# Patient Record
Sex: FEMALE | Race: WHITE
[De-identification: names, ages, dates, MRNs, and addresses within clinical notes are randomized per-mention and may not be internally consistent; named-entity substitution may affect disease eponyms.]

---

## 2017-01-04 ENCOUNTER — HOSPITAL ENCOUNTER (INPATIENT)
Dept: HOSPITAL 74 - JER | Age: 77
LOS: 1 days | Discharge: HOME | DRG: 309 | End: 2017-01-05
Attending: FAMILY MEDICINE | Admitting: FAMILY MEDICINE
Payer: COMMERCIAL

## 2017-01-04 VITALS — BODY MASS INDEX: 26.5 KG/M2

## 2017-01-04 DIAGNOSIS — I10: ICD-10-CM

## 2017-01-04 DIAGNOSIS — J98.11: ICD-10-CM

## 2017-01-04 DIAGNOSIS — Z79.01: ICD-10-CM

## 2017-01-04 DIAGNOSIS — I51.9: ICD-10-CM

## 2017-01-04 DIAGNOSIS — K29.60: ICD-10-CM

## 2017-01-04 DIAGNOSIS — C44.89: ICD-10-CM

## 2017-01-04 DIAGNOSIS — I48.0: Primary | ICD-10-CM

## 2017-01-04 DIAGNOSIS — I35.0: ICD-10-CM

## 2017-01-04 DIAGNOSIS — K86.1: ICD-10-CM

## 2017-01-04 DIAGNOSIS — I25.118: ICD-10-CM

## 2017-01-04 DIAGNOSIS — Z95.0: ICD-10-CM

## 2017-01-04 DIAGNOSIS — E78.5: ICD-10-CM

## 2017-01-04 DIAGNOSIS — K76.0: ICD-10-CM

## 2017-01-04 LAB
ALBUMIN SERPL-MCNC: 3.6 G/DL (ref 3.4–5)
ALP SERPL-CCNC: 69 U/L (ref 45–117)
ALT SERPL-CCNC: 16 U/L (ref 12–78)
ANION GAP SERPL CALC-SCNC: 7 MMOL/L (ref 8–16)
APPEARANCE UR: CLEAR
APTT BLD: 46.6 SECONDS (ref 26.9–34.4)
AST SERPL-CCNC: 18 U/L (ref 15–37)
BASOPHILS # BLD: 0.9 % (ref 0–2)
BILIRUB SERPL-MCNC: 0.2 MG/DL (ref 0.2–1)
BILIRUB UR STRIP.AUTO-MCNC: NEGATIVE MG/DL
CALCIUM SERPL-MCNC: 8.7 MG/DL (ref 8.5–10.1)
CO2 SERPL-SCNC: 28 MMOL/L (ref 21–32)
COLOR UR: COLORLESS
CREAT SERPL-MCNC: 0.9 MG/DL (ref 0.55–1.02)
DEPRECATED RDW RBC AUTO: 15.1 % (ref 11.6–15.6)
EOSINOPHIL # BLD: 3.1 % (ref 0–4.5)
GLUCOSE SERPL-MCNC: 107 MG/DL (ref 74–106)
INR BLD: 2.34 (ref 0.82–1.09)
KETONES UR QL STRIP: NEGATIVE
LEUKOCYTE ESTERASE UR QL STRIP.AUTO: NEGATIVE
MCH RBC QN AUTO: 28.2 PG (ref 25.7–33.7)
MCHC RBC AUTO-ENTMCNC: 31.6 G/DL (ref 32–36)
MCV RBC: 89.2 FL (ref 80–96)
NEUTROPHILS # BLD: 63.3 % (ref 42.8–82.8)
NITRITE UR QL STRIP: NEGATIVE
PH UR: 7 [PH] (ref 5–8)
PLATELET # BLD AUTO: 239 K/MM3 (ref 134–434)
PMV BLD: 8.5 FL (ref 7.5–11.1)
PROT SERPL-MCNC: 7.2 G/DL (ref 6.4–8.2)
PROT UR QL STRIP: NEGATIVE
PROT UR QL STRIP: NEGATIVE
PT PNL PPP: 26.2 SEC (ref 9.98–11.88)
RBC # BLD AUTO: 4 /HPF (ref 0–3)
RBC # UR STRIP: (no result) /UL
SP GR UR: 1 (ref 1–1.03)
TROPONIN I SERPL-MCNC: < 0.02 NG/ML (ref 0–0.05)
TSH SERPL-ACNC: 2.68 UIU/ML (ref 0.36–3.74)
UROBILINOGEN UR STRIP-MCNC: NEGATIVE E.U./DL (ref 0.2–1)
WBC # BLD AUTO: 9.7 K/MM3 (ref 4–10)
WBC # UR AUTO: <1 /HPF (ref 3–5)

## 2017-01-04 RX ADMIN — SOTALOL HYDROCHLORIDE SCH MG: 80 TABLET ORAL at 22:14

## 2017-01-04 NOTE — HP
CHIEF COMPLAINT: " i had palpitations but i feel fine now" 





PCP: Dr. Kang





HISTORY OF PRESENT ILLNESS:





This is a 75 yo F with PMH of HTN, CAD, a-fib on Coum, and pacemaker, who 

presents due to chest discomfort and palpitations. Her palpitations have been 

on and off for the past week, associated with mild lightheadedness. She states 

that she has not had any palpitations for 2 month prior to this onset.   This 

morning she experienced an episode of diffuse chest pressure that radiated to 

her back. She has had similar chest pressure in the past.  It lasted an hour 

and resolved spontaneously. She is scheduled for a pacemaker interrogation with 

[employer] on Monday.  She denies chest pain, sob, cough, n/v, 

diaphoresis, h/a, abd pain or recent illnesses. She currently feels well.





ER course was notable for:


(1)EKG, CXR 


(2)Labs 


(3)asa





Recent Travel: denies 





PAST MEDICAL HISTORY: as above





PAST SURGICAL HISTORY:





Social History: lives at home 


Smoking: denies


Alcohol: denies


Drugs: denies





Family History:


Allergies





Penicillins Adverse Reaction (Verified 01/04/17 16:55)


 Itching








HOME MEDICATIONS:


 











 Medication  Instructions  Recorded


 


Warfarin Sodium [Coumadin] 5 mg PO ASDIR 10/16/14


 


Sotalol HCl [Betapace -] 80 mg PO BID #28 tablet 10/19/14


 


Esomeprazole Mag Trihydrate 40 mg PO DAILY 04/02/15





[Nexium]  


 


Diltiazem Cd [Cardizem Cd -] 300 mg PO DAILY #30 cap.cd.24h 07/24/15


 


Valsartan [Diovan] 160 mg PO DAILY #14 tablet 07/24/15


 


Warfarin Na [Coumadin] 2.5 mg PO ASDIR 02/02/16








 Selected Entries











  01/04/17 01/04/17





  14:37 20:02


 


Temperature 97.7 F 


 


Pulse Rate 60 


 


Pulse Rate [  58 L





Left]  


 


Respiratory  16





Rate  


 


Blood Pressure 138/50 


 


Blood Pressure  157/87





[Arm]  


 


O2 Sat by Pulse  99





Oximetry (%)  


 


Oxygen Delivery  Room Air





Method  














REVIEW OF SYSTEMS


CONSTITUTIONAL: 


Absent:  fever, chills, diaphoresis, generalized weakness


HEENT: 


Absent:  rhinorrhea, difficulty swallowing


CARDIOVASCULAR: 


Absent: chest pain, syncope, peripheral edema


RESPIRATORY: 


Absent: cough, shortness of breath, orthopnea


GASTROINTESTINAL:


Absent: abdominal pain, abdominal distension, nausea, vomiting, diarrhea, 

constipation


GENITOURINARY: 


Absent: dysuria, flank pain


MUSCULOSKELETAL: 


Absent: neck pain


SKIN: 


Absent: rash


HEMATOLOGIC/IMMUNOLOGIC: 


Absent: frequent infections


ENDOCRINE:


Absent: unexplained weight gain, unexplained weight loss


NEUROLOGIC: 


Absent: headache, focal weakness or paresthesias


PSYCHIATRIC: 


Absent: anxiety





 Laboratory Tests











  01/04/17 01/04/17 01/04/17





  16:40 16:40 16:40


 


WBC  9.7  


 


Hgb  12.3  


 


Hct  38.9  


 


Plt Count  239  


 


INR   2.34 H 


 


PTT (Actin FS)   46.6 H 


 


Sodium    141


 


Potassium    4.5


 


Chloride    106


 


Carbon Dioxide    28


 


Anion Gap    7 L


 


BUN    14


 


Creatinine    0.9


 


Creat Clearance w eGFR    > 60


 


Random Glucose    107 H


 


Calcium    8.7


 


Total Bilirubin    0.2


 


AST    18  D


 


ALT    16  D


 


Alkaline Phosphatase    69


 


Creatine Kinase    66


 


Troponin I    < 0.02


 


B-Natriuretic Peptide    341.88


 


Total Protein    7.2


 


Albumin    3.6


 


TSH    2.68


 


Urine Color   


 


Urine Appearance   


 


Urine pH   


 


Ur Specific Gravity   


 


Urine Protein   


 


Urine Glucose (UA)   


 


Urine Ketones   


 


Urine Blood   


 


Urine Nitrite   


 


Urine Bilirubin   


 


Urine Urobilinogen   


 


Ur Leukocyte Esterase   


 


Urine RBC   


 


Urine WBC   


 


Ur Epithelial Cells   














  01/04/17





  17:50


 


WBC 


 


Hgb 


 


Hct 


 


Plt Count 


 


INR 


 


PTT (Actin FS) 


 


Sodium 


 


Potassium 


 


Chloride 


 


Carbon Dioxide 


 


Anion Gap 


 


BUN 


 


Creatinine 


 


Creat Clearance w eGFR 


 


Random Glucose 


 


Calcium 


 


Total Bilirubin 


 


AST 


 


ALT 


 


Alkaline Phosphatase 


 


Creatine Kinase 


 


Troponin I 


 


B-Natriuretic Peptide 


 


Total Protein 


 


Albumin 


 


TSH 


 


Urine Color  Colorless


 


Urine Appearance  Clear


 


Urine pH  7.0


 


Ur Specific Gravity  1.005


 


Urine Protein  Negative


 


Urine Glucose (UA)  Negative


 


Urine Ketones  Negative


 


Urine Blood  2+ H


 


Urine Nitrite  Negative


 


Urine Bilirubin  Negative


 


Urine Urobilinogen  Negative


 


Ur Leukocyte Esterase  Negative


 


Urine RBC  4


 


Urine WBC  <1


 


Ur Epithelial Cells  Rare














PHYSICAL EXAMINATION





GENERAL: Awake, alert, and fully oriented, in no acute distress.


HEAD: Normal with no signs of trauma.


EYES: Pupils equal, round and reactive to light, extraocular movements intact, 

sclera anicteric, conjunctiva clear. 


EARS, NOSE, THROAT: Moist mucous membranes.


NECK: supple without JVD


LUNGS: Breath sounds equal, clear to auscultation bilaterally. 


HEART: Regular rate and rhythm, normal S1 and S2


ABDOMEN: Soft, nontender, not distended, normoactive bowel sounds


MUSCULOSKELETAL: No CVA tenderness.


UPPER EXTREMITIES: 2+ pulses, warm, well-perfused. No peripheral edema.


LOWER EXTREMITIES: 2+ pulses, warm, well-perfused. No calf tenderness. No 

peripheral edema. 


NEUROLOGICAL:  Cranial nerves II-XII grossly intact. Normal speech. 


PSYCHIATRIC: Cooperative. Good eye contact. Appropriate mood and affect.


SKIN: Warm, dry





ASSESSMENT/PLAN:





This is a 75 yo F with PMH of HTN, CAD, a-fib on Coum, and pacemaker, who 

presents due to chest discomfort and palpitations. palpitations x 1 w, chest 

pressure x 1 hr. 





CXRL mild bibasilar atelectasis





Angina pectoralis 


-likely due to demand ischemia in setting of rapid a-fib


-now resolved 


-EKG regular sinus, no ACS 


-trop negative x1, trend trop


-tele monitoring


-continue asa


-f/u lipid profile, a1c, TFT 





Atrial fibrillation


-likely rapid a fib over the past week, now regular sinus


-continue sotalol, cardizem and coum


-pacemaker interrogation tomorrow AM





HTN


-continue diovan





HLD


-continue statin





FEN


no IVF


stable lytes


DVT GI PPX: coum, ppi


Na restricted diet





Dispo: Obs in tele














Problem List





- Problem


(1) Chest pain


Code(s): R07.9 - CHEST PAIN, UNSPECIFIED   Qualifiers: 


     Chest pain type: unspecified     Qualified Code(s): R07.9 - Chest pain, 

unspecified  





(2) Heart palpitations


Code(s): R00.2 - PALPITATIONS





(3) DVT prophylaxis


Code(s): LGG2176 - 





(4) Hypertension


Code(s): I10 - ESSENTIAL (PRIMARY) HYPERTENSION   








Visit type





- Emergency Visit


Emergency Visit: Yes


ED Registration Date: 01/04/17


Care time: The patient presented to the Emergency Department on the above date 

and was hospitalized for further evaluation of their emergent condition.





- New Patient


This patient is new to me today: Yes


Date on this admission: 01/05/17





- Critical Care


Critical Care patient: No

## 2017-01-04 NOTE — PDOC
History of Present Illness





- History of Present Illness


Initial Comments: 





01/04/17 17:54


The patient is a 76-year-old female with a significant past medical history of 

hypertension, coronary disease, atrial fibrillation on Coumadin, and pacemaker, 

who presents to the emergency department for palpitations and chest pressure 

today. She states her palpitations have been intermittent for about a week. The 

patient states that today she experienced an episode of chest pressure that 

radiated to her back but now resolved. She states she felt lightheaded with her 

palpitations. She reports she is due for a check up of her pacemaker with 

[employer] this upcoming Monday. 





She denies any recent illnesses, fevers, chills, cough, vomiting. She denies 

chest pain right now at this moment but feels generally fatigued.





Allergies: Penicillins


PCP - Dr. Kang





<Rahel Garibay - Last Filed: 01/04/17 18:46>





- General


History Source: Patient, Family, Old Records


Exam Limitations: No Limitations





<Sarabjit Nielsen - Last Filed: 01/04/17 18:50>





- General


Chief Complaint: Palpitations


Stated Complaint: HEART PALPITATIONS


Time Seen by Provider: 01/04/17 16:18





Past History





<Rahel Garibay - Last Filed: 01/04/17 18:46>





- Past Medical History


Anemia: No


Asthma: No


Cancer: Yes (SKIN CA- EARLOBE)


Cardiac Disorders: Yes (CORONARY ARTERY DISEASE)


CVA: No


COPD: No


CHF: No


Dementia: No


Diabetes: No


GI Disorders: Yes (GASTRITIS, diverticlitis)


 Disorders: No


HTN: Yes


Hypercholesterolemia: No


Liver Disease:  (FATTY LIVER)


Suicide Attempt (Hx): No


Seizures: No


Thyroid Disease: No





- Surgical History


Abdominal Surgery: No


Appendectomy: No


Cardiac Surgery: Yes (PACEMAKER IMPLANT-Head Held High-7/2011)


Cholecystectomy: No


Lung Surgery: No


Neurologic Surgery: No


Orthopedic Surgery: No





- Family Disease History


Family Disease History: Heart Disease: Father





- Immunization History


Immunization Up to Date: Yes





- Psycho/Social/Smoking Cessation Hx


Anxiety: No


Suicidal Ideation: No


Smoking Status: Yes


Smoking History: Never smoked


Have you smoked in the past 12 months: No


Number of Cigarettes Smoked Daily: 0


If you are a former smoker, when did you quit?: 25 yrs


Hx Alcohol Use: No


Drug/Substance Use Hx: No


Substance Use Type: None


Hx Substance Use Treatment: No





<Elizabeth Nielsenel - Last Filed: 01/04/17 18:50>





- Past Medical History


Allergies/Adverse Reactions: 


 Allergies











Allergy/AdvReac Type Severity Reaction Status Date / Time


 


Penicillins AdvReac  Itching Verified 01/04/17 16:55











Home Medications: 


Ambulatory Orders





Warfarin Sodium [Coumadin] 5 mg PO ASDIR 10/16/14 


Sotalol HCl [Betapace -] 80 mg PO BID #28 tablet 10/19/14 


Esomeprazole Mag Trihydrate [Nexium] 40 mg PO DAILY 04/02/15 


Diltiazem Cd [Cardizem Cd -] 300 mg PO DAILY #30 cap.cd.24h 07/24/15 


Valsartan [Diovan] 160 mg PO DAILY #14 tablet 07/24/15 


Warfarin Na [Coumadin] 2.5 mg PO ASDIR 02/02/16 











**Review of Systems





- Review of Systems


Able to Perform ROS?: Yes


Comments:: 





01/04/17 17:54





GENERAL/CONSTITUTIONAL: No fever or chills. No weakness.


HEAD, EYES, EARS, NOSE AND THROAT: No change in vision. No ear pain or 

discharge. No sore throat.


CARDIOVASCULAR: (+) chest pressure and palpitations. No chest pain or shortness 

of breath.


RESPIRATORY: No cough, wheezing, or hemoptysis.


GASTROINTESTINAL: No nausea, vomiting, diarrhea or constipation.


GENITOURINARY: No dysuria, frequency, or change in urination.


MUSCULOSKELETAL: No joint or muscle swelling or pain. No neck or back pain.


SKIN: No rash


NEUROLOGIC: No headache, vertigo, loss of consciousness, or change in strength/

sensation.


ENDOCRINE: No increased thirst. No abnormal weight change.


HEMATOLOGIC/LYMPHATIC: No anemia, easy bleeding, or history of blood clots.


ALLERGIC/IMMUNOLOGIC: No hives or skin allergy.











<Rahel Garibay - Last Filed: 01/04/17 18:46>





*Physical Exam





- Vital Signs


 Last Vital Signs











Temp Pulse Resp BP Pulse Ox


 


 97.7 F   60   20   138/50   97 


 


 01/04/17 14:37  01/04/17 14:37  01/04/17 14:37  01/04/17 14:37  01/04/17 14:37














- Physical Exam


Comments: 





01/04/17 17:55





GENERAL: Awake, alert, and fully oriented, in no acute distress


HEAD: No signs of trauma


EYES: PERRLA, EOMI, sclera anicteric, conjunctiva clear


ENT: Auricles normal inspection, hearing grossly normal, nares patent, 

oropharynx clear without exudates. Moist mucosa


NECK: Normal ROM, supple, no lymphadenopathy, JVD, or masses


LUNGS: Breath sounds equal, clear to auscultation bilaterally.  No wheezes, and 

no crackles


HEART: (+) Pacemaker on left. Regular rate and rhythm, normal S1 and S2, no 

murmurs, rubs or gallops


ABDOMEN: Soft, nontender, normoactive bowel sounds.  No guarding, no rebound.  

No masses


EXTREMITIES: Normal range of motion, no edema.  No clubbing or cyanosis. No 

cords, erythema, or tenderness


NEUROLOGICAL: Cranial nerves II-XII intact. Normal speech, normal gait. 

Sensation intact in upper and lower extremities. 5/5 motor strength in upper 

and lower extremities. No pronator drift. Finger to nose intact. Rapid 

alternations intact. 


SKIN: Warm, Dry, normal turgor, no rashes or lesions noted.








<Rahel Garibay - Last Filed: 01/04/17 18:46>





- Vital Signs


 Last Vital Signs











Temp Pulse Resp BP Pulse Ox


 


 97.7 F   60   20   138/50   97 


 


 01/04/17 14:37  01/04/17 14:37  01/04/17 14:37  01/04/17 14:37  01/04/17 14:37














<Sarabjit Nielsen - Last Filed: 01/04/17 18:50>





**Heart Score/ECG Review





- History


History: Moderately suspicious





- Electrocardiogram


EKG: Non specific repolarization disturbance





- Age


Age: >/= 65





- Risk Factors


Based on the list above the patient has:: >/=3 risk factors or Hx 

atherosclerotic disease





- Troponin


Troponin: </= normal limit





- Score


Heart Score - Total: 6


  ** #1


ECG reviewed & interpreted by me at: 17:00





01/04/17 17:04


Paced, LBBB (old),  msec, scarbossa negative





<Sarabjit Nielsen - Last Filed: 01/04/17 18:50>





ED Treatment Course





- LABORATORY


CBC & Chemistry Diagram: 


 01/04/17 16:40





 01/04/17 16:40





- ADDITIONAL ORDERS


Additional order review: 


 Laboratory  Results











  01/04/17 01/04/17





  16:40 16:40


 


INR   2.34 H


 


PTT (Actin FS)   46.6 H


 


Sodium  141 


 


Potassium  4.5 


 


Chloride  106 


 


Carbon Dioxide  28 


 


Anion Gap  7 L 


 


BUN  14 


 


Creatinine  0.9 


 


Creat Clearance w eGFR  > 60 


 


Random Glucose  107 H 


 


Calcium  8.7 


 


Total Bilirubin  0.2 


 


AST  18  D 


 


ALT  16  D 


 


Alkaline Phosphatase  69 


 


Creatine Kinase  66 


 


Troponin I  < 0.02 


 


B-Natriuretic Peptide  341.88 


 


Total Protein  7.2 


 


Albumin  3.6 


 


TSH  2.68 








 











  01/04/17





  16:40


 


RBC  4.36


 


MCV  89.2


 


MCHC  31.6 L


 


RDW  15.1


 


MPV  8.5


 


Neutrophils %  63.3


 


Lymphocytes %  22.8


 


Monocytes %  9.9


 


Eosinophils %  3.1


 


Basophils %  0.9














- RADIOLOGY


Radiograph Interpretation: 





01/04/17 18:46


CXR was read by Dr. Pa at 17:15


Impression: interval mild bibasal atelectatic changes without definite 

infiltrates





- Medications


Given in the ED: 


ED Medications














Discontinued Medications














Generic Name Dose Route Start Last Admin





  Trade Name Freq  PRN Reason Stop Dose Admin


 


Aspirin  162 mg 01/04/17 16:44 01/04/17 16:54





  Asa -  PO 01/04/17 16:45  Not Given





  ONCE ONE   














<Rahel Garibay - Last Filed: 01/04/17 18:46>





- LABORATORY


CBC & Chemistry Diagram: 


 01/04/17 16:40





 01/04/17 16:40





- RADIOLOGY


Radiology Studies Ordered: 














 Category Date Time Status


 


 CHEST X-RAY PORTABLE* [RAD] Stat Radiology  01/04/17 16:20 Ordered














<Sarabjit Nielsen - Last Filed: 01/04/17 18:50>





Medical Decision Making





- Medical Decision Making


01/04/17 18:12


[employer] was called for interrogation of the patient's pacemaker ([phone]) at 17:10. 


The representative returned the call at 17:16 and the patient's case was 

discussed. 





01/04/17 18:23


The hospitalist martha was paged at this time for admission of Dr. Kang's 

patient.





<Rahel Garibay - Last Filed: 01/04/17 18:46>





- Medical Decision Making


01/04/17 16:45





A portion of this note was documented by scribe services under my direction. I 

have reviewed the details of the note, within reason, and agree with the 

documentation with the following case summary and management plan written by 

me. 





Patient treated in the ED.





Nursing notes are reviewed and incorporated into the medical decision-making.


Vital signs reviewed.





Peripheral IV access obtained by the nurse, laboratory studies are drawn and 

sent, reviewed and interpreted by myself. 





 Vital Signs











Temp Pulse Resp BP Pulse Ox


 


 97.7 F   60   20   138/50   97 


 


 01/04/17 14:37  01/04/17 14:37  01/04/17 14:37  01/04/17 14:37  01/04/17 14:37








76-year-old female with history of hypertension, coronary disease, atrial 

fibrillation on Coumadin, pacemaker presents to the emergency department for 

palpitations and chest pressure. She is had an intermittent rapid palpitations 

that come and go for the last week. However, she noted today that she had an 

episode of chest pressure that radiated to the back but now resolved. She felt 

lightheaded with her palpitations. She is due for check up of her pacemaker 

with [employer] this upcoming Monday. She denies any recent illnesses, 

fevers, chills, cough, vomiting. Denies chest pain right now at this moment but 

feels generally fatigued.





The differential is broad but reportedly must rule out MI. With the 

palpitations and cardiac disease, must rule out atrial fibrilation with rapid 

ventricular response, ventricular dysrhythmias or other arrhythmias. We'll need 

to rule out other metabolic causes as well as infectious causes. Ultram, the 

patient benefit from an observation in the hospital.





01/04/17 18:48





Chest x-ray reviewed. No infiltrates appreciated.





 CBC, BMP





 01/04/17 16:40 





 01/04/17 16:40 





 CMP











Sodium  141 mmol/L (136-145)   01/04/17  16:40    


 


Potassium  4.5 mmol/L (3.5-5.1)   01/04/17  16:40    


 


Chloride  106 mmol/L ()   01/04/17  16:40    


 


Carbon Dioxide  28 mmol/L (21-32)   01/04/17  16:40    


 


Anion Gap  7  (8-16)  L  01/04/17  16:40    


 


BUN  14 mg/dL (7-18)   01/04/17  16:40    


 


Creatinine  0.9 mg/dL (0.55-1.02)   01/04/17  16:40    


 


Creat Clearance w eGFR  > 60  (>60)   01/04/17  16:40    


 


Random Glucose  107 mg/dL ()  H  01/04/17  16:40    


 


Calcium  8.7 mg/dL (8.5-10.1)   01/04/17  16:40    


 


Total Bilirubin  0.2 mg/dL (0.2-1.0)   01/04/17  16:40    


 


AST  18 U/L (15-37)  D 01/04/17  16:40    


 


ALT  16 U/L (12-78)  D 01/04/17  16:40    


 


Alkaline Phosphatase  69 U/L ()   01/04/17  16:40    


 


Creatine Kinase  66 IU/L ()   01/04/17  16:40    


 


Troponin I  < 0.02 ng/ml (0.00-0.05)   01/04/17  16:40    


 


B-Natriuretic Peptide  341.88 pg/ml (5-450)   01/04/17  16:40    


 


Total Protein  7.2 g/dl (6.4-8.2)   01/04/17  16:40    


 


Albumin  3.6 g/dl (3.4-5.0)   01/04/17  16:40    


 


TSH  2.68 uIU/ml (0.358-3.74)   01/04/17  16:40    








 Urine Test Results











Urine Color  Colorless   01/04/17  17:50    


 


Urine Appearance  Clear   01/04/17  17:50    


 


Urine pH  7.0  (5.0-8.0)   01/04/17  17:50    


 


Ur Specific Gravity  1.005  (1.001-1.035)   01/04/17  17:50    


 


Urine Protein  Negative  (NEGATIVE)   01/04/17  17:50    


 


Urine Glucose (UA)  Negative  (NEGATIVE)   01/04/17  17:50    


 


Urine Ketones  Negative  (NEGATIVE)   01/04/17  17:50    


 


Urine Blood  2+  (NEGATIVE)  H  01/04/17  17:50    


 


Urine Nitrite  Negative  (NEGATIVE)   01/04/17  17:50    


 


Urine Bilirubin  Negative  (NEGATIVE)   01/04/17  17:50    


 


Ur Leukocyte Esterase  Negative  (NEGATIVE)   01/04/17  17:50    


 


Urine RBC  4 /hpf (0-3)   01/04/17  17:50    


 


Urine WBC  <1 /hpf (3-5)   01/04/17  17:50    


 


Ur Epithelial Cells  Rare /hpf (FEW)   01/04/17  17:50    








Labs reviewed. Negative troponin.


Case discussed with [employer]. They will place on her on schedule 

tomorrow for interrogation.


Pt's cardiologist is Dr. Reese's group.


Case discussed with Dr. Sweeney who accepts the patient for telemetry 

admission.


Pt was given aspirin but she refused, given that she is concerned about taking 

coumadin and afraid of excessive bleeding.





<Sarabjit Nielsen - Last Filed: 01/04/17 18:50>





*DC/Admit/Observation/Transfer





- Attestations


Scribe Attestion: 





01/04/17 17:55





Documentation prepared by Rahel Garibay, acting as medical scribe for Sarabjit Nielsen MD, MD





<Rahel Garibay - Last Filed: 01/04/17 18:46>





- Discharge Dispostion


Admit: Yes





<Sarabjit Nielsen - Last Filed: 01/04/17 18:50>


Diagnosis at time of Disposition: 


 Heart palpitations





Chest pain


Qualifiers:


 Chest pain type: unspecified Qualified Code(s): R07.9 - Chest pain, unspecified





- Discharge Dispostion


Condition at time of disposition: Stable





- Referrals


Referrals: 


Radha Kang MD [Primary Care Provider] -

## 2017-01-04 NOTE — PN
<Norma Patiño - Last Filed: 01/04/17 20:13>





Teaching Attending Note


Name of Resident: Tinaemanuel Singleton





<Danya Medina - Last Filed: 01/05/17 02:33>





Teaching Attending Note


ATTENDING PHYSICIAN STATEMENT





I saw and evaluated the patient.


I reviewed the resident's note and discussed the case with the resident.


I agree with the resident's findings and plan as documented.








SUBJECTIVE:


76 year old female who presented to the ED with palpitations for 1 day. History 

significant for hypertension, dyslipidemia, CAD with pacemaker, Afib (on 

Coumadin). Patient follows up with her PMD for her checking her INR and is 

currently on Coumadin 5 mg on M,W,F and 2.5 mg on T, Th, Sat, Sun. Patient 

reports following up with her cardiologist every 3 months and is due for her 

ICD interrogation this Monday. 


She denies nausea, vomiting or regurgitation. She refuses influenza vaccination.


Patient denies any chest pain, SOB or peripheral edema. 


PMD - Dr. Kang


Cardiologist - Dr. Juarez





PMH: as documented above


PSH: pacemaker placement


FMH: father - heart disease


SH: former smoker quit 25 years ago, denies alcohol and illicit drug use. lives 

in assisted living 








OBJECTIVE:


Physical:


VS:


 Last Vital Signs











Temp Pulse Resp BP Pulse Ox


 


 97.7 F   58 L  16   157/87   99 


 


 01/04/17 14:37  01/04/17 20:02  01/04/17 20:02  01/04/17 20:02  01/04/17 20:02











GENERAL: Awake, alert, and fully oriented, in no acute distress


HEENT: Atraumatic. PERRLA, EOMI. Moist mucosa. No JVD, no bruis


LUNGS: Left mild basal crackles. No distress, speaks full sentences.


HEART: Regular rate and rhythm, normal S1 and S2, no murmurs, rubs or gallops, 

peripheral pulses normal and equal bilaterally.


ABDOMEN: Soft, nontender, normoactive bowel sounds. No guarding, no rebound. No 

masses


EXTREMITIES:  trace peripheral edema. Normal inspection, Normal range of motion 

No clubbing or cyanosis.


NEUROLOGICAL: Cranial nerves II through XII grossly intact. Normal speech, No 

focal sensorimotor deficits


SKIN: Warm, Dry, normal turgor, no rashes or lesions noted.





LABS:


 CBCD











WBC  9.7 K/mm3 (4.0-10.0)   01/04/17  16:40    


 


RBC  4.36 M/mm3 (3.60-5.2)   01/04/17  16:40    


 


Hgb  12.3 GM/dL (10.7-15.3)   01/04/17  16:40    


 


Hct  38.9 % (32.4-45.2)   01/04/17  16:40    


 


MCV  89.2 fl (80-96)   01/04/17  16:40    


 


MCHC  31.6 g/dl (32.0-36.0)  L  01/04/17  16:40    


 


RDW  15.1 % (11.6-15.6)   01/04/17  16:40    


 


Plt Count  239 K/MM3 (134-434)   01/04/17  16:40    


 


MPV  8.5 fl (7.5-11.1)   01/04/17  16:40    








 CMP











Sodium  141 mmol/L (136-145)   01/04/17  16:40    


 


Potassium  4.5 mmol/L (3.5-5.1)   01/04/17  16:40    


 


Chloride  106 mmol/L ()   01/04/17  16:40    


 


Carbon Dioxide  28 mmol/L (21-32)   01/04/17  16:40    


 


Anion Gap  7  (8-16)  L  01/04/17  16:40    


 


BUN  14 mg/dL (7-18)   01/04/17  16:40    


 


Creatinine  0.9 mg/dL (0.55-1.02)   01/04/17  16:40    


 


Creat Clearance w eGFR  > 60  (>60)   01/04/17  16:40    


 


Calcium  8.7 mg/dL (8.5-10.1)   01/04/17  16:40    


 


Total Bilirubin  0.2 mg/dL (0.2-1.0)   01/04/17  16:40    


 


AST  18 U/L (15-37)  D 01/04/17  16:40    


 


ALT  16 U/L (12-78)  D 01/04/17  16:40    


 


Alkaline Phosphatase  69 U/L ()   01/04/17  16:40    


 


Total Protein  7.2 g/dl (6.4-8.2)   01/04/17  16:40    


 


Albumin  3.6 g/dl (3.4-5.0)   01/04/17  16:40    











IMAGING:


CXR: Impression: Interval mild bibasilar atelectatic changes without definite 

infiltrates 





ASSESSMENT AND PLAN:


Palpitations most likely secondary to Afib, rate controlled, thyroid disorder 

ruled out. Rule out ACS. Heart score of 6. 


- ICD interrogated


- Cardiology consult 


- ZHAO score of 3 


- Trend troponins 


- Continue as cardiac monitoring 





Afib with subtherapeutic INR 2.3 


- Coumadin 5 mg tonight 


- Repeat coagulation profile in AM


- Continue home meds





Place on Observation-Tele.





Documentation prepared by Danya Medina, acting as medical scribe for Norma Patiño M.D.

## 2017-01-05 VITALS — HEART RATE: 64 BPM | SYSTOLIC BLOOD PRESSURE: 127 MMHG | DIASTOLIC BLOOD PRESSURE: 58 MMHG | TEMPERATURE: 98 F

## 2017-01-05 LAB
ALBUMIN SERPL-MCNC: 3.2 G/DL (ref 3.4–5)
ALP SERPL-CCNC: 65 U/L (ref 45–117)
ALT SERPL-CCNC: 14 U/L (ref 12–78)
ANION GAP SERPL CALC-SCNC: 8 MMOL/L (ref 8–16)
AST SERPL-CCNC: 16 U/L (ref 15–37)
BASOPHILS # BLD: 0.8 % (ref 0–2)
BILIRUB SERPL-MCNC: 0.3 MG/DL (ref 0.2–1)
CALCIUM SERPL-MCNC: 8.6 MG/DL (ref 8.5–10.1)
CHOLEST SERPL-MCNC: 199 MG/DL (ref 50–200)
CO2 SERPL-SCNC: 27 MMOL/L (ref 21–32)
CREAT SERPL-MCNC: 0.7 MG/DL (ref 0.55–1.02)
DEPRECATED RDW RBC AUTO: 14.7 % (ref 11.6–15.6)
EOSINOPHIL # BLD: 3.5 % (ref 0–4.5)
GLUCOSE SERPL-MCNC: 93 MG/DL (ref 74–106)
LDLC SERPL CALC-MCNC: 118 MG/DL (ref 5–100)
MAGNESIUM SERPL-MCNC: 2.1 MG/DL (ref 1.8–2.4)
MCH RBC QN AUTO: 29.2 PG (ref 25.7–33.7)
MCHC RBC AUTO-ENTMCNC: 32.7 G/DL (ref 32–36)
MCV RBC: 89.2 FL (ref 80–96)
NEUTROPHILS # BLD: 62.6 % (ref 42.8–82.8)
PLATELET # BLD AUTO: 207 K/MM3 (ref 134–434)
PMV BLD: 8.8 FL (ref 7.5–11.1)
PROT SERPL-MCNC: 6.6 G/DL (ref 6.4–8.2)
TROPONIN I SERPL-MCNC: < 0.02 NG/ML (ref 0–0.05)
TSH SERPL-ACNC: 3.06 UIU/ML (ref 0.36–3.74)
WBC # BLD AUTO: 7.7 K/MM3 (ref 4–10)

## 2017-01-05 RX ADMIN — SOTALOL HYDROCHLORIDE SCH MG: 80 TABLET ORAL at 10:14

## 2017-01-05 NOTE — EKG
Test Reason : 

Blood Pressure : ***/*** mmHG

Vent. Rate : 060 BPM     Atrial Rate : 060 BPM

   P-R Int : 292 ms          QRS Dur : 140 ms

    QT Int : 524 ms       P-R-T Axes : 072 -21 118 degrees

   QTc Int : 524 ms

 

Atrial-paced rhythm

LEFT BUNDLE BRANCH BLOCK

ABNORMAL ECG

Confirmed by JACKY OLIVEROS MD ([id number]) on 1/5/2017 4:34:14 PM

 

Referred By:             Confirmed By:JACKY OLIVEROS MD

## 2017-01-05 NOTE — EKG
Test Reason : 

Blood Pressure : ***/*** mmHG

Vent. Rate : 063 BPM     Atrial Rate : 063 BPM

   P-R Int : 252 ms          QRS Dur : 140 ms

    QT Int : 494 ms       P-R-T Axes : 076 -10 122 degrees

   QTc Int : 505 ms

 

SINUS RHYTHM WITH 1ST DEGREE A-V BLOCK

LEFT BUNDLE BRANCH BLOCK

ABNORMAL ECG

WHEN COMPARED WITH ECG OF 04-JAN-2017 16:56,

SINUS RHYTHM HAS REPLACED ELECTRONIC ATRIAL PACEMAKER

Confirmed by ARLIN MCDANIEL, JACKY ([id number]) on 1/5/2017 4:35:36 PM

 

Referred By: KUSUM RICKS           Confirmed By:AJCKY OLIVEROS MD

## 2017-01-05 NOTE — PN
Progress Note, Physician


History of Present Illness: 


patient of dr Kang admitted to my service seen by hospitalist last night


I will take over the care of the patient





76-year-old female with a significant past medical history of hypertension, 

coronary disease, atrial fibrillation on Coumadin, and pacemaker, who presents 

to the emergency department for palpitations and chest pressure yesterday. She 

states her palpitations have been intermittent for about a week. The patient 

states that  she experienced an episode of chest pressure that radiated to her 

back but now resolved. She states she felt lightheaded with her palpitations. 

She reports she is due for a check up of her pacemaker with [employer] 

this upcoming Monday. 


This am pt offers no complaints of chest pain or palpitaions


sitting in bed comfortable








- Current Medication List


Current Medications: 


Active Medications





Acetaminophen (Tylenol -)  650 mg PO Q4H PRN


   PRN Reason: FEVER OR PAIN


Aspirin (Ecotrin -)  162 mg PO DAILY [facility]


Atorvastatin Calcium (Lipitor -)  40 mg PO HS [facility]


   Last Admin: 01/04/17 22:14 Dose:  40 mg


Diltiazem HCl 180 mg/ (Diltiazem HCl 120 mg)  300 mg PO DAILY [facility]


Pantoprazole Sodium (Protonix -)  40 mg PO DAILY [facility]


Sotalol HCl (Betapace -)  80 mg PO BID [facility]


   Last Admin: 01/04/17 22:14 Dose:  80 mg


Valsartan (Diovan -)  160 mg PO DAILY [facility]


Warfarin Sodium (Coumadin -)  5 mg PO ASDIR [facility]


   Last Admin: 01/04/17 20:53 Dose:  5 mg











- Objective


Vital Signs: 


 Vital Signs











Temperature  97.9 F   01/05/17 06:00


 


Pulse Rate  63   01/05/17 06:00


 


Respiratory Rate  18   01/05/17 06:00


 


Blood Pressure  123/56   01/05/17 06:00


 


O2 Sat by Pulse Oximetry (%)  98   01/05/17 00:47











Cardiovascular: Yes: Murmur, S1, S2


Respiratory: Yes: Regular, CTA Bilaterally


Gastrointestinal: Yes: Normal Bowel Sounds, Soft.  No: Tenderness


Edema: No


Neurological: Yes: Alert, Oriented


Labs: 


 INR, PTT











INR  2.34  (0.82-1.09)  H  01/04/17  16:40    














Problem List





- Problems


(1) Chest pain


Assessment/Plan: 





no further chest pain--pt states had discomfort wth palpitations but feels fine 

now


 Troponin, BNP











  01/04/17 01/05/17 01/05/17





  16:40 00:30 06:00


 


Troponin I  < 0.02  < 0.02  < 0.02


 


B-Natriuretic Peptide  341.88  








cardio consult





Code(s): R07.9 - CHEST PAIN, UNSPECIFIED   Qualifiers: 


     Chest pain type: unspecified     Qualified Code(s): R07.9 - Chest pain, 

unspecified  





(2) Heart palpitations


Assessment/Plan: 


resolved


may have been afib


monitor on tele


Code(s): R00.2 - PALPITATIONS





(3) Hypertension


Assessment/Plan: 


controlled


Code(s): I10 - ESSENTIAL (PRIMARY) HYPERTENSION   





(4) Afib


Assessment/Plan: 


continue with coumadin


 INR, PTT











INR  2.34  (0.82-1.09)  H  01/04/17  16:40    











Code(s): I48.91 - UNSPECIFIED ATRIAL FIBRILLATION   





(5) Pacemaker


Assessment/Plan: 


Interrogation





Code(s): Z95.0 - PRESENCE OF CARDIAC PACEMAKER

## 2017-01-05 NOTE — CONSULT
Consult


Consult Specialty:: Cardiology


Referred by:: Carlos Luna MD


Reason for Consultation:: Palpitations





- History of Present Illness


Chief Complaint: Palpitations


History of Present Illness: 


This is a 76 year old female with a history of moderately obstructive CAD with 

endothelial dysfunction on angiography April 2011 for medical therapy, HTN, 

diastolic dysfunction, paroxysmal afib on warfarin [id number], sick sinus 

syndrome s/p pacemaker, hyperlipidemia, mild carotid stenosis, interstitial 

lung disease, degenerative joint disease  referred to the ED for intermittent 

palpitations with associated sxs of chest pain radiating to back and 

lightheadedness over past week since resolved. She denies dyspnea, true syncope

, orthopnea, PND or LE edema. EKG shows atrial pacing, pacer interrogation 

shows normal function, episodes of mode switches c/w PAF, currently asymptomatic

, reports medication compliance.











- History Source


History Provided By: Patient


Limitations to Obtaining History: No Limitations





- Past Medical History


Cardio/Vascular: Yes: AFIB, CAD, HTN, Hyperlipdemia


...Pregnant: No





- Past Surgical History


Past Surgical History: Yes: Permanent Pacemaker





- Alcohol/Substance Use


Hx Alcohol Use: No


History of Substance Use: reports: None





- Smoking History


Smoking history: Former smoker


Have you smoked in the past 12 months: No


Aproximately how many cigarettes per day: 0


If you are a former smoker, when did you quit?: 25 yrs





Home Medications





- Allergies


Allergies/Adverse Reactions: 


 Allergies











Allergy/AdvReac Type Severity Reaction Status Date / Time


 


Penicillins AdvReac  Itching Verified 01/04/17 16:55














- Home Medications


Home Medications: 


Ambulatory Orders





Warfarin Sodium [Coumadin] 5 mg PO ASDIR 10/16/14 


Sotalol HCl [Betapace -] 80 mg PO BID #28 tablet 10/19/14 


Esomeprazole Mag Trihydrate [Nexium] 40 mg PO DAILY 04/02/15 


Diltiazem Cd [Cardizem Cd -] 300 mg PO DAILY #30 cap.cd.24h 07/24/15 


Valsartan [Diovan] 160 mg PO DAILY #14 tablet 07/24/15 


Warfarin Na [Coumadin] 2.5 mg PO ASDIR 02/02/16 











Review of Systems





- Review of Systems


Cardiovascular: reports: Chest Pain, Palpitations


Vital Signs: 


 Vital Signs











Temperature  98.0 F   01/05/17 10:00


 


Pulse Rate  64   01/05/17 10:00


 


Respiratory Rate  18   01/05/17 10:00


 


Blood Pressure  127/58   01/05/17 10:00


 


O2 Sat by Pulse Oximetry (%)  98   01/05/17 09:00











Constitutional: Yes: No Distress, Calm


Neck: Yes: Supple


Respiratory: Yes: Regular, CTA Bilaterally


Gastrointestinal: Yes: Normal Bowel Sounds, Soft


Cardiovascular: Yes: Regular Rate and Rhythm


JVD: No


Carotid Bruit: No


Heart Sounds: Yes: S1, S2


Murmur: Yes: Systolic Murmur, Grade 2


Edema: No





- Other Data


Labs, Other Data: 


 CBC, BMP





 01/05/17 06:00 





 01/05/17 06:00 





 INR, PTT











INR  2.34  (0.82-1.09)  H  01/04/17  16:40    








 Troponin, BNP











  01/05/17 01/05/17





  00:30 06:00


 


Troponin I  < 0.02  < 0.02








 Troponin, BNP











  01/05/17 01/05/17





  00:30 06:00


 


Troponin I  < 0.02  < 0.02











A-paced @ 60 LBBB


Ejection Fraction %: LVEF > or = 40 %





Imaging





- Results


Chest X-ray: Report Reviewed (Mild bibasilar ATX)





Problem List





- Problems


(1) Afib


Code(s): I48.91 - UNSPECIFIED ATRIAL FIBRILLATION   Qualifiers: 


     Atrial fibrillation type: paroxysmal        Qualified Code(s): I48.0 - 

Paroxysmal atrial fibrillation  





(2) Chest pain


Code(s): R07.9 - CHEST PAIN, UNSPECIFIED   Qualifiers: 


     Chest pain type: unspecified     Qualified Code(s): R07.9 - Chest pain, 

unspecified  





(3) Heart palpitations


Code(s): R00.2 - PALPITATIONS





(4) Pacemaker


Code(s): Z95.0 - PRESENCE OF CARDIAC PACEMAKER





(5) Hypertension


Code(s): I10 - ESSENTIAL (PRIMARY) HYPERTENSION   Qualifiers: 


     Hypertension type: essential hypertension        Qualified Code(s): I10 - 

Essential (primary) hypertension  





(6) Coronary artery disease


Code(s): I25.10 - ATHSCL HEART DISEASE OF NATIVE CORONARY ARTERY W/O ANG PCTRS 

  Qualifiers: 


     Coronary Disease-Associated Artery/Lesion type: native artery     Native 

vs. transplanted heart: native heart     Associated angina: with stable angina 

       Qualified Code(s): I25.119 - Atherosclerotic heart disease of native 

coronary artery with unspecified angina pectoris  





(7) Diastolic dysfunction without heart failure


Code(s): I51.9 - HEART DISEASE, UNSPECIFIED





(8) Sick sinus syndrome


Code(s): I49.5 - SICK SINUS SYNDROME





(9) Mild aortic valve stenosis


Code(s): I35.0 - NONRHEUMATIC AORTIC (VALVE) STENOSIS





(10) Hyperlipidemia


Code(s): E78.5 - HYPERLIPIDEMIA, UNSPECIFIED   Qualifiers: 


     Hyperlipidemia type: pure hypercholesterolemia        Qualified Code(s): 

E78.0 - Pure hypercholesterolemia  








Assessment/Plan


9/25/2015 Echo: cLVH with preserved LV fxn, DAVID 1.6 cm^2, mild LAE 4.7 cm, 

eccentric mod MR, mod AR, mild TR RVSP 41 mmHg


6/11/2014 P-Myoview No ischemia LVEF 84%





1. Palpitations referable to paroxysmal atrial fibrillation and PAC currently 

in sinus rhythm on antiarrhythmic therapy with therapeutic INR


2. Chest pain syndrome with underlying CAD moderately obstructive CAD angina 

pectoris for medical therapy


3. Sick sinus syndrome Post PPM


4. HTN


5. Hypercholesterolemia


6. Chronic pancreatitis on Creon





PLAN:





1. Ruled out for MI


2. Increase Cardizem  qd, continue Sotolol 80 mg bid, Pravachol 40 qd and 

Diovan 160 qd


3. Dose Coumadin 2.5 qd per INR


4. May d/c with f/u in office with Dr. Juarez, has echo appt Jan 12, 2017, 

will schedule P-Myoview as outpatient 


5. Thank you for consultative opportunity

## 2017-01-05 NOTE — DS
Physical Examination


Vital Signs: 


 Vital Signs











Temperature  98.0 F   01/05/17 10:00


 


Pulse Rate  64   01/05/17 10:00


 


Respiratory Rate  18   01/05/17 10:00


 


Blood Pressure  127/58   01/05/17 10:00


 


O2 Sat by Pulse Oximetry (%)  98   01/05/17 09:00











Labs: 


 CBC, BMP





 01/05/17 06:00 





 01/05/17 06:00 











Discharge Summary


Reason For Visit: CHEST PAIN /HEART PALPITATIONS


Current Active Problems





Afib (Acute) 


Chest pain (Acute) 


Coronary artery disease (Acute) 


Diastolic dysfunction without heart failure (Acute) 


Heart palpitations (Acute) 


Hyperlipidemia (Acute) 


Mild aortic valve stenosis (Acute) 


Pacemaker (Acute) 


Sick sinus syndrome (Acute) 








Hospital Course: 


76-year-old female with a significant past medical history of hypertension, 

coronary disease, atrial fibrillation on Coumadin, and pacemaker, who presents 

to the emergency department for palpitations and chest pressure yesterday. She 

states her palpitations have been intermittent for about a week. The patient 

states that  she experienced an episode of chest pressure that radiated to her 

back but now resolved. She states she felt lightheaded with her palpitations. 

She reports she is due for a check up of her pacemaker with [employer] 

this upcoming Monday. 


This am pt offers no complaints of chest pain or palpitaions


sitting in bed comfortable








 Problems


(1) Chest pain


Assessment/Plan: 





no further chest pain--pt states had discomfort wth palpitations but feels fine 

now


 Troponin, BNP











  01/04/17 01/05/17 01/05/17





  16:40 00:30 06:00


 


Troponin I  < 0.02  < 0.02  < 0.02


 


B-Natriuretic Peptide  341.88  








cardio consult





Code(s): R07.9 - CHEST PAIN, UNSPECIFIED   Qualifiers: 


     Chest pain type: unspecified     Qualified Code(s): R07.9 - Chest pain, 

unspecified  





(2) Heart palpitations


Assessment/Plan: 


resolved


may have been afib


monitor on tele


Code(s): R00.2 - PALPITATIONS





(3) Hypertension


Assessment/Plan: 


controlled


Code(s): I10 - ESSENTIAL (PRIMARY) HYPERTENSION   





(4) Afib


Assessment/Plan: 


continue with coumadin


 INR, PTT











INR  2.34  (0.82-1.09)  H  01/04/17  16:40    











Code(s): I48.91 - UNSPECIFIED ATRIAL FIBRILLATION   





(5) Pacemaker


Assessment/Plan: 


Interrogation





Code(s): Z95.0 - PRESENCE OF CARDIAC PACEMAKER





Cardiology


Assessment/Plan


9/25/2015 Echo: cLVH with preserved LV fxn, DAVID 1.6 cm^2, mild LAE 4.7 cm, 

eccentric mod MR, mod AR, mild TR RVSP 41 mmHg


6/11/2014 P-Myoview No ischemia LVEF 84%





1. Palpitations referable to paroxysmal atrial fibrillation and PAC currently 

in sinus rhythm on antiarrhythmic therapy with therapeutic INR


2. Chest pain syndrome with underlying CAD moderately obstructive CAD angina 

pectoris for medical therapy


3. Sick sinus syndrome Post PPM


4. HTN


5. Hypercholesterolemia


6. Chronic pancreatitis on Creon





PLAN:





1. Ruled out for MI


2. Increase Cardizem  qd, continue Sotolol 80 mg bid, Pravachol 40 qd and 

Diovan 160 qd


3. Dose Coumadin 2.5 qd per INR


4. May d/c with f/u in office with Dr. Juarez, has echo appt Jan 12, 2017, 

will schedule P-Myoview as outpatient 


5. Thank you for consultative opportunity





Condition: Improved





- Instructions


Diet, Activity, Other Instructions: 


take Coumadin as you were taking home


blood test for Coumadin with dr Kang


Referrals: 


Radha Kang MD [Primary Care Provider] - 1 Week


Disposition: HOME





- Home Medications


Comprehensive Discharge Medication List: 


Ambulatory Orders





Warfarin Sodium [Coumadin] 5 mg PO ASDIR 10/16/14 


Sotalol HCl [Betapace -] 80 mg PO BID #28 tablet 10/19/14 


Esomeprazole Mag Trihydrate [Nexium] 40 mg PO DAILY 04/02/15 


Valsartan [Diovan] 160 mg PO DAILY #14 tablet 07/24/15 


Warfarin Na [Coumadin -] 2.5 mg PO ASDIR 02/02/16 


Diltiazem Cd [Cardizem Cd -] 360 mg PO DAILY #30 cap.cd.24h 01/05/17

## 2017-03-09 ENCOUNTER — HOSPITAL ENCOUNTER (EMERGENCY)
Dept: HOSPITAL 74 - JER | Age: 77
Discharge: HOME | End: 2017-03-09
Payer: COMMERCIAL

## 2017-03-09 VITALS — TEMPERATURE: 97.7 F

## 2017-03-09 VITALS — DIASTOLIC BLOOD PRESSURE: 50 MMHG | HEART RATE: 60 BPM | SYSTOLIC BLOOD PRESSURE: 138 MMHG

## 2017-03-09 VITALS — BODY MASS INDEX: 26.6 KG/M2

## 2017-03-09 DIAGNOSIS — K21.9: Primary | ICD-10-CM

## 2017-03-09 DIAGNOSIS — Z95.0: ICD-10-CM

## 2017-03-09 DIAGNOSIS — Z79.01: ICD-10-CM

## 2017-03-09 DIAGNOSIS — I10: ICD-10-CM

## 2017-03-09 DIAGNOSIS — K86.1: ICD-10-CM

## 2017-03-09 DIAGNOSIS — I25.10: ICD-10-CM

## 2017-03-09 DIAGNOSIS — I48.0: ICD-10-CM

## 2017-03-09 LAB
ALBUMIN SERPL-MCNC: 3.7 G/DL (ref 3.4–5)
ALP SERPL-CCNC: 77 U/L (ref 45–117)
ALT SERPL-CCNC: 16 U/L (ref 12–78)
ANION GAP SERPL CALC-SCNC: 9 MMOL/L (ref 8–16)
APPEARANCE UR: CLEAR
AST SERPL-CCNC: 16 U/L (ref 15–37)
BASOPHILS # BLD: 1 % (ref 0–2)
BILIRUB SERPL-MCNC: 0.3 MG/DL (ref 0.2–1)
BILIRUB UR STRIP.AUTO-MCNC: NEGATIVE MG/DL
CALCIUM SERPL-MCNC: 9.4 MG/DL (ref 8.5–10.1)
CO2 SERPL-SCNC: 28 MMOL/L (ref 21–32)
COLOR UR: YELLOW
CREAT SERPL-MCNC: 0.7 MG/DL (ref 0.55–1.02)
DEPRECATED RDW RBC AUTO: 15 % (ref 11.6–15.6)
EOSINOPHIL # BLD: 3.5 % (ref 0–4.5)
GLUCOSE SERPL-MCNC: 89 MG/DL (ref 74–106)
INR BLD: 2.12 (ref 0.82–1.09)
KETONES UR QL STRIP: NEGATIVE
LEUKOCYTE ESTERASE UR QL STRIP.AUTO: NEGATIVE
MCH RBC QN AUTO: 28.5 PG (ref 25.7–33.7)
MCHC RBC AUTO-ENTMCNC: 32 G/DL (ref 32–36)
MCV RBC: 89.2 FL (ref 80–96)
MUCOUS THREADS URNS QL MICRO: (no result)
NEUTROPHILS # BLD: 64.2 % (ref 42.8–82.8)
NITRITE UR QL STRIP: NEGATIVE
PH UR: 6 [PH] (ref 5–8)
PLATELET # BLD AUTO: 237 K/MM3 (ref 134–434)
PMV BLD: 8.3 FL (ref 7.5–11.1)
PROT SERPL-MCNC: 7.8 G/DL (ref 6.4–8.2)
PROT UR QL STRIP: NEGATIVE
PROT UR QL STRIP: NEGATIVE
PT PNL PPP: 23.7 SEC (ref 9.98–11.88)
RBC # BLD AUTO: 22 /HPF (ref 0–3)
RBC # UR STRIP: (no result) /UL
SP GR UR: 1.01 (ref 1–1.03)
TROPONIN I SERPL-MCNC: < 0.02 NG/ML (ref 0–0.05)
UROBILINOGEN UR STRIP-MCNC: NEGATIVE E.U./DL (ref 0.2–1)
WBC # BLD AUTO: 9.5 K/MM3 (ref 4–10)
WBC # UR AUTO: <1 /HPF (ref 3–5)

## 2017-03-09 PROCEDURE — 3E033GC INTRODUCTION OF OTHER THERAPEUTIC SUBSTANCE INTO PERIPHERAL VEIN, PERCUTANEOUS APPROACH: ICD-10-PCS | Performed by: STUDENT IN AN ORGANIZED HEALTH CARE EDUCATION/TRAINING PROGRAM

## 2017-03-09 NOTE — EKG
Test Reason : 

Blood Pressure : ***/*** mmHG

Vent. Rate : 060 BPM     Atrial Rate : 060 BPM

   P-R Int : 292 ms          QRS Dur : 140 ms

    QT Int : 516 ms       P-R-T Axes : 051 -17 140 degrees

   QTc Int : 516 ms

 

Atrial-paced rhythm

LEFT BUNDLE BRANCH BLOCK

ABNORMAL ECG

WHEN COMPARED WITH ECG OF 05-JAN-2017 09:23,

ELECTRONIC ATRIAL PACEMAKER HAS REPLACED SINUS RHYTHM

Confirmed by ARLIN MCDANIEL, JACKY ([id number]) on 3/9/2017 3:55:37 PM

 

Referred By:             Confirmed By:JACKY OLIVEROS MD

## 2017-03-09 NOTE — PDOC
History of Present Illness





- General


History Source: Patient





- History of Present Illness


Timing/Duration: reports: constant


Abdominal Pain Onset Location: reports: epigastric





<Jenny Castellanos - Last Filed: 03/09/17 14:18>





<Edmundo Wynne - Last Filed: 03/11/17 08:41>





- General


Chief Complaint: Pain, Acute


Stated Complaint: ABD PAIN


Time Seen by Provider: 03/09/17 10:14





Past History





- Past Medical History


Anemia: No


Asthma: No


Cancer: Yes (SKIN CA- EARLOBE)


Cardiac Disorders: Yes (CORONARY ARTERY DISEASE)


CVA: No


COPD: No


CHF: No


Dementia: No


Diabetes: No


GI Disorders: Yes (GASTRITIS, diverticlitis)


 Disorders: No


HTN: Yes


Hypercholesterolemia: Yes


Liver Disease:  (FATTY LIVER)


Suicide Attempt (Hx): No


Seizures: No


Thyroid Disease: No





- Surgical History


Abdominal Surgery: No


Appendectomy: No


Cardiac Surgery: Yes (PACEMAKER IMPLANT-[employer]-7/2011)


Cholecystectomy: No


Lung Surgery: No


Neurologic Surgery: No


Orthopedic Surgery: No





- Family Disease History


Family Disease History: Heart Disease: Father





- Immunization History


Immunization Up to Date: Yes





- Psycho/Social/Smoking Cessation Hx


Anxiety: No


Suicidal Ideation: No


Smoking Status: Yes


Smoking History: Never smoked


Have you smoked in the past 12 months: No


Number of Cigarettes Smoked Daily: 0


If you are a former smoker, when did you quit?: 25 yrs


Hx Alcohol Use: No


Drug/Substance Use Hx: No


Substance Use Type: None


Hx Substance Use Treatment: No





<Jenny Castellaons - Last Filed: 03/09/17 14:18>





<Edmundo Wynne - Last Filed: 03/11/17 08:41>





- Past Medical History


Allergies/Adverse Reactions: 


 Allergies











Allergy/AdvReac Type Severity Reaction Status Date / Time


 


Penicillins AdvReac  Itching Verified 03/09/17 09:37











Home Medications: 


Ambulatory Orders





Warfarin Sodium [Coumadin] 5 mg PO ASDIR 10/16/14 


Sotalol HCl [Betapace -] 80 mg PO BID #28 tablet 10/19/14 


Esomeprazole Mag Trihydrate [Nexium] 40 mg PO DAILY 04/02/15 


Valsartan [Diovan] 160 mg PO DAILY #14 tablet 07/24/15 


Warfarin Na [Coumadin -] 2.5 mg PO ASDIR 02/02/16 


Diltiazem Cd [Cardizem Cd -] 360 mg PO DAILY #30 cap.cd.24h 01/05/17 


Famotidine [Pepcid] 20 mg PO DAILY #14 tablet 03/09/17 


Mag Hydrox/Al Hydrox/Simeth [Mylanta Suspension -] 30 ml PO Q6H #1 bottle 03/09/ 17 











**Review of Systems





- Review of Systems


Constitutional: No: Chills, Fever


Respiratory: No: Cough, Shortness of Breath


Cardiac (ROS): No: Chest Pain


ABD/GI: No: Constipated, Diarrhea, Nausea, Vomiting


: No: Dysuria


Musculoskeletal: Yes: Back Pain





<Jenny Castellanos - Last Filed: 03/09/17 14:18>





*Physical Exam





- Vital Signs


 Last Vital Signs











Temp Pulse Resp BP Pulse Ox


 


 97.7 F   67   17   118/73   97 


 


 03/09/17 09:37  03/09/17 09:37  03/09/17 09:37  03/09/17 09:37  03/09/17 09:37














- Physical Exam


General Appearance: Yes: Appropriately Dressed.  No: Apparent Distress


HEENT: positive: Normal Voice


Neck: positive: Supple


Respiratory/Chest: positive: Lungs Clear, Normal Breath Sounds.  negative: 

Respiratory Distress


Cardiovascular: positive: Regular Rate, S1, S2


Gastrointestinal/Abdominal: positive: Soft.  negative: Tender, Distended, 

Guarding, Rebound


Musculoskeletal: negative: CVA Tenderness


Extremity: positive: Normal Inspection


Integumentary: positive: Dry, Warm


Neurologic: positive: Fully Oriented, Alert, Normal Mood/Affect





<Jenny Castellanos - Last Filed: 03/09/17 14:18>





- Vital Signs


 Last Vital Signs











Temp Pulse Resp BP Pulse Ox


 


 97.7 F   60   18   138/50   95 


 


 03/09/17 14:29  03/09/17 14:29  03/09/17 14:29  03/09/17 14:29  03/09/17 14:29














<Edmundo Wynne - Last Filed: 03/11/17 08:41>





**Heart Score/ECG Review





- ECG Intrepretation


Comment:: 





03/09/17 14:19


Paced rhythm





<Jenny Castellanos - Last Filed: 03/09/17 14:18>





ED Treatment Course





- LABORATORY


CBC & Chemistry Diagram: 


 03/09/17 11:20





 03/09/17 11:20





- RADIOLOGY


Radiology Studies Ordered: 














 Category Date Time Status


 


 CHEST X-RAY PORTABLE* [RAD] Stat Radiology  03/09/17 10:14 Completed














<Jenny Castellanos - Last Filed: 03/09/17 14:18>





- LABORATORY


CBC & Chemistry Diagram: 


 03/09/17 11:20





 03/09/17 11:20





- ADDITIONAL ORDERS


Additional order review: 


 











  03/09/17





  11:20


 


RBC  4.52


 


MCV  89.2


 


MCHC  32.0


 


RDW  15.0


 


MPV  8.3


 


Neutrophils %  64.2


 


Lymphocytes %  21.4


 


Monocytes %  9.9


 


Eosinophils %  3.5


 


Basophils %  1.0














- Medications


Given in the ED: 


ED Medications














Discontinued Medications














Generic Name Dose Route Start Last Admin





  Trade Name Poliq  PRN Reason Stop Dose Admin


 


Al Hydroxide/Mg Hydroxide  30 ml 03/09/17 10:34 03/09/17 11:49





  Mylanta Suspension -  PO 03/09/17 10:35  30 ml





  ONCE ONE   Administration


 


Famotidine/Sodium Chloride  50 mls @ 100 mls/hr 03/09/17 10:34 03/09/17 11:49





  Pepcid 20 Mg Premixed Ivpb -  IVPB 03/09/17 11:03  100 mls/hr





  ONCE ONE   Administration














<SherrellEdmundo - Last Filed: 03/11/17 08:41>





Medical Decision Making





- Medical Decision Making


03/09/17 10:36


77-year-old female, well-year-old female, history of CAD, paroxysmal A.fib on 

Coumadin, hypertension, hyperlipidemia, chronic pancreatitis on creon, GERD on 

Nexium, here with "discomfort" to upper abdomen radiating to her back 1 week, 

constant for the past 2 days, worse with po intake and on supine position.  

Feels like her GERD but states nexium is not relieving symptoms.  No change in 

bowel movements, BRBPR, melena, nausea, vomiting, fever or chills.  Denies 

chest pain or shortness of breath.











See exam








Upper abd pain w/ bloating


Similar to pt's GERD


-EKG/labs r/o other etiology


-symptomatic relief














03/09/17 10:50








03/09/17 13:05


Patient reports significant improvement in symptoms and was able to tolerate by 

mouth in ED.  Will DC to follow-up with PMD and GI





03/09/17 14:19








<Jenny Castellanos - Last Filed: 03/09/17 14:18>





- Medical Decision Making





The patient was seen and evaluated in conjunction with PEYTON Yoo under my direct 

supervision, ancillary studies were reviewed.  I independently interviewed and 

evaluated the patient and I agree with the plan as outlined by PEYTON Castellanos . 





<Edmundo Wynne - Last Filed: 03/11/17 08:41>





*DC/Admit/Observation/Transfer





<Jenny Castellanos - Last Filed: 03/09/17 14:18>





<Edmundo Wynne - Last Filed: 03/11/17 08:41>


Diagnosis at time of Disposition: 


GERD (gastroesophageal reflux disease)


Qualifiers:


 Esophagitis presence: esophagitis presence not specified Qualified Code(s): 

K21.9 - Gastro-esophageal reflux disease without esophagitis





- Discharge Dispostion


Disposition: HOME


Condition at time of disposition: Improved





- Prescriptions


Prescriptions: 


Mag Hydrox/Al Hydrox/Simeth [Mylanta Suspension -] 30 ml PO Q6H #1 bottle


Famotidine [Pepcid] 20 mg PO DAILY #14 tablet





- Referrals


Referrals: 


Carlos Luna MD [Primary Care Provider] - 


Gene Cary MD [Staff Physician] - 





- Patient Instructions


Printed Discharge Instructions:  DI for Gastroesophageal Reflux Disease (GERD)


Additional Instructions: 





Destiney la medicacin segn lo prescrito y el seguimiento con sanders PMD y el Dr. Cary de la gastroenterologa


Print Language: Irish

## 2017-05-10 ENCOUNTER — HOSPITAL ENCOUNTER (EMERGENCY)
Dept: HOSPITAL 74 - JERFT | Age: 77
Discharge: HOME | End: 2017-05-10
Payer: COMMERCIAL

## 2017-05-10 VITALS — TEMPERATURE: 97.8 F

## 2017-05-10 VITALS — SYSTOLIC BLOOD PRESSURE: 125 MMHG | DIASTOLIC BLOOD PRESSURE: 66 MMHG | HEART RATE: 60 BPM

## 2017-05-10 VITALS — BODY MASS INDEX: 26.6 KG/M2

## 2017-05-10 DIAGNOSIS — I25.10: ICD-10-CM

## 2017-05-10 DIAGNOSIS — Z85.828: ICD-10-CM

## 2017-05-10 DIAGNOSIS — Z79.01: ICD-10-CM

## 2017-05-10 DIAGNOSIS — I10: ICD-10-CM

## 2017-05-10 DIAGNOSIS — Z88.0: ICD-10-CM

## 2017-05-10 DIAGNOSIS — E78.00: ICD-10-CM

## 2017-05-10 DIAGNOSIS — I48.91: ICD-10-CM

## 2017-05-10 DIAGNOSIS — Z95.0: ICD-10-CM

## 2017-05-10 DIAGNOSIS — K76.0: ICD-10-CM

## 2017-05-10 DIAGNOSIS — M25.512: Primary | ICD-10-CM

## 2017-05-10 LAB
ALBUMIN SERPL-MCNC: 3.4 G/DL (ref 3.4–5)
ALP SERPL-CCNC: 77 U/L (ref 45–117)
ALT SERPL-CCNC: 16 U/L (ref 12–78)
ANION GAP SERPL CALC-SCNC: 12 MMOL/L (ref 8–16)
AST SERPL-CCNC: 19 U/L (ref 15–37)
BASOPHILS # BLD: 1 % (ref 0–2)
BILIRUB SERPL-MCNC: 0.4 MG/DL (ref 0.2–1)
CALCIUM SERPL-MCNC: 9.2 MG/DL (ref 8.5–10.1)
CO2 SERPL-SCNC: 29 MMOL/L (ref 21–32)
COCKROFT - GAULT: 77.1
CREAT SERPL-MCNC: 0.7 MG/DL (ref 0.55–1.02)
DEPRECATED RDW RBC AUTO: 14.9 % (ref 11.6–15.6)
EOSINOPHIL # BLD: 3 % (ref 0–4.5)
GLUCOSE SERPL-MCNC: 87 MG/DL (ref 74–106)
INR BLD: 2.72 (ref 0.82–1.09)
MCH RBC QN AUTO: 28.7 PG (ref 25.7–33.7)
MCHC RBC AUTO-ENTMCNC: 32.4 G/DL (ref 32–36)
MCV RBC: 88.6 FL (ref 80–96)
NEUTROPHILS # BLD: 63.9 % (ref 42.8–82.8)
PLATELET # BLD AUTO: 219 K/MM3 (ref 134–434)
PMV BLD: 8.3 FL (ref 7.5–11.1)
PROT SERPL-MCNC: 6.9 G/DL (ref 6.4–8.2)
PT PNL PPP: 30.5 SEC (ref 9.98–11.88)
TROPONIN I SERPL-MCNC: < 0.02 NG/ML (ref 0–0.05)
WBC # BLD AUTO: 7.6 K/MM3 (ref 4–10)

## 2017-05-10 NOTE — EKG
Test Reason : 

Blood Pressure : ***/*** mmHG

Vent. Rate : 060 BPM     Atrial Rate : 060 BPM

   P-R Int : 278 ms          QRS Dur : 140 ms

    QT Int : 506 ms       P-R-T Axes : 075 -15 133 degrees

   QTc Int : 506 ms

 

Atrial-paced rhythm with prolonged AV conduction

LEFT BUNDLE BRANCH BLOCK

ABNORMAL ECG

WHEN COMPARED WITH ECG OF 09-MAR-2017 13:30,

NO SIGNIFICANT CHANGE WAS FOUND

Confirmed by ANGELICA MCDANIEL, GURPREET ([id number]) on 5/10/2017 1:43:33 PM

 

Referred By:             Confirmed By:GURPREET DOMINGUEZ MD

## 2017-05-10 NOTE — PDOC
History of Present Illness





- General


History Source: Patient


Exam Limitations: No Limitations





- History of Present Illness


Initial Comments: 


05/10/17 11:34


The patient is a 77 year old female, with a significant past medical history of 

Skin CA (earlobe), CAD, diverticulitis, HTN, hypercholesterolemia, afib (on 

coumadin), who presents to the emergency department with Left arm/shoulder pain 

for about 2 weeks.  The patient describes the pain as a dull constant pain, 

that is often made worse with weight bearing activities or overhead motions. 

She reports taking tylenol on Sunday with good alleviation of her pain. She 

reports previously having right shoulder pain, that was resolved with pain. She 

denies any UE numbness and tingling. She denies any loss of  strength. She 

denies any recent trauma or injury to her left arm/shoulder area. She denies 

recent fevers, chills, headache or dizziness. She denies recent chest pain or 

shortness of breath.





Allergies: Penicillins 


Past surgical history: Pacemaker (2011)


Social history: Nonsmoker. Denies EtOH use and recreational drug use. 


Primary Care Physician: 














<Gilbert Dejesus - Last Filed: 05/10/17 12:19>





- General


History Source: Patient





<Fransisca Barker - Last Filed: 05/10/17 12:47>





- General


Chief Complaint: Pain


Stated Complaint: LT ARM PAIN


Time Seen by Provider: 05/10/17 09:10





Past History





<Gilbert Dejesus - Last Filed: 05/10/17 12:19>





- Past Medical History


Anemia: No


Asthma: No


Cancer: Yes (SKIN CA- EARLOBE)


Cardiac Disorders: Yes (CORONARY ARTERY DISEASE)


CVA: No


COPD: No


CHF: No


Dementia: No


Diabetes: No


GI Disorders: Yes (GASTRITIS, diverticlitis)


 Disorders: No


HTN: Yes


Hypercholesterolemia: Yes


Liver Disease:  (FATTY LIVER)


Suicide Attempt (Hx): No


Seizures: No


Thyroid Disease: No





- Surgical History


Abdominal Surgery: No


Appendectomy: No


Cardiac Surgery: Yes (PACEMAKER IMPLANT-[facility]-7/2011)


Cholecystectomy: No


Lung Surgery: No


Neurologic Surgery: No


Orthopedic Surgery: No





- Family Disease History


Family Disease History: Heart Disease: Father





- Immunization History


Immunization Up to Date: Yes





- Psycho/Social/Smoking Cessation Hx


Anxiety: No


Suicidal Ideation: No


Smoking Status: Yes


Smoking History: Never smoked


Have you smoked in the past 12 months: No


Number of Cigarettes Smoked Daily: 0


If you are a former smoker, when did you quit?: 25 yrs


Hx Alcohol Use: No


Drug/Substance Use Hx: No


Substance Use Type: None


Hx Substance Use Treatment: No





<Fransisca Barker - Last Filed: 05/10/17 12:47>





- Past Medical History


Allergies/Adverse Reactions: 


 Allergies











Allergy/AdvReac Type Severity Reaction Status Date / Time


 


Penicillins AdvReac  Itching Verified 05/10/17 08:58











Home Medications: 


Ambulatory Orders





Warfarin Sodium [Coumadin] 5 mg PO ASDIR 10/16/14 


Sotalol HCl [Betapace -] 80 mg PO BID #28 tablet 10/19/14 


Valsartan [Diovan] 160 mg PO DAILY #14 tablet 07/24/15 


Warfarin Na [Coumadin -] 2.5 mg PO ASDIR 02/02/16 


Diltiazem Cd [Cardizem Cd -] 360 mg PO DAILY #30 cap.cd.24h 01/05/17 


Pravastatin Sodium [Pravachol (Nf)] 40 mg PO HS 05/10/17 











**Review of Systems





- Review of Systems


Able to Perform ROS?: Yes


Comments:: 





05/10/17 11:35


GENERAL/CONSTITUTIONAL: No fever or chills. No weakness.


HEAD, EYES, EARS, NOSE AND THROAT: No change in vision. No ear pain or 

discharge. No sore throat.


CARDIOVASCULAR: No chest pain or shortness of breath.


RESPIRATORY: No cough, wheezing, or hemoptysis.


GASTROINTESTINAL: No nausea, vomiting, diarrhea or constipation.


GENITOURINARY: No dysuria, frequency, or change in urination.


MUSCULOSKELETAL: +LEFT arm pain. No joint or muscle swelling or pain. No neck 

or back pain.


SKIN: No rash


NEUROLOGIC: No headache, vertigo, loss of consciousness, or change in strength/

sensation.


ENDOCRINE: No increased thirst. No abnormal weight change.


HEMATOLOGIC/LYMPHATIC: No anemia, easy bleeding, or history of blood clots.


ALLERGIC/IMMUNOLOGIC: No hives or skin allergy.








<Gilbert Dejesus - Last Filed: 05/10/17 12:19>





*Physical Exam





- Vital Signs


 Last Vital Signs











Temp Pulse Resp BP Pulse Ox


 


 97.8 F   67   20   123/77   99 


 


 05/10/17 08:54  05/10/17 08:54  05/10/17 08:54  05/10/17 08:54  05/10/17 08:54














- Physical Exam


Comments: 





05/10/17 12:21


GENERAL: Awake, alert, and fully oriented, in no acute distress


HEAD: No signs of trauma


EYES: PERRLA, EOMI, sclera anicteric, conjunctiva clear


ENT: Auricles normal inspection, hearing grossly normal, nares patent, Moist 

mucosa


NECK: Normal ROM, supple, JVD, or masses


LUNGS: Breath sounds equal, clear to auscultation bilaterally.  No wheezes, and 

no crackles


HEART: Regular rate and rhythm, normal S1 and S2, no murmurs, rubs or gallops


ABDOMEN: Soft, nontender, normoactive bowel sounds.  No guarding, no rebound.  

No masses


EXTREMITIES: LEFT Shoulder:Tenderness on the anterior shoulder. FROM of the 

shoulder, elbow, and wrist. Elbow and wrist are non tender. 


NEUROLOGICAL:  Normal speech, normal gait, moves all extremities equally. 

Speech clear. 


SKIN: Warm, Dry, normal turgor, no rashes or lesions noted.








<Gilbert Dejesus - Last Filed: 05/10/17 12:19>





- Vital Signs


 Last Vital Signs











Temp Pulse Resp BP Pulse Ox


 


 97.8 F   67   20   123/77   99 


 


 05/10/17 08:54  05/10/17 08:54  05/10/17 08:54  05/10/17 08:54  05/10/17 08:54














<Fransisca Barker - Last Filed: 05/10/17 12:47>





**Heart Score/ECG Review





- ECG Intrepretation


Comment:: 





05/10/17 09:19


60 bpm, paced, TWI I, AVL V5 - V6 LBB old, no acute changes comparison 3/9/17





<Fransisca Barker - Last Filed: 05/10/17 12:47>





ED Treatment Course





- LABORATORY


CBC & Chemistry Diagram: 


 05/10/17 10:15





 05/10/17 10:15





- ADDITIONAL ORDERS


Additional order review: 


 











  05/10/17





  10:15


 


RBC  4.22


 


MCV  88.6


 


MCHC  32.4


 


RDW  14.9


 


MPV  8.3


 


Neutrophils %  63.9


 


Lymphocytes %  20.7


 


Monocytes %  11.4 H


 


Eosinophils %  3.0


 


Basophils %  1.0














- RADIOLOGY


Radiograph Interpretation: 


05/10/17 10:43


CHEST X-RAY impressions reported by :


No acute disease in the chest. 








05/10/17 10:45


LEFT SHOULDER X-RAY impressions reported by :


No acute findings. 





- Medications


Given in the ED: 


ED Medications














Discontinued Medications














Generic Name Dose Route Start Last Admin





  Trade Name Keyana  PRN Reason Stop Dose Admin


 


Acetaminophen  650 mg 05/10/17 10:10 05/10/17 10:25





  Tylenol -  PO 05/10/17 10:11  650 mg





  ONCE ONE   Administration














<Gilbert Dejesus - Last Filed: 05/10/17 12:19>





- LABORATORY


CBC & Chemistry Diagram: 


 05/10/17 10:15





 05/10/17 10:15





<Fransisca Barker - Last Filed: 05/10/17 12:47>





Medical Decision Making





- Medical Decision Making





05/10/17 12:33


78 yo F with h/o HTN CAD pacemaker here wtih c/o left shoulder pain, has had 

for 2 weeks. worse wtih certain movements. improved with tylenol. last took 

yesterday. no f/c no cp no sob. no pal;itations. no weakness or numbness. has 

not seen an orthopedist. was given cream which she placedon right shoulder 

which helped. 


on exam left shoulder with anterior ttp,,from. elbow and wrist FROM, NT. n/v 

intact distally 5/5 bilat upper ext. no mildline spinal tendneress ( see above 

rest exam) differential atypical cardiac unlikley ( will check trop cxr ekg) 

xray shoulder r/o occult fx, arthritis. [place] dc with ortho fu. 





d/w dr. carlson, will see pt oupt for fu and ortho referral. xrays unremakrable. 

pain improved with tylenol only. ekg unchanged. labs unremarkable.





<Fransisca Barker - Last Filed: 05/10/17 12:47>





*DC/Admit/Observation/Transfer





- Attestations


Scribe Attestion: 





05/10/17 10:45





Documentation prepared by Gilbert Dejesus, acting as medical scribe for Fransisca Barker MD.








<Gilbert Dejesus - Last Filed: 05/10/17 12:19>





- Discharge Dispostion


Admit: No





<LuceroFransisca - Last Filed: 05/10/17 12:47>


Diagnosis at time of Disposition: 


 Shoulder pain, left





- Discharge Dispostion


Disposition: HOME





- Referrals


Referrals: 


Carlos Carlson MD [Primary Care Provider] - 





- Patient Instructions


Printed Discharge Instructions:  DI for Shoulder Pain


Additional Instructions: 


take tylenol 500 mg every 6 hr as needed for pain. follow up with dr. antony to 

discuss orthopedic referral for persistant pain left shoulder. you can also 

continue using the cream previously prescribed by your doctor. return for any 

problems or concerns.





- Post Discharge Activity


Work/School Note:  Back to Work

## 2017-11-14 ENCOUNTER — HOSPITAL ENCOUNTER (EMERGENCY)
Dept: HOSPITAL 74 - JERFT | Age: 77
Discharge: HOME | End: 2017-11-14
Payer: COMMERCIAL

## 2017-11-14 VITALS — DIASTOLIC BLOOD PRESSURE: 46 MMHG | SYSTOLIC BLOOD PRESSURE: 120 MMHG | TEMPERATURE: 97.9 F | HEART RATE: 60 BPM

## 2017-11-14 VITALS — BODY MASS INDEX: 27.1 KG/M2

## 2017-11-14 DIAGNOSIS — M47.892: Primary | ICD-10-CM

## 2017-11-14 DIAGNOSIS — M54.2: ICD-10-CM

## 2017-11-14 DIAGNOSIS — M12.9: ICD-10-CM

## 2017-11-14 DIAGNOSIS — G89.29: ICD-10-CM

## 2017-11-14 DIAGNOSIS — M54.5: ICD-10-CM

## 2017-11-14 NOTE — PDOC
History of Present Illness





- General


Chief Complaint: Back Pain


Stated Complaint: PAIN/ BACK, BOTH ARMS


Time Seen by Provider: 11/14/17 12:47


History Source: Patient


Exam Limitations: No Limitations





- History of Present Illness


Initial Comments: 





11/14/17 13:20


77 yr female with c/o chronic back pain and neck pain has history of arthritis, 

taking tylenol with no relief. PT denies injury also suffering from nasal 

congestion. Pt denies chest pain no abd pain no SOB. 


11/14/17 13:33





Occurred: reports: other (3 weeks )


Severity: reports: mild


Pain Location: reports: back, neck


Method of Injury: No: unknown, assault, direct blow, fall, motor vehicle crash, 

other





Past History





- Past Medical History


Allergies/Adverse Reactions: 


 Allergies











Allergy/AdvReac Type Severity Reaction Status Date / Time


 


Penicillins AdvReac  Itching Verified 11/14/17 12:08











Home Medications: 


Ambulatory Orders





Warfarin Sodium [Coumadin] 5 mg PO ASDIR 10/16/14 


Sotalol HCl [Betapace -] 80 mg PO BID #28 tablet 10/19/14 


Valsartan [Diovan] 160 mg PO DAILY #14 tablet 07/24/15 


Warfarin Na [Coumadin -] 2.5 mg PO ASDIR 02/02/16 


Diltiazem Cd [Cardizem Cd -] 360 mg PO DAILY #30 cap.cd.24h 01/05/17 


Pravastatin Sodium [Pravachol (Nf)] 40 mg PO HS 05/10/17 


Acetaminophen W/ Codeine #3 [Tylenol # 3 -] 1 tab PO Q6H PRN #12 tablet MDD 4 

tabs 11/14/17 


Fluticasone Prop 0.05% Nasal [Flonase -] 1 - 2 spray NS DAILY #1 spray.pump 11/ 14/17 


Lidocaine 5% Patch [Lidoderm -] 1 patch TP DAILY #3 patch 11/14/17 








Anemia: No


Asthma: No


Cancer: Yes (SKIN CA- EARLOBE)


Cardiac Disorders: Yes (CORONARY ARTERY DISEASE)


CVA: No


COPD: No


CHF: No


Dementia: No


Diabetes: No


GI Disorders: Yes (GASTRITIS, diverticlitis)


 Disorders: No


HTN: Yes


Hypercholesterolemia: Yes


Liver Disease:  (FATTY LIVER)


Seizures: No


Thyroid Disease: No





- Surgical History


Abdominal Surgery: No


Appendectomy: No


Cardiac Surgery: Yes (PACEMAKER IMPLANT-[facility]-7/2011)


Cholecystectomy: No


Lung Surgery: No


Neurologic Surgery: No


Orthopedic Surgery: No





- Family Disease History


Family Disease History: Heart Disease: Father





- Immunization History


Immunization Up to Date: Yes





- Suicide/Smoking/Psychosocial Hx


Smoking Status: Yes


Smoking History: Never smoked


Have you smoked in the past 12 months: No


Number of Cigarettes Smoked Daily: 0


If you are a former smoker, when did you quit?: 25 yrs


Information on smoking cessation initiated: No


Hx Alcohol Use: No


Drug/Substance Use Hx: No


Substance Use Type: None


Hx Substance Use Treatment: No





Trauma Specific PMHX





- Complaint Specific PMHX


Arthritis: Yes


Back Injury: No


Neck Injury: No


Hx Sacro Iliac Joint Dysfunction: No





**Review of Systems





- Review of Systems


Able to Perform ROS?: Yes


Is the patient limited English proficient: No


Constitutional: No: Symptoms Reported


HEENTM: No: Symptoms Reported


Respiratory: No: Symptoms reported


Cardiac (ROS): No: Symptoms Reported


ABD/GI: No: Symptoms Reported


: No: Symptoms Reported


Musculoskeletal: Yes: Symptoms Reported


Integumentary: Yes: Symptoms Reported





*Physical Exam





- Vital Signs


 Last Vital Signs











Temp Pulse Resp BP Pulse Ox


 


 97.9 F   60   16   120/46   96 


 


 11/14/17 12:09  11/14/17 12:09  11/14/17 12:09  11/14/17 12:09  11/14/17 12:09














- Physical Exam


General Appearance: Yes: Nourished, Appropriately Dressed


HEENT: positive: EOMI, DEWEY, Normal ENT Inspection, TMs Normal, Pharynx Normal


Neck: positive: Trachea midline, Normal Thyroid, Supple.  negative: Tender, 

Rigidity, Tender lateral, Tender midline


Respiratory/Chest: positive: Lungs Clear, Normal Breath Sounds, Other (cough ).

  negative: Chest Tender, Crackles, Rales, Rhonchi, Stridor, Wheezing


Cardiovascular: positive: Regular Rhythm, Regular Rate


Gastrointestinal/Abdominal: positive: Normal Bowel Sounds, Flat, Soft.  negative

: Tender


Lymphatic: negative: Adenopathy


Musculoskeletal: positive: Normal Inspection, Other (lumbar paraspinal soft 

tissue ttp neg vetebral tenderness ).  negative: CVA Tenderness, CVA Tenderness 

(R), CVA Tenderness (L), Decreased Range of Motion, Muscle Spasm, Vertebral 

Tenderness


Extremity: positive: Normal Capillary Refill, Normal Inspection, Normal Range 

of Motion


Integumentary: positive: Normal Color, Dry, Warm


Neurologic: positive: Fully Oriented, Alert, Normal Mood/Affect, Normal Response

, Motor Strength 5/5





ED Treatment Course





- RADIOLOGY


Radiology Studies Ordered: 














 Category Date Time Status


 


 CHEST PA & LAT [RAD] [facility]  11/14/17 13:16 Ordered


 


 SPINE-CERVICAL [RAD] [facility]  11/14/17 13:15 Ordered














- Medications


Given in the ED: 


ED Medications














Discontinued Medications














Generic Name Dose Route Start Last Admin





  Trade Name Freq  PRN Reason Stop Dose Admin


 


Acetaminophen/Codeine Phosphate  1 tab  11/14/17 13:17  11/14/17 13:20





  Tylenol # 3 -  PO  11/14/17 13:18  1 tab





  ONCE ONE   Administration














Medical Decision Making





- Medical Decision Making





11/14/17 13:35


cc: chronic pain neck and back 


      history of arthritis neg sob has dry cough and nasal congestion for days 


      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 


     pt asking for pain meds 


will get cervical spine xray and chest xray 


will give tylenol #3 for pain 


will refer to rheumotology 





pt and her son agree with plan of care 





*DC/Admit/Observation/Transfer


Diagnosis at time of Disposition: 


 Chronic neck pain





Chronic low back pain


Qualifiers:


 Back pain laterality: bilateral Sciatica presence: without sciatica Qualified 

Code(s): M54.5 - Low back pain








- Discharge Dispostion


Disposition: HOME


Condition at time of disposition: Good





- Prescriptions


Prescriptions: 


Acetaminophen W/ Codeine #3 [Tylenol # 3 -] 1 tab PO Q6H PRN #12 tablet MDD 4 

tabs


 PRN Reason: Severe Pain


Fluticasone Prop 0.05% Nasal [Flonase -] 1 - 2 spray NS DAILY #1 spray.pump


Lidocaine 5% Patch [Lidoderm -] 1 patch TP DAILY #3 patch





- Referrals


Referrals: 


Radha Kang MD [Primary Care Provider] - 





- Patient Instructions


Additional Instructions: 


follow with the orthopedist for follow up next week


apply lidocaine patches to area of pain as directed


take tylenol #3 for severe pain 


take tylenol regular strength for mild to moderate pain 





return to ER for any worsening symptoms 





- Post Discharge Activity


Forms/Work/School Notes:  Back to Work

## 2017-12-23 ENCOUNTER — HOSPITAL ENCOUNTER (INPATIENT)
Dept: HOSPITAL 74 - JER | Age: 77
LOS: 3 days | Discharge: HOME | DRG: 393 | End: 2017-12-26
Attending: FAMILY MEDICINE | Admitting: FAMILY MEDICINE
Payer: COMMERCIAL

## 2017-12-23 VITALS — BODY MASS INDEX: 26.6 KG/M2

## 2017-12-23 DIAGNOSIS — K64.8: Primary | ICD-10-CM

## 2017-12-23 DIAGNOSIS — I25.10: ICD-10-CM

## 2017-12-23 DIAGNOSIS — I48.0: ICD-10-CM

## 2017-12-23 DIAGNOSIS — K62.5: ICD-10-CM

## 2017-12-23 DIAGNOSIS — Z85.828: ICD-10-CM

## 2017-12-23 DIAGNOSIS — K76.0: ICD-10-CM

## 2017-12-23 DIAGNOSIS — K57.33: ICD-10-CM

## 2017-12-23 DIAGNOSIS — K21.9: ICD-10-CM

## 2017-12-23 DIAGNOSIS — E78.5: ICD-10-CM

## 2017-12-23 DIAGNOSIS — I11.9: ICD-10-CM

## 2017-12-23 DIAGNOSIS — K29.60: ICD-10-CM

## 2017-12-23 DIAGNOSIS — M19.011: ICD-10-CM

## 2017-12-23 DIAGNOSIS — Z79.01: ICD-10-CM

## 2017-12-23 DIAGNOSIS — Z95.1: ICD-10-CM

## 2017-12-23 DIAGNOSIS — Z95.0: ICD-10-CM

## 2017-12-23 LAB
ALBUMIN SERPL-MCNC: 3.4 G/DL (ref 3.4–5)
ALP SERPL-CCNC: 91 U/L (ref 45–117)
ALT SERPL-CCNC: 23 U/L (ref 12–78)
ANION GAP SERPL CALC-SCNC: 9 MMOL/L (ref 8–16)
APPEARANCE UR: CLEAR
AST SERPL-CCNC: 26 U/L (ref 15–37)
BASOPHILS # BLD: 1.4 % (ref 0–2)
BILIRUB SERPL-MCNC: 0.3 MG/DL (ref 0.2–1)
BILIRUB UR STRIP.AUTO-MCNC: NEGATIVE MG/DL
BUN SERPL-MCNC: 14 MG/DL (ref 7–18)
CALCIUM SERPL-MCNC: 9.2 MG/DL (ref 8.5–10.1)
CHLORIDE SERPL-SCNC: 106 MMOL/L (ref 98–107)
CO2 SERPL-SCNC: 26 MMOL/L (ref 21–32)
COLOR UR: (no result)
CREAT SERPL-MCNC: 0.8 MG/DL (ref 0.55–1.02)
DEPRECATED RDW RBC AUTO: 15.9 % (ref 11.6–15.6)
EOSINOPHIL # BLD: 3.6 % (ref 0–4.5)
GLUCOSE SERPL-MCNC: 115 MG/DL (ref 74–106)
HCT VFR BLD CALC: 37.3 % (ref 32.4–45.2)
HGB BLD-MCNC: 11.9 GM/DL (ref 10.7–15.3)
INR BLD: 2.06 (ref 0.82–1.09)
KETONES UR QL STRIP: NEGATIVE
LEUKOCYTE ESTERASE UR QL STRIP.AUTO: NEGATIVE
LIPASE SERPL-CCNC: 132 U/L (ref 73–393)
LYMPHOCYTES # BLD: 18.9 % (ref 8–40)
MCH RBC QN AUTO: 28.3 PG (ref 25.7–33.7)
MCHC RBC AUTO-ENTMCNC: 31.8 G/DL (ref 32–36)
MCV RBC: 89 FL (ref 80–96)
MONOCYTES # BLD AUTO: 10.1 % (ref 3.8–10.2)
MUCOUS THREADS URNS QL MICRO: (no result)
NEUTROPHILS # BLD: 66 % (ref 42.8–82.8)
NITRITE UR QL STRIP: NEGATIVE
PH UR: 7 [PH] (ref 5–8)
PLATELET # BLD AUTO: 242 K/MM3 (ref 134–434)
PMV BLD: 9.7 FL (ref 7.5–11.1)
POTASSIUM SERPLBLD-SCNC: 4.5 MMOL/L (ref 3.5–5.1)
PROT SERPL-MCNC: 7.1 G/DL (ref 6.4–8.2)
PROT UR QL STRIP: NEGATIVE
PROT UR QL STRIP: NEGATIVE
PT PNL PPP: 23.3 SEC (ref 9.98–11.88)
RBC # BLD AUTO: 4.19 M/MM3 (ref 3.6–5.2)
RBC # UR STRIP: (no result) /UL
SODIUM SERPL-SCNC: 141 MMOL/L (ref 136–145)
SP GR UR: 1.01 (ref 1–1.03)
UROBILINOGEN UR STRIP-MCNC: NEGATIVE MG/DL (ref 0.2–1)
WBC # BLD AUTO: 9.5 K/MM3 (ref 4–10)

## 2017-12-23 PROCEDURE — G0378 HOSPITAL OBSERVATION PER HR: HCPCS

## 2017-12-23 RX ADMIN — WARFARIN SCH MG: 2.5 TABLET ORAL at 19:30

## 2017-12-23 RX ADMIN — SODIUM CHLORIDE SCH MLS/HR: 4.5 INJECTION, SOLUTION INTRAVENOUS at 20:11

## 2017-12-23 RX ADMIN — SOTALOL HYDROCHLORIDE SCH MG: 80 TABLET ORAL at 23:29

## 2017-12-23 NOTE — EKG
Test Reason : 

Blood Pressure : ***/*** mmHG

Vent. Rate : 060 BPM     Atrial Rate : 060 BPM

   P-R Int : 284 ms          QRS Dur : 140 ms

    QT Int : 516 ms       P-R-T Axes : 072 -21 100 degrees

   QTc Int : 516 ms

 

Atrial-paced rhythm with prolonged AV conduction

LEFT BUNDLE BRANCH BLOCK

ABNORMAL ECG

WHEN COMPARED WITH ECG OF 10-MAY-2017 09:02,

NO SIGNIFICANT CHANGE WAS FOUND

Confirmed by KIMI STUART MD ([id number]) on 12/23/2017 4:13:38 PM

 

Referred By:             Confirmed By:KIMI STUART MD

## 2017-12-23 NOTE — PDOC
History of Present Illness





- General


Chief Complaint: Rectal Bleed


Stated Complaint: RECTAL BLEED


Time Seen by Provider: 12/23/17 11:15


History Source: Patient, Family


Exam Limitations: No Limitations





- History of Present Illness


Initial Comments: 


This is a 77 YOF with h/o CAD (s/p CABG), A-fib (on warfarin with last INR 2.4 

last month), and gastritis who presents with 3-4 days of burning diffuse 

abdominal pain worse in the epigastrium, nausea after she eats, and BRBPR on 

toilet paper when she wipes. She does not have any noticeable blood within the 

stool, and her stool is otherwise normal brown color (not black). She recently 

started diclofenac for chronic shoulder pain related to arthritis. She 

otherwise has not had any recent symptoms (no fever, chills, vomiting, diarrhea

, constipation, cough, SOB, chest pain, dizziness, headache, LOC, etc). She 

last had a colonoscopy about 3 years ago which was normal.





Past History





- Past Medical History


Allergies/Adverse Reactions: 


 Allergies











Allergy/AdvReac Type Severity Reaction Status Date / Time


 


Penicillins AdvReac  Itching Verified 12/23/17 11:06











Home Medications: 


Ambulatory Orders





Warfarin Sodium [Coumadin] 5 mg PO ASDIR 10/16/14 


Sotalol HCl [Betapace -] 80 mg PO BID #28 tablet 10/19/14 


Valsartan [Diovan] 160 mg PO DAILY #14 tablet 07/24/15 


Warfarin Na [Coumadin -] 2.5 mg PO ASDIR 02/02/16 


Diltiazem Cd [Cardizem Cd -] 360 mg PO DAILY #30 cap.cd.24h 01/05/17 


Pravastatin Sodium [Pravachol (Nf)] 40 mg PO HS 05/10/17 


Acetaminophen W/ Codeine #3 [Tylenol # 3 -] 1 tab PO Q6H PRN #12 tablet MDD 4 

tabs 11/14/17 


Fluticasone Prop 0.05% Nasal [Flonase -] 1 - 2 spray NS DAILY #1 spray.pump 11/ 14/17 


Lidocaine 5% Patch [Lidoderm -] 1 patch TP DAILY #3 patch 11/14/17 








Anemia: No


Asthma: No


Cancer: Yes (SKIN CA- EARLOBE)


Cardiac Disorders: Yes (CORONARY ARTERY DISEASE,Pacemaker)


CVA: No


COPD: No


CHF: No


Dementia: No


Diabetes: No


GI Disorders: Yes (GASTRITIS, diverticlitis)


 Disorders: No


HTN: Yes


Hypercholesterolemia: Yes


Liver Disease:  (FATTY LIVER)


Seizures: No


Thyroid Disease: No





- Surgical History


Abdominal Surgery: No


Appendectomy: No


Cardiac Surgery: Yes (PACEMAKER IMPLANT-[facility]-7/2011)


Cholecystectomy: No


Lung Surgery: No


Neurologic Surgery: No


Orthopedic Surgery: No





- Family Disease History


Family Disease History: Heart Disease: Father





- Immunization History


Immunization Up to Date: Yes





- Suicide/Smoking/Psychosocial Hx


Smoking Status: Yes


Smoking History: Never smoked


Have you smoked in the past 12 months: No


Number of Cigarettes Smoked Daily: 0


If you are a former smoker, when did you quit?: 25 yrs


Information on smoking cessation initiated: No


Hx Alcohol Use: No


Drug/Substance Use Hx: No


Substance Use Type: None


Hx Substance Use Treatment: No





**Review of Systems





- Review of Systems


Able to Perform ROS?: Yes


Constitutional: No: Chills, Fever, Unexplained wgt Loss


HEENTM: No: Nose Congestion, Throat Pain


Respiratory: No: Cough, Shortness of Breath


Cardiac (ROS): No: Chest Pain, Palpitations


ABD/GI: Yes: Nausea, Rectal Bleeding, Other (abdominal pain).  No: Abd. Pain w/ 

defecation, Constipated, Diarrhea, Vomiting


: No: Burning, Dysuria


Musculoskeletal: No: Back Pain, Neck Pain


Integumentary: No: Bruising, Rash


Neurological: No: Headache, Numbness, Tingling, Weakness, Dizziness


Endocrine: No: Unexplained Weight Gain, Unexplained Weight Loss





*Physical Exam





- Vital Signs


 Last Vital Signs











Temp Pulse Resp BP Pulse Ox


 


 97.9 F   63   18   131/69   98 


 


 12/23/17 10:59  12/23/17 10:59  12/23/17 10:59  12/23/17 10:59  12/23/17 10:59














- Physical Exam


General Appearance: Yes: Nourished, Appropriately Dressed, Other (very pleasant 

and appropriately answering older adult female with son at bedside, no distress

, appears comfortable).  No: Apparent Distress


HEENT: positive: EOMI, Normal Voice, Hearing Grossly Normal.  negative: Scleral 

Icterus (R), Scleral Icterus (L), Nasal Congestion


Neck: positive: Trachea midline, Supple.  negative: Tender, Rigid


Respiratory/Chest: positive: Lungs Clear, Normal Breath Sounds.  negative: 

Respiratory Distress, Crackles, Rhonchi, Stridor, Wheezing


Cardiovascular: positive: Regular Rhythm, Regular Rate.  negative: Murmur


Gastrointestinal/Abdominal: positive: Normal Bowel Sounds, Soft.  negative: 

Tender, Organomegaly, Pulsatile Mass, Guarding


Musculoskeletal: positive: Normal Inspection.  negative: Decreased Range of 

Motion, Vertebral Tenderness


Extremity: positive: Normal Capillary Refill, Normal Inspection, Normal Range 

of Motion.  negative: Tender, Cyanosis


Integumentary: positive: Normal Color, Dry, Warm.  negative: Erythema, Rash, 

Bruising


Neurologic: positive: CNs II-XII NML intact, Fully Oriented, Alert, Normal Mood/

Affect, Normal Response, Motor Strength 5/5





ED Treatment Course





- LABORATORY


CBC & Chemistry Diagram: 


 12/23/17 12:21





 12/23/17 12:21





Medical Decision Making





- Medical Decision Making


77F with h/o A-fib on warfarin, CAD s/p CABG, and gastritis who p/w burning 

epigastric/LUQ pain and PRBPR.


On exam VS wnl and the patient is very pleasant, no distress, epigastric and 

LUQ ttp, rectal with small BRBPR.


DDX IBNLT gastritis, PUD, cholecystitis, gallstone pancreatitis, AAA, 

diverticulitis, hemorrhoid bleeding, etc.


Ordered is CBCD, CMP, coags, T&S, cardiac panel, EKG, FOBT, CT abdomen/pelvis 

with IV contrast.





12/23/17 15:31


Patient's CT abdomen pelvis results with diverticulosis and mild diverticulitis.


Cipro Flagyl PO are ordered and patient is told she can take sips of soup.





12/23/17 15:45


[facility] microblogged for admission to obs med/surg for recurrent 

diverticulitis w/ diverticulosis.





12/23/17 17:20


Spoke with [facility] regarding admission; they recommend dosing with ceftriaxone.


Patient states that she has had these symptoms twice before.


She saw Dr. Granda as an outpatient for endoscopy but has not seen him for 3-4 

years.








*DC/Admit/Observation/Transfer


Diagnosis at time of Disposition: 


 Diverticulitis





Diverticulosis


Qualifiers:


 Diverticulosis site: diverticulosis of large intestine Diverticulosis bleeding

: diverticulosis with bleeding Qualified Code(s): K57.31 - Diverticulosis of 

large intestine without perforation or abscess with bleeding








- Discharge Dispostion


Condition at time of disposition: Guarded


Admit: Yes





- Referrals





- Patient Instructions





- Post Discharge Activity

## 2017-12-23 NOTE — HP
Admitting History and Physical





- Primary Care Physician


PCP: Radha Kang I





- Admission


Chief Complaint: abdominal pain, hematochezia


History of Present Illness: 





This is a 77 year old female with pmhx HTN, CAD, sick sinus syndrome s/p ppm, A 

fib (on coumadin) presented to the ED with x2 days of abdominal pain and bright 

red blood per rectum. Pt denies hemaemesis, but reports nausea. Currently, she 

does not have any abdominal pain, stool guiac is negative, hgb stable. 


In the past she was seen by Dr. Lee had an EGD with polys removed and 

diverticulosis, d/w Dr. Granda, one polyp was seen as high grade and patient 

will need repeat EGD. 











History Source: Patient, Medical Record


Limitations to Obtaining History: Language Barrier





- Past Medical History


Cardiovascular: Yes: AFIB, CAD, HTN, Hyperlipdemia





- Past Surgical History


Past Surgical History: Yes: Permanent Pacemaker





- Smoking History


Smoking history: Never smoked


Have you smoked in the past 12 months: No


Aproximately how many cigarettes per day: 0


If you are a former smoker, when did you quit?: 25 yrs





- Alcohol/Substance Use


Hx Alcohol Use: No


History of Substance Use: reports: None





- Social History


ADL: Independent





Home Medications





- Allergies


Allergies/Adverse Reactions: 


 Allergies











Allergy/AdvReac Type Severity Reaction Status Date / Time


 


Penicillins AdvReac Mild Itching Verified 12/23/17 19:10














- Home Medications


Home Medications: 


Ambulatory Orders





Warfarin Sodium [Coumadin] 5 mg PO ASDIR 10/16/14 


Sotalol HCl [Betapace -] 80 mg PO BID #28 tablet 10/19/14 


Valsartan [Diovan] 160 mg PO DAILY #14 tablet 07/24/15 


Warfarin Na [Coumadin -] 2.5 mg PO ASDIR 02/02/16 


Diltiazem Cd [Cardizem Cd -] 360 mg PO DAILY #30 cap.cd.24h 01/05/17 


Pravastatin Sodium [Pravachol (Nf)] 40 mg PO HS 05/10/17 


Acetaminophen W/ Codeine #3 [Tylenol # 3 -] 1 tab PO Q6H PRN #12 tablet MDD 4 

tabs 11/14/17 


Fluticasone Prop 0.05% Nasal [Flonase -] 1 - 2 spray NS DAILY #1 spray.pump 11/ 14/17 


Lidocaine 5% Patch [Lidoderm -] 1 patch TP DAILY #3 patch 11/14/17 











Review of Systems





- Review of Systems


Constitutional: reports: No Symptoms


Eyes: reports: No Symptoms


HENT: reports: No Symptoms


Neck: reports: No Symptoms


Cardiovascular: reports: No Symptoms


Respiratory: reports: No Symptoms


Gastrointestinal: reports: Abdominal Pain, Other (BRBPR)


Genitourinary: reports: No Symptoms


Musculoskeletal: reports: No Symptoms


Integumentary: reports: No Symptoms


Neurological: reports: No Symptoms


Endocrine: reports: No Symptoms


Hematology/Lymphatic: reports: No Symptoms


Psychiatric: reports: No Symptoms





Physical Examination


Vital Signs: 


 Vital Signs











Temperature  98.3 F   12/23/17 14:18


 


Pulse Rate  72   12/23/17 14:18


 


Respiratory Rate  20   12/23/17 14:18


 


Blood Pressure  133/75   12/23/17 14:18


 


O2 Sat by Pulse Oximetry (%)  98   12/23/17 14:18











Constitutional: Yes: No Distress


Eyes: Yes: Conjunctiva Clear


HENT: Yes: Atraumatic


Neck: Yes: Supple


Cardiovascular: Yes: Regular Rate and Rhythm, S1, S2


Respiratory: Yes: Regular, Other (scatterd crackles)


Gastrointestinal: Yes: Normal Bowel Sounds, Soft


Musculoskeletal: Yes: Other (R shoulder tenderness)


Extremities: Yes: WNL


Edema: No


Neurological: Yes: Alert, Oriented, Cran Nerves II-XII Intact


Labs: 


 CBC, BMP





 12/23/17 12:21 





 12/23/17 12:21 











Imaging





- Results


Cat Scan: Report Reviewed (diverticulosis, with mild acute diverticulitis of 

sigmoid colon)





Problem List





- Problems


(1) Diverticulitis


Code(s): K57.92 - DVTRCLI OF INTEST, PART UNSP, W/O PERF OR ABSCESS W/O BLEED   





(2) Diverticulosis


Code(s): K57.90 - DVRTCLOS OF INTEST, PART UNSP, W/O PERF OR ABSCESS W/O BLEED 

  


Qualifiers: 


   Diverticulosis site: diverticulosis of large intestine   Diverticulosis 

bleeding: diverticulosis with bleeding   Qualified Code(s): K57.31 - 

Diverticulosis of large intestine without perforation or abscess with bleeding 

  





(3) Afib


Code(s): I48.91 - UNSPECIFIED ATRIAL FIBRILLATION   


Qualifiers: 


   Atrial fibrillation type: paroxysmal   Qualified Code(s): I48.0 - Paroxysmal 

atrial fibrillation   





(4) Coronary artery disease


Code(s): I25.10 - ATHSCL HEART DISEASE OF NATIVE CORONARY ARTERY W/O ANG PCTRS 

  


Qualifiers: 


   Coronary Disease-Associated Artery/Lesion type: native artery   Native vs. 

transplanted heart: native heart   Associated angina: with stable angina   

Qualified Code(s): I25.118 - Atherosclerotic heart disease of native coronary 

artery with other forms of angina pectoris   





(5) Hyperlipidemia


Code(s): E78.5 - HYPERLIPIDEMIA, UNSPECIFIED   


Qualifiers: 


   Hyperlipidemia type: pure hypercholesterolemia   Qualified Code(s): E78.00 - 

Pure hypercholesterolemia, unspecified; E78.0 - Pure hypercholesterolemia   





(6) Hypertension


Code(s): I10 - ESSENTIAL (PRIMARY) HYPERTENSION   


Qualifiers: 


   Hypertension type: essential hypertension   Qualified Code(s): I10 - 

Essential (primary) hypertension   





Assessment/Plan





Assessment: 77 year old female placed under observation with acute 

diverticulitis 





Plan: 





1. Acute mild diverticulitis 


- Start levaquin/ flagyl 


- Hydrate and perfuse with 1L 1/2 @ 75 cc/hr 


- If improved in AM, home with cipro/flagyl x7 days 


- Jan 2 outpt follow up with Dr. Granda 





2. CAD/ diastolic dysfunciton 


- Diovan 160mg dialy 


- Sotolol 80mg BID 





3. HTN 


- Cardizem CD 360mg dialy 





4. A fib 


- Rate controlled


- Continue coumadin per INR and as directed


- No bleeding currently, dose coumadin 5mg HS 


- Cont cardizem, sotolol 








 





Visit type





- Emergency Visit


Emergency Visit: Yes


ED Registration Date: 12/23/17


Care time: The patient presented to the Emergency Department on the above date 

and was hospitalized for further evaluation of their emergent condition.





- New Patient


This patient is new to me today: Yes


Date on this admission: 12/23/17





- Critical Care


Critical Care patient: No

## 2017-12-23 NOTE — PDOC
Attending Attestation





- Resident


Resident Name: Cindy Espinoza





- ED Attending Attestation


I have performed the following: I have examined & evaluated the patient, The 

case was reviewed & discussed with the resident, I agree w/resident's findings 

& plan, Exceptions are as noted





- HPI


HPI: 





12/23/17 11:53


78yo F hx gastritis, CAD s/p bypass, AF on coumadin, diverticulitis, HL p/w 4 

days of epigastric abd pain radiating to LUQ and LLQ. Reports it feels like 

gastritis but reports multiple episodes of brb on toilet paper after she has BM 

for the last 4 days. Denies dark or tarry stools. Denies constipation or 

diarrhea. Denies pain when defecating. Denies f/c, N/V, CP, SOB, headache, 

focal weakness or numbness.








- Physicial Exam


PE: 





12/23/17 12:02


agree with resident exam





- Medical Decision Making





12/23/17 12:03


77-year-old female with multiple medical problems including A. fib on Coumadin, 

CAD, diverticulitis presents with epigastric abdominal pain radiating to the 

left upper quadrant and left lower quadrant associated with multiple episodes 

of painless bright red blood on toilet paper. Rectal exam with blood in rectal 

vault. Vitals are unremarkable. Differential is wide and includes but not 

limited to supratherapeutic INR, diverticulosis, diverticulitis, peptic ulcer 

disease (possibly due to NSAIDS, but unlikely with BRBPR). Will send a troponin 

as well given cardiac risk factors and epigastric abd pain. 





12/23/17 18:00


CTAP with recurrent diverticulosis and diverticulitis. Pt dosed cipro/flagyl, 

admitted for further management.





Case discussed in detail with admitting physician including history, physical 

exam and ancillary studies.





Admitting physician has assumed care for the patient, will follow all pending 

diagnostics and will complete the evaluation and treatment.

## 2017-12-24 LAB
ALBUMIN SERPL-MCNC: 3.2 G/DL (ref 3.4–5)
ALP SERPL-CCNC: 86 U/L (ref 45–117)
ALT SERPL-CCNC: 24 U/L (ref 12–78)
ANION GAP SERPL CALC-SCNC: 10 MMOL/L (ref 8–16)
AST SERPL-CCNC: 20 U/L (ref 15–37)
BASOPHILS # BLD: 1.1 % (ref 0–2)
BILIRUB SERPL-MCNC: 0.3 MG/DL (ref 0.2–1)
BUN SERPL-MCNC: 10 MG/DL (ref 7–18)
CALCIUM SERPL-MCNC: 9.1 MG/DL (ref 8.5–10.1)
CHLORIDE SERPL-SCNC: 107 MMOL/L (ref 98–107)
CO2 SERPL-SCNC: 25 MMOL/L (ref 21–32)
CREAT SERPL-MCNC: 0.7 MG/DL (ref 0.55–1.02)
DEPRECATED RDW RBC AUTO: 15.4 % (ref 11.6–15.6)
EOSINOPHIL # BLD: 3.4 % (ref 0–4.5)
GLUCOSE SERPL-MCNC: 97 MG/DL (ref 74–106)
HCT VFR BLD CALC: 35.8 % (ref 32.4–45.2)
HGB BLD-MCNC: 11.5 GM/DL (ref 10.7–15.3)
INR BLD: 2.49 (ref 0.82–1.09)
LYMPHOCYTES # BLD: 20 % (ref 8–40)
MCH RBC QN AUTO: 28.6 PG (ref 25.7–33.7)
MCHC RBC AUTO-ENTMCNC: 32.1 G/DL (ref 32–36)
MCV RBC: 89.1 FL (ref 80–96)
MONOCYTES # BLD AUTO: 10.1 % (ref 3.8–10.2)
NEUTROPHILS # BLD: 65.4 % (ref 42.8–82.8)
PLATELET # BLD AUTO: 224 K/MM3 (ref 134–434)
PMV BLD: 8.8 FL (ref 7.5–11.1)
POTASSIUM SERPLBLD-SCNC: 4.6 MMOL/L (ref 3.5–5.1)
PROT SERPL-MCNC: 6.7 G/DL (ref 6.4–8.2)
PT PNL PPP: 28.1 SEC (ref 9.98–11.88)
RBC # BLD AUTO: 4.02 M/MM3 (ref 3.6–5.2)
SODIUM SERPL-SCNC: 142 MMOL/L (ref 136–145)
WBC # BLD AUTO: 8.8 K/MM3 (ref 4–10)

## 2017-12-24 RX ADMIN — METRONIDAZOLE SCH MLS/HR: 500 INJECTION, SOLUTION INTRAVENOUS at 17:47

## 2017-12-24 RX ADMIN — SODIUM CHLORIDE SCH: 4.5 INJECTION, SOLUTION INTRAVENOUS at 21:43

## 2017-12-24 RX ADMIN — LEVOFLOXACIN SCH MLS/HR: 5 INJECTION, SOLUTION INTRAVENOUS at 15:37

## 2017-12-24 RX ADMIN — METRONIDAZOLE SCH MLS/HR: 500 INJECTION, SOLUTION INTRAVENOUS at 10:44

## 2017-12-24 RX ADMIN — SOTALOL HYDROCHLORIDE SCH MG: 80 TABLET ORAL at 21:44

## 2017-12-24 RX ADMIN — WARFARIN SCH MG: 2.5 TABLET ORAL at 17:46

## 2017-12-24 RX ADMIN — SODIUM CHLORIDE SCH MLS/HR: 4.5 INJECTION, SOLUTION INTRAVENOUS at 16:57

## 2017-12-24 RX ADMIN — PANTOPRAZOLE SODIUM SCH MG: 40 TABLET, DELAYED RELEASE ORAL at 10:44

## 2017-12-24 RX ADMIN — METRONIDAZOLE SCH MLS/HR: 500 INJECTION, SOLUTION INTRAVENOUS at 02:01

## 2017-12-24 RX ADMIN — SOTALOL HYDROCHLORIDE SCH MG: 80 TABLET ORAL at 10:43

## 2017-12-24 RX ADMIN — VALSARTAN SCH MG: 160 TABLET, FILM COATED ORAL at 10:44

## 2017-12-24 NOTE — PN
Progress Note, Physician


Chief Complaint: 





THIS IS MY FIRST ENCOUNTER WITH THIS PATIENT ON THIS ADMISSION


RECORDS AND NOTES REVIEWED


AWAKE


SON BEDSIDE


C/O BRBPR


NO FEVER


+ ABD PAIN





- Current Medication List


Current Medications: 


Active Medications





Acetaminophen/Codeine Phosphate (Tylenol # 3 -)  1 tab PO Q6H PRN


   PRN Reason: SEVERE PAIN


Diltiazem HCl (Cardizem Cd -)  360 mg PO DAILY [facility]


   Last Admin: 12/24/17 10:43 Dose:  360 mg


Metronidazole (Flagyl 500mg Premixed Ivpb -)  500 mg in 100 mls @ 100 mls/hr 

IVPB Q8H-IV [facility]


   Last Admin: 12/24/17 10:44 Dose:  100 mls/hr


Sodium Chloride (1/2 Normal Saline)  1,000 mls @ 75 mls/hr IV ASDIR [facility]


   Last Admin: 12/23/17 20:11 Dose:  75 mls/hr


Levofloxacin (Levaquin 500 Mg Premixed Ivpb -)  500 mg in 100 mls @ 100 mls/hr 

IVPB DAILY [facility]


Pantoprazole Sodium (Protonix -)  40 mg PO DAILY [facility]


   Last Admin: 12/24/17 10:44 Dose:  40 mg


Sotalol HCl (Betapace -)  80 mg PO BID [facility]


   Last Admin: 12/24/17 10:43 Dose:  80 mg


Valsartan (Diovan -)  160 mg PO DAILY [facility]


   Last Admin: 12/24/17 10:44 Dose:  160 mg


Warfarin Sodium (Coumadin -)  2.5 mg PO [id number]@1800 [facility]


   Last Admin: 12/23/17 19:30 Dose:  2.5 mg


Warfarin Sodium (Coumadin -)  5 mg PO [id number]@1800 [facility]











- Objective


Vital Signs: 


 Vital Signs











Temperature  97.8 F   12/24/17 08:25


 


Pulse Rate  71   12/24/17 08:25


 


Respiratory Rate  18   12/24/17 08:25


 


Blood Pressure  164/79   12/24/17 08:25


 


O2 Sat by Pulse Oximetry (%)  96   12/23/17 23:00











Constitutional: Yes: Mild Distress


Eyes: Yes: WNL


HENT: Yes: WNL


Neck: Yes: WNL


Cardiovascular: Yes: Pulse Irregular


Respiratory: Yes: WNL


Gastrointestinal: Yes: Tenderness, Rebound


Genitourinary: Yes: WNL


Musculoskeletal: Yes: WNL


Extremities: Yes: WNL


Edema: No


Peripheral Pulses WNL: Yes


Integumentary: Yes: WNL


Wound/Incision: Yes: Clean/Dry


Neurological: Yes: WNL


...Motor Strength: WNL


Psychiatric: Yes: WNL


Labs: 


 CBC, BMP





 12/24/17 06:50 





 12/24/17 06:50 





 INR, PTT











INR  2.06  (0.82-1.09)  H  12/23/17  12:21    














Problem List





- Problems


(1) Diverticulitis


Code(s): K57.92 - DVTRCLI OF INTEST, PART UNSP, W/O PERF OR ABSCESS W/O BLEED   





(2) Afib


Code(s): I48.91 - UNSPECIFIED ATRIAL FIBRILLATION   


Qualifiers: 


   Atrial fibrillation type: paroxysmal   Qualified Code(s): I48.0 - Paroxysmal 

atrial fibrillation   





(3) Atrial fibrillation, rapid


Code(s): I48.91 - UNSPECIFIED ATRIAL FIBRILLATION   





(4) Diastolic dysfunction without heart failure


Code(s): I51.9 - HEART DISEASE, UNSPECIFIED   





(5) Hyperlipidemia


Code(s): E78.5 - HYPERLIPIDEMIA, UNSPECIFIED   


Qualifiers: 


   Hyperlipidemia type: pure hypercholesterolemia   Qualified Code(s): E78.00 - 

Pure hypercholesterolemia, unspecified; E78.0 - Pure hypercholesterolemia   





(6) Hypertension


Code(s): I10 - ESSENTIAL (PRIMARY) HYPERTENSION   


Qualifiers: 


   Hypertension type: essential hypertension   Qualified Code(s): I10 - 

Essential (primary) hypertension   





(7) Pacemaker


Code(s): Z95.0 - PRESENCE OF CARDIAC PACEMAKER   





Assessment/Plan





IV LEVAQUIN AND FLAGYL


CHECK ESR/CRP NOW


ON COUMADIN FOR /SINUS TACHY SYNDROME WITH AFIB


IF THE RECTAL BLEED CONTINUES WILL HOLD AC


SX AND GI EVAL PENDING

## 2017-12-24 NOTE — CON.ID
Consult


Consult Specialty:: INFECTIOUS DISEASE





- History of Present Illness


History of Present Illness: 





Asked to evaluate this 77 y.o. female with PMH of CAD s/p CABG, sick sinus 

syndrome s/p PPM, AFIB on coumadin, arthritis, hemorrhoids, and extensive 

diverticulosis admitted for c/o recent epigastric pain, and nausea 2 days ago (

now resolved) and then noticed having small amount of bright blood on toilet 

paper when wiping. Pt states she had mild lower abd pain but no dark stools. 

She denies having any fever/chills, current abd pain, dysuria, cough, shortness 

of breath, chest pain. 





- History Source


History Provided By: Patient


Limitations to Obtaining History: No Limitations





- Past Medical History


Cardio/Vascular: Yes: AFIB, CAD, HTN, Hyperlipdemia


Gastrointestinal: Yes: Diverticulosis, Hemorrhoids.  No: Constipation


...Pregnant: No


Musculoskeletal: Yes: Osteoarthritis (R shoulder/injury)





- Past Surgical History


Past Surgical History: Yes: Colonoscopy, Oopherectomy (right), Permanent 

Pacemaker, Upper Endoscopy





- Alcohol/Substance Use


Hx Alcohol Use: No


History of Substance Use: reports: None





- Smoking History


Smoking history: Former smoker


Have you smoked in the past 12 months: No


Aproximately how many cigarettes per day: 0


If you are a former smoker, when did you quit?: 25 yrs





- Social History


ADL: Independent


History of Recent Travel: No





Home Medications





- Allergies


Allergies/Adverse Reactions: 


 Allergies











Allergy/AdvReac Type Severity Reaction Status Date / Time


 


Penicillins AdvReac Mild Itching Verified 12/23/17 19:10














- Home Medications


Home Medications: 


Ambulatory Orders





Warfarin Sodium [Coumadin] 5 mg PO ASDIR 10/16/14 


Sotalol HCl [Betapace -] 80 mg PO BID #28 tablet 10/19/14 


Valsartan [Diovan] 160 mg PO DAILY #14 tablet 07/24/15 


Warfarin Na [Coumadin -] 2.5 mg PO ASDIR 02/02/16 


Diltiazem Cd [Cardizem Cd -] 360 mg PO DAILY #30 cap.cd.24h 01/05/17 


Pravastatin Sodium [Pravachol (Nf)] 40 mg PO HS 05/10/17 


Acetaminophen W/ Codeine #3 [Tylenol # 3 -] 1 tab PO Q6H PRN #12 tablet MDD 4 

tabs 11/14/17 


Fluticasone Prop 0.05% Nasal [Flonase -] 1 - 2 spray NS DAILY #1 spray.pump 11/ 14/17 


Lidocaine 5% Patch [Lidoderm -] 1 patch TP DAILY #3 patch 11/14/17 











Family Disease History





- Family Disease History


Other Family History: hypertension





Review of Systems





- Review of Systems


Constitutional: reports: No Symptoms


Eyes: reports: No Symptoms


HENT: reports: No Symptoms


Neck: reports: No Symptoms


Cardiovascular: reports: No Symptoms


Respiratory: reports: No Symptoms


Gastrointestinal: reports: Rectal Bleeding


Genitourinary: reports: No Symptoms


Breasts: reports: No Symptoms Reported


Integumentary: reports: No Symptoms


Neurological: reports: No Symptoms


Endocrine: reports: No Symptoms


Hematology/Lymphatic: reports: No Symptoms


Psychiatric: reports: No Symptoms





Physical Exam


Vital Signs: 


 Vital Signs











Temperature  98.4 F   12/24/17 15:00


 


Pulse Rate  60   12/24/17 15:00


 


Respiratory Rate  16   12/24/17 15:00


 


Blood Pressure  139/60   12/24/17 15:00


 


O2 Sat by Pulse Oximetry (%)  96   12/23/17 23:00











Constitutional: Yes: No Distress, Calm


HENT: Yes: Atraumatic


Cardiovascular: Yes: Regular Rate and Rhythm, Other (+PPM)


Respiratory: Yes: CTA Bilaterally


Gastrointestinal: Yes: Normal Bowel Sounds, Soft, Tenderness (minimal LLQ with 

deep palpation)


Renal/: Yes: WNL


Extremities: Yes: WNL


Edema: No


Integumentary: Yes: WNL


Neurological: Yes: Alert, Oriented


Labs: 


 CBC, BMP





 12/24/17 06:50 





 12/24/17 06:50 











Imaging





- Results


Cat Scan: Report Reviewed (CT abd: pan-diverticulosis, possible mild sigmoid 

diverticulitis, no abscess)





Assessment/Plan





77 y.o. female with CAD s/p CABG, Afib on Coumadin, s/p PPM, diverticulosis, 

gastritis, and hemorrhoids admitted for mild amt of BRBPR she noted when wiping 

with mild LLQ abd pain . Had epigastric pain with nausea 2 days ago but 

resolved. CT Scan with evidence of mild diverticulitis.





Sigmoid Diverticulitis


- cont Levaquin/Flagyl IV empirically for now


- case d/w Dr. Miller


- GI to follow


- cont. monitor vitals, pt stable at this time

## 2017-12-24 NOTE — CONSULT
Consult


Consult Specialty:: General Surgery


Referred by:: Dr. Silva


Reason for Consultation:: diverticulitis, rectal bleeding





- History of Present Illness


Chief Complaint: BRBPR with BMs when wiping for several days


History of Present Illness: 





78yo  F with multiple medical problems including afib on coumadin, sick 

sinus syndrome s/p pacemaker, diverticulosis and hemorrhoids, experiences 

intermittent BRBPR on toilet paper after BMs, and has had this multiple times 

in the last 4-5 days. She reports using Preparation H in past for hemorrhoidal 

itching with good effect, but has not done so recently. She had an episode of 

blood with wiping earlier today, but reports are that stool itself was brown. 

She denies abdominal pain at this time or significant recently. No f/c, no n/v, 

no diarrhea, "soft/normal BMs 2-3 times a day." WBC is normal. CT showed 

pandiverticulosis, area in sigmoid with mild inflammatory changes/haziness 

suggestive of possible mild diverticulitis, normal appendix, no obstruction. 

She has been started on Levo/Flagyl, and ID is also consulted. GI was consulted

, has not seen her yet. Last colonoscopy was 3-4 years ago. Surgical history 

includes pacemaker and right oophorectomy. Surgery was asked to evaluate her 

for diverticulitis.





- History Source


History Provided By: Patient, Medical Record


Limitations to Obtaining History: Language Barrier (Hebrew (some 

interpretation by CHRISTIE Cloud at bedside))





- Past Medical History


Cardio/Vascular: Yes: AFIB, CAD, HTN, Hyperlipdemia


Gastrointestinal: Yes: Diverticulosis, GERD, GI Bleed (blood on toilet paper, 

has happened before), Hemorrhoids.  No: Constipation


Reproductive: Yes: Postmenopausal


...Pregnant: No


Musculoskeletal: Yes: Osteoarthritis (R shoulder/injury)





- Past Surgical History


Past Surgical History: Yes: Colonoscopy, Oopherectomy (right), Permanent 

Pacemaker, Upper Endoscopy





- Alcohol/Substance Use


Hx Alcohol Use: No


History of Substance Use: reports: None





- Smoking History


Smoking history: Former smoker


Have you smoked in the past 12 months: No


Aproximately how many cigarettes per day: 0


If you are a former smoker, when did you quit?: 25 yrs





- Social History


ADL: Independent





Home Medications





- Allergies


Allergies/Adverse Reactions: 


 Allergies











Allergy/AdvReac Type Severity Reaction Status Date / Time


 


Penicillins AdvReac Mild Itching Verified 12/23/17 19:10














- Home Medications


Home Medications: 


Ambulatory Orders





Warfarin Sodium [Coumadin] 5 mg PO ASDIR 10/16/14 


Sotalol HCl [Betapace -] 80 mg PO BID #28 tablet 10/19/14 


Valsartan [Diovan] 160 mg PO DAILY #14 tablet 07/24/15 


Warfarin Na [Coumadin -] 2.5 mg PO ASDIR 02/02/16 


Diltiazem Cd [Cardizem Cd -] 360 mg PO DAILY #30 cap.cd.24h 01/05/17 


Pravastatin Sodium [Pravachol (Nf)] 40 mg PO HS 05/10/17 


Acetaminophen W/ Codeine #3 [Tylenol # 3 -] 1 tab PO Q6H PRN #12 tablet MDD 4 

tabs 11/14/17 


Fluticasone Prop 0.05% Nasal [Flonase -] 1 - 2 spray NS DAILY #1 spray.pump 11/ 14/17 


Lidocaine 5% Patch [Lidoderm -] 1 patch TP DAILY #3 patch 11/14/17 











Family Disease History





- Family Disease History


Family History: Unremarkable


Other Family History: hypertension





Review of Systems





- Review of Systems


Constitutional: denies: Chills, Fever


Eyes: denies: Blurred Vision, Recent Change in Vision


HENT: denies: Difficult Swallowing, Throat Pain


Neck: denies: Swollen Glands, Tenderness


Cardiovascular: denies: Chest Pain, Palpitations


Respiratory: denies: Cough, SOB


Gastrointestinal: reports: Bloating, Rectal Bleeding (small amount, mostly on 

paper), Other (hemorrhoids, usually itching responds to Prep H, has not used 

recently).  denies: Abdominal Pain (denies), Constipation, Diarrhea, Nausea, 

Vomiting


Genitourinary: denies: Burning, Dysuria


Musculoskeletal: reports: Extremity Pain (right upper arm, with movement of 

shoulder/elbow, previous injury - had rehab/therapy), Joint Pain (right shoulder

/upper arm)


Integumentary: denies: Change in Color, Rash


Neurological: denies: Dizziness, Headache, Syncope





Physical Exam


Vital Signs: 


 Vital Signs











Temperature  98.4 F   12/24/17 15:00


 


Pulse Rate  60   12/24/17 15:00


 


Respiratory Rate  16   12/24/17 15:00


 


Blood Pressure  139/60   12/24/17 15:00


 


O2 Sat by Pulse Oximetry (%)  96   12/23/17 23:00











Constitutional: Yes: Well Nourished, No Distress, Calm


Eyes: Yes: Conjunctiva Clear, EOM Intact


HENT: Yes: Atraumatic, Normocephalic


Neck: Yes: Supple, Trachea Midline


Cardiovascular: Yes: Pulse Irregular, Murmur.  No: Bradycardia, Tachycardia


Respiratory: Yes: Regular, CTA Bilaterally


Gastrointestinal: Yes: Normal Bowel Sounds, Soft, Abdomen, Obese, Distention (

minimal?), Tenderness (minimal LLQ only, no R/G).  No: Rectal Bleeding


...Rectal Exam: Yes: Sphincter Tone Normal, Other (no gross blood on fingertip/

glove, small bit of soft, yellow-brown stool).  No: Hemorrhoids/External


Renal/: No: CVA Tenderness - Left, CVA Tenderness - Right


Musculoskeletal: No: Back Pain, Joint Swelling


Extremities: No: Cool, Cyanosis


Integumentary: No: Jaundice, Rash


Neurological: Yes: Alert, Oriented


Psychiatric: Yes: Alert, Oriented


Labs: 


 CBC, BMP





 12/24/17 06:50 





 12/24/17 06:50 





 INR, PTT











INR  2.06  (0.82-1.09)  H  12/23/17  12:21    








 CMP











Sodium  142 mmol/L (136-145)   12/24/17  06:50    


 


Potassium  4.6 mmol/L (3.5-5.1)   12/24/17  06:50    


 


Chloride  107 mmol/L ()   12/24/17  06:50    


 


Carbon Dioxide  25 mmol/L (21-32)   12/24/17  06:50    


 


Anion Gap  10  (8-16)   12/24/17  06:50    


 


BUN  10 mg/dL (7-18)  D 12/24/17  06:50    


 


Creatinine  0.7 mg/dL (0.55-1.02)   12/24/17  06:50    


 


Creat Clearance w eGFR  > 60  (>60)   12/24/17  06:50    


 


Random Glucose  97 mg/dL ()   12/24/17  06:50    


 


Calcium  9.1 mg/dL (8.5-10.1)   12/24/17  06:50    


 


Total Bilirubin  0.3 mg/dL (0.2-1.0)   12/24/17  06:50    


 


AST  20 U/L (15-37)  D 12/24/17  06:50    


 


ALT  24 U/L (12-78)   12/24/17  06:50    


 


Alkaline Phosphatase  86 U/L ()   12/24/17  06:50    


 


Creatine Kinase  64 IU/L ()   12/23/17  12:21    


 


Troponin I  < 0.02 ng/ml (0.00-0.05)   12/23/17  12:21    


 


C-Reactive Protein  1.2 MG/DL (0.00-0.3)  H  12/24/17  06:50    


 


Total Protein  6.7 g/dl (6.4-8.2)   12/24/17  06:50    


 


Albumin  3.2 g/dl (3.4-5.0)  L  12/24/17  06:50    


 


Lipase  132 U/L ()   12/23/17  12:21    








H/H stable


INR therapeutic yesterday


got 2.5mg coumadin last night (as at home)








Imaging





- Results


Cat Scan: Report Reviewed (pandiverticulosis, no obstruction, mild area of 

haziness/inflammation in sigmoid, possible element of mild diverticulitis, no 

appendicitis), Image Reviewed





Problem List





- Problems


(1) Diverticulitis large intestine w/o perforation or abscess w/bleeding


Assessment/Plan: 


admitted to medicine


on clear liquids, tolerating po


denies abdominal pain


very mild LLQ tenderness


CT with pandiverticulosis, possible element of mild sigmoid diverticulitis


pt reports no previous history of same


on antibiotics, ID consulted


no acute surgical issues at this time


will follow with you





Thank you for the opportunity to participate in the care of this patient.





intermittent rectal bleeding may be secondary to diverticuli, hemorrhoids or 

both


see below


Code(s): K57.33 - DVTRCLI OF LG INT W/O PERFORATION OR ABSCESS W BLEEDING   





(2) Bleeding per rectum


Assessment/Plan: 


ddx includes diverticular bleeding, hemorrhoidal bleeding, other lower GI source


GI consulted


consider scope to differentiate source


Code(s): K62.5 - HEMORRHAGE OF ANUS AND RECTUM   





(3) Hemorrhoids, internal, with bleeding


Assessment/Plan: 


pt reports h/o internal hemorrhoids and topical effective treatment


unclear if bleeding related to these in past or now


GI consulted


Code(s): K64.8 - OTHER HEMORRHOIDS   





(4) Atrial fibrillation, rapid


Assessment/Plan: 


on coumadin - currently still getting


monitor INR


if additional bleeding episodes, would hold


Code(s): I48.91 - UNSPECIFIED ATRIAL FIBRILLATION   





(5) Coronary artery disease


Code(s): I25.10 - ATHSCL HEART DISEASE OF NATIVE CORONARY ARTERY W/O ANG PCTRS 

  


Qualifiers: 


   Coronary Disease-Associated Artery/Lesion type: native artery   Native vs. 

transplanted heart: native heart   Associated angina: with stable angina   

Qualified Code(s): I25.118 - Atherosclerotic heart disease of native coronary 

artery with other forms of angina pectoris   





(6) Sick sinus syndrome


Code(s): I49.5 - SICK SINUS SYNDROME   





(7) Pacemaker


Code(s): Z95.0 - PRESENCE OF CARDIAC PACEMAKER   





(8) Diastolic dysfunction without heart failure


Code(s): I51.9 - HEART DISEASE, UNSPECIFIED   





(9) Hyperlipidemia


Code(s): E78.5 - HYPERLIPIDEMIA, UNSPECIFIED   


Qualifiers: 


   Hyperlipidemia type: pure hypercholesterolemia   Qualified Code(s): E78.00 - 

Pure hypercholesterolemia, unspecified; E78.0 - Pure hypercholesterolemia   





(10) Hypertension


Code(s): I10 - ESSENTIAL (PRIMARY) HYPERTENSION   


Qualifiers: 


   Hypertension type: essential hypertension   Qualified Code(s): I10 - 

Essential (primary) hypertension

## 2017-12-25 LAB
INR BLD: 2.6 (ref 0.82–1.09)
PT PNL PPP: 29.4 SEC (ref 9.98–11.88)

## 2017-12-25 RX ADMIN — SOTALOL HYDROCHLORIDE SCH MG: 80 TABLET ORAL at 10:24

## 2017-12-25 RX ADMIN — PANTOPRAZOLE SODIUM SCH MG: 40 TABLET, DELAYED RELEASE ORAL at 10:24

## 2017-12-25 RX ADMIN — METRONIDAZOLE SCH MLS/HR: 500 INJECTION, SOLUTION INTRAVENOUS at 17:09

## 2017-12-25 RX ADMIN — ACETAMINOPHEN AND CODEINE PHOSPHATE PRN TAB: 300; 30 TABLET ORAL at 11:33

## 2017-12-25 RX ADMIN — SOTALOL HYDROCHLORIDE SCH MG: 80 TABLET ORAL at 21:51

## 2017-12-25 RX ADMIN — METRONIDAZOLE SCH MLS/HR: 500 INJECTION, SOLUTION INTRAVENOUS at 10:24

## 2017-12-25 RX ADMIN — LEVOFLOXACIN SCH MLS/HR: 5 INJECTION, SOLUTION INTRAVENOUS at 11:32

## 2017-12-25 RX ADMIN — SODIUM CHLORIDE SCH MLS/HR: 4.5 INJECTION, SOLUTION INTRAVENOUS at 21:51

## 2017-12-25 RX ADMIN — METRONIDAZOLE SCH MLS/HR: 500 INJECTION, SOLUTION INTRAVENOUS at 02:50

## 2017-12-25 RX ADMIN — NYSTATIN CREAM SCH APPLIC: 100000 CREAM TOPICAL at 21:52

## 2017-12-25 RX ADMIN — VALSARTAN SCH MG: 160 TABLET, FILM COATED ORAL at 10:24

## 2017-12-25 NOTE — PN
Progress Note, Physician


Chief Complaint: 





AWAKE ALERT


SITTING UP


STILL WITH SOME ABD DISCOMFORT





- Current Medication List


Current Medications: 


Active Medications





Acetaminophen/Codeine Phosphate (Tylenol # 3 -)  1 tab PO Q6H PRN


   PRN Reason: SEVERE PAIN


   Last Admin: 12/25/17 11:33 Dose:  1 tab


Diltiazem HCl (Cardizem Cd -)  360 mg PO DAILY [facility]


   Last Admin: 12/25/17 10:24 Dose:  360 mg


Metronidazole (Flagyl 500mg Premixed Ivpb -)  500 mg in 100 mls @ 100 mls/hr 

IVPB Q8H-IV [facility]


   Last Admin: 12/25/17 17:09 Dose:  100 mls/hr


Sodium Chloride (1/2 Normal Saline)  1,000 mls @ 75 mls/hr IV ASDIR [facility]


   Last Admin: 12/24/17 21:43 Dose:  Not Given


Levofloxacin (Levaquin 500 Mg Premixed Ivpb -)  500 mg in 100 mls @ 100 mls/hr 

IVPB DAILY [facility]


   Last Admin: 12/25/17 11:32 Dose:  100 mls/hr


Pantoprazole Sodium (Protonix -)  40 mg PO DAILY [facility]


   Last Admin: 12/25/17 10:24 Dose:  40 mg


Sotalol HCl (Betapace -)  80 mg PO BID [facility]


   Last Admin: 12/25/17 10:24 Dose:  80 mg


Valsartan (Diovan -)  160 mg PO DAILY [facility]


   Last Admin: 12/25/17 10:24 Dose:  160 mg


Warfarin Sodium (Coumadin -)  2.5 mg PO SuTuThSa@1800 [facility]


   Last Admin: 12/24/17 17:46 Dose:  2.5 mg


Warfarin Sodium (Coumadin -)  5 mg PO MoWeFr@1800 [facility]


   Last Admin: 12/25/17 17:09 Dose:  5 mg











- Objective


Vital Signs: 


 Vital Signs











Temperature  97.7 F   12/25/17 17:27


 


Pulse Rate  65   12/25/17 17:27


 


Respiratory Rate  20   12/25/17 17:27


 


Blood Pressure  154/72   12/25/17 17:27


 


O2 Sat by Pulse Oximetry (%)  98   12/25/17 10:00











Constitutional: Yes: Mild Distress


Eyes: Yes: WNL


HENT: Yes: WNL


Neck: Yes: WNL


Cardiovascular: Yes: Pulse Irregular


Respiratory: Yes: WNL


Gastrointestinal: Yes: Tenderness


Genitourinary: Yes: WNL


Musculoskeletal: Yes: WNL


Extremities: Yes: WNL


Edema: No


Peripheral Pulses WNL: Yes


Integumentary: Yes: WNL


Wound/Incision: Yes: Clean/Dry


Neurological: Yes: WNL


...Motor Strength: WNL


Psychiatric: Yes: WNL


Labs: 


 CBC, BMP





 12/24/17 06:50 





 12/24/17 06:50 





 INR, PTT











INR  2.60  (0.82-1.09)  H  12/25/17  07:00    














Problem List





- Problems


(1) Diverticulitis


Code(s): K57.92 - DVTRCLI OF INTEST, PART UNSP, W/O PERF OR ABSCESS W/O BLEED   





(2) Afib


Code(s): I48.91 - UNSPECIFIED ATRIAL FIBRILLATION   


Qualifiers: 


   Atrial fibrillation type: paroxysmal   Qualified Code(s): I48.0 - Paroxysmal 

atrial fibrillation   





(3) Atrial fibrillation, rapid


Code(s): I48.91 - UNSPECIFIED ATRIAL FIBRILLATION   





(4) Diastolic dysfunction without heart failure


Code(s): I51.9 - HEART DISEASE, UNSPECIFIED   





(5) Hyperlipidemia


Code(s): E78.5 - HYPERLIPIDEMIA, UNSPECIFIED   


Qualifiers: 


   Hyperlipidemia type: pure hypercholesterolemia   Qualified Code(s): E78.00 - 

Pure hypercholesterolemia, unspecified; E78.0 - Pure hypercholesterolemia   





(6) Hypertension


Code(s): I10 - ESSENTIAL (PRIMARY) HYPERTENSION   


Qualifiers: 


   Hypertension type: essential hypertension   Qualified Code(s): I10 - 

Essential (primary) hypertension   





(7) Pacemaker


Code(s): Z95.0 - PRESENCE OF CARDIAC PACEMAKER   





Assessment/Plan





IV LEVAQUIN AND FLAGYL


CHECK ESR/CRP NOW


ON COUMADIN FOR /SINUS TACHY SYNDROME WITH AFIB


IF THE RECTAL BLEED CONTINUES WILL HOLD AC


SX AND GI EVAL PENDING

## 2017-12-25 NOTE — PN
Progress Note, Physician


History of Present Illness: 





Pt with diverticulosis and hemorrhoids with intermittent BRBPR and mild sigmoid 

diverticulitis seen on CT at admission. On antibiotics, has been on clear 

liquids and tolerating. Reports + BMs today, small, but with no blood seen in 

last 24 hours. Hungry for diet. No abdominal pain at this time. No fevers, no 

nausea. Seen by GI tonight.





- Current Medication List


Current Medications: 


Active Medications





Acetaminophen/Codeine Phosphate (Tylenol # 3 -)  1 tab PO Q6H PRN


   PRN Reason: SEVERE PAIN


   Last Admin: 12/25/17 11:33 Dose:  1 tab


Diltiazem HCl (Cardizem Cd -)  360 mg PO DAILY [facility]


   Last Admin: 12/25/17 10:24 Dose:  360 mg


Metronidazole (Flagyl 500mg Premixed Ivpb -)  500 mg in 100 mls @ 100 mls/hr 

IVPB Q8H-IV [facility]


   Last Admin: 12/25/17 17:09 Dose:  100 mls/hr


Sodium Chloride (1/2 Normal Saline)  1,000 mls @ 75 mls/hr IV ASDIR [facility]


   Last Admin: 12/24/17 21:43 Dose:  Not Given


Levofloxacin (Levaquin 500 Mg Premixed Ivpb -)  500 mg in 100 mls @ 100 mls/hr 

IVPB DAILY [facility]


   Last Admin: 12/25/17 11:32 Dose:  100 mls/hr


Nystatin (Mycostatin Cream -)  1 applic TP BID [facility]


Pantoprazole Sodium (Protonix -)  40 mg PO DAILY [facility]


   Last Admin: 12/25/17 10:24 Dose:  40 mg


Sotalol HCl (Betapace -)  80 mg PO BID [facility]


   Last Admin: 12/25/17 10:24 Dose:  80 mg


Valsartan (Diovan -)  160 mg PO DAILY [facility]


   Last Admin: 12/25/17 10:24 Dose:  160 mg


Warfarin Sodium (Coumadin -)  2.5 mg PO SuTuThSa@1800 [facility]


   Last Admin: 12/24/17 17:46 Dose:  2.5 mg


Warfarin Sodium (Coumadin -)  5 mg PO MoWeFr@1800 [facility]


   Last Admin: 12/25/17 17:09 Dose:  5 mg











- Objective


Vital Signs: 


 Vital Signs











Temperature  97.7 F   12/25/17 17:27


 


Pulse Rate  65   12/25/17 17:27


 


Respiratory Rate  20   12/25/17 17:27


 


Blood Pressure  154/72   12/25/17 17:27


 


O2 Sat by Pulse Oximetry (%)  98   12/25/17 10:00











Constitutional: Yes: Well Nourished, No Distress, Calm


Eyes: Yes: Conjunctiva Clear, EOM Intact


HENT: Yes: Atraumatic, Normocephalic


Gastrointestinal: Yes: Soft, Abdomen, Obese.  No: Rectal Bleeding (reported by 

patient), Tenderness (no LLQ tenderness)


...Rectal Exam: Yes: Deferred


Extremities: No: Cool, Cyanosis


Integumentary: No: Jaundice, Rash


Neurological: Yes: Alert, Oriented


Labs: 


 


 INR, PTT











INR  2.60  (0.82-1.09)  H  12/25/17  07:00    














Problem List





- Problems


(1) Diverticulitis large intestine w/o perforation or abscess w/bleeding


Assessment/Plan: 





tolerating clear liquids, hungry


no abdominal pain or tenderness


has had ~2 days of bowel rest on clears


should be ok for low residue diet if no increase in pain with eating


complete antibiotics as per ID


f/u with GI as requested


if bleeding recurs on coumadin, would hold and consider endoscopy sooner


Code(s): K57.33 - DVTRCLI OF LG INT W/O PERFORATION OR ABSCESS W BLEEDING   





(2) Bleeding per rectum


Assessment/Plan: 


ddx includes diverticular bleeding, hemorrhoidal bleeding, other lower GI source


GI plans outpt f/u


Code(s): K62.5 - HEMORRHAGE OF ANUS AND RECTUM   





(3) Hemorrhoids, internal, with bleeding


Assessment/Plan: 


pt reports h/o internal hemorrhoids and topical effective treatment


unclear if bleeding related to these in past or now


Code(s): K64.8 - OTHER HEMORRHOIDS   





(4) Atrial fibrillation, rapid


Assessment/Plan: 


on coumadin


monitor INR


if additional bleeding episodes, would hold


Code(s): I48.91 - UNSPECIFIED ATRIAL FIBRILLATION   





(5) Coronary artery disease


Code(s): I25.10 - ATHSCL HEART DISEASE OF NATIVE CORONARY ARTERY W/O ANG PCTRS 

  


Qualifiers: 


   Coronary Disease-Associated Artery/Lesion type: native artery   Native vs. 

transplanted heart: native heart   Associated angina: with stable angina   

Qualified Code(s): I25.118 - Atherosclerotic heart disease of native coronary 

artery with other forms of angina pectoris   





(6) Sick sinus syndrome


Code(s): I49.5 - SICK SINUS SYNDROME   





(7) Pacemaker


Code(s): Z95.0 - PRESENCE OF CARDIAC PACEMAKER   





(8) Diastolic dysfunction without heart failure


Code(s): I51.9 - HEART DISEASE, UNSPECIFIED   





(9) Hyperlipidemia


Code(s): E78.5 - HYPERLIPIDEMIA, UNSPECIFIED   


Qualifiers: 


   Hyperlipidemia type: pure hypercholesterolemia   Qualified Code(s): E78.00 - 

Pure hypercholesterolemia, unspecified; E78.0 - Pure hypercholesterolemia   





(10) Hypertension


Code(s): I10 - ESSENTIAL (PRIMARY) HYPERTENSION   


Qualifiers: 


   Hypertension type: essential hypertension   Qualified Code(s): I10 - 

Essential (primary) hypertension

## 2017-12-25 NOTE — CON.GI
Consult


Consult Specialty:: gastroenterology


Referred by:: Dr Silva





- History of Present Illness


History of Present Illness: 





78 y/o F with PMH of diverticulosis and colonic polyp was admitted because of 

intermittent rectal bleeding associated with llq pain while on Warfarin. CT was 

done which was negative for neoplasm, diverticulitis and ischemia. The guaiac 

was negative. There was no leukocytosis and anemia. There were no further 

bleeding since admission. Patient has mild LLQ pain and is hungry.





- Past Medical History


Cardio/Vascular: Yes: AFIB, CAD, HTN, Hyperlipdemia


Gastrointestinal: Yes: Diverticulosis, Hemorrhoids.  No: Constipation


...Pregnant: No


Musculoskeletal: Yes: Osteoarthritis (R shoulder/injury)





- Past Surgical History


Past Surgical History: Yes: Colonoscopy, Oopherectomy (right), Permanent 

Pacemaker, Upper Endoscopy





- Alcohol/Substance Use


Hx Alcohol Use: No


History of Substance Use: reports: None





- Smoking History


Smoking history: Former smoker


Have you smoked in the past 12 months: No


Aproximately how many cigarettes per day: 0


If you are a former smoker, when did you quit?: 25 yrs





- Social History


ADL: Independent


History of Recent Travel: No





Home Medications





- Allergies


Allergies/Adverse Reactions: 


 Allergies











Allergy/AdvReac Type Severity Reaction Status Date / Time


 


Penicillins AdvReac Mild Itching Verified 12/23/17 19:10














- Home Medications


Home Medications: 


Ambulatory Orders





Warfarin Sodium [Coumadin] 5 mg PO ASDIR 10/16/14 


Sotalol HCl [Betapace -] 80 mg PO BID #28 tablet 10/19/14 


Valsartan [Diovan] 160 mg PO DAILY #14 tablet 07/24/15 


Warfarin Na [Coumadin -] 2.5 mg PO ASDIR 02/02/16 


Diltiazem Cd [Cardizem Cd -] 360 mg PO DAILY #30 cap.cd.24h 01/05/17 


Pravastatin Sodium [Pravachol (Nf)] 40 mg PO HS 05/10/17 


Acetaminophen W/ Codeine #3 [Tylenol # 3 -] 1 tab PO Q6H PRN #12 tablet MDD 4 

tabs 11/14/17 


Fluticasone Prop 0.05% Nasal [Flonase -] 1 - 2 spray NS DAILY #1 spray.pump 11/ 14/17 


Lidocaine 5% Patch [Lidoderm -] 1 patch TP DAILY #3 patch 11/14/17 











Family Disease History





- Family Disease History


Other Family History: hypertension





Review of Systems





- Review of Systems


Constitutional: denies: No Symptoms, Chills, Diaphoresis, Fever, Lethargy, Loss 

of Appetite, Malaise, Night Sweats, Unintentional Wgt. Loss, Weakness, Other


Eyes: denies: No Symptoms, Blind Spots, Blurred Vision, Double Vision, Eye Pain

, Floaters, Photophobia, Recent Change in Vision, Other


Neck: denies: No Symptoms, Decreased ROM, Lumps, Pain on Movement, Stiffness, 

Swollen Glands, Tenderness, Other


Gastrointestinal: reports: Rectal Bleeding


Genitourinary: denies: No Symptoms, Burning, Discharge, Dysuria, Flank Pain, 

Frequency, Hematuria, Incontinence, Lesions, Menses, Pain, Testicular Mass, 

Testicular Pain, Testicular Swelling, Urgency, Vaginal Bleeding, Other


Breasts: denies: No Symptoms Reported, See HPI, Breast Implants, Discharge from 

Nipple, Lumps, Pain, Skin Changes, Other





Physical Exam-GI


Vital Signs: 


 Vital Signs











Temperature  97.7 F   12/25/17 17:27


 


Pulse Rate  65   12/25/17 17:27


 


Respiratory Rate  20   12/25/17 17:27


 


Blood Pressure  154/72   12/25/17 17:27


 


O2 Sat by Pulse Oximetry (%)  98   12/25/17 10:00











Constitutional: Yes: Well Nourished


Eyes: Yes: Conjunctiva Clear


HENT: Yes: Atraumatic


Neck: Yes: Supple


Cardiovascular: Yes: Pulse Irregular


Respiratory: Yes: CTA Bilaterally


Gastrointestinal Inspection: No: Ascites


...Palpate: Yes: Soft, Tenderness (--mild llq).  No: Firm/Rigid, Guarding, 

Hepatomegaly, Mass, Pulsatile Mass, Splenomegaly


Labs: 


 CBC, BMP





 12/24/17 06:50 





 12/24/17 06:50 





 INR, PTT











INR  2.60  (0.82-1.09)  H  12/25/17  07:00    














Imaging





- Results


Cat Scan: Image Reviewed





Problem List





- Problems


(1) GI bleeding


Assessment/Plan: 


r//o secondary to ischemia and diverticultis





R> advance diet


made aware to follow-up 


repeat colonoscopy in 6 weeks


Code(s): K92.2 - GASTROINTESTINAL HEMORRHAGE, UNSPECIFIED

## 2017-12-25 NOTE — PN
Progress Note, Physician


History of Present Illness: 





patient starting to feels better


no complaints


mild abd pain


says she is feeling hungry





- Current Medication List


Current Medications: 


Active Medications





Acetaminophen/Codeine Phosphate (Tylenol # 3 -)  1 tab PO Q6H PRN


   PRN Reason: SEVERE PAIN


Diltiazem HCl (Cardizem Cd -)  360 mg PO DAILY [facility]


   Last Admin: 12/24/17 10:43 Dose:  360 mg


Metronidazole (Flagyl 500mg Premixed Ivpb -)  500 mg in 100 mls @ 100 mls/hr 

IVPB Q8H-IV [facility]


   Last Admin: 12/25/17 02:50 Dose:  100 mls/hr


Sodium Chloride (1/2 Normal Saline)  1,000 mls @ 75 mls/hr IV ASDIR [facility]


   Last Admin: 12/24/17 21:43 Dose:  Not Given


Levofloxacin (Levaquin 500 Mg Premixed Ivpb -)  500 mg in 100 mls @ 100 mls/hr 

IVPB DAILY [facility]


   Last Admin: 12/24/17 15:37 Dose:  100 mls/hr


Pantoprazole Sodium (Protonix -)  40 mg PO DAILY [facility]


   Last Admin: 12/24/17 10:44 Dose:  40 mg


Sotalol HCl (Betapace -)  80 mg PO BID [facility]


   Last Admin: 12/24/17 21:44 Dose:  80 mg


Valsartan (Diovan -)  160 mg PO DAILY [facility]


   Last Admin: 12/24/17 10:44 Dose:  160 mg


Warfarin Sodium (Coumadin -)  2.5 mg PO SuTuThSa@1800 [facility]


   Last Admin: 12/24/17 17:46 Dose:  2.5 mg


Warfarin Sodium (Coumadin -)  5 mg PO MoWeFr@1800 [facility]











- Objective


Vital Signs: 


 Vital Signs











Temperature  98.2 F   12/25/17 07:43


 


Pulse Rate  61   12/25/17 07:43


 


Respiratory Rate  22   12/25/17 07:43


 


Blood Pressure  141/72   12/25/17 07:43


 


O2 Sat by Pulse Oximetry (%)  96   12/23/17 23:00











Constitutional: Yes: No Distress, Calm


Cardiovascular: Yes: Regular Rate and Rhythm


Respiratory: Yes: Regular, CTA Bilaterally


Gastrointestinal: Yes: Normal Bowel Sounds, Soft


Musculoskeletal: Yes: WNL


Extremities: Yes: WNL


Neurological: Yes: Alert, Oriented


Psychiatric: Yes: Alert, Oriented


Labs: 


 CBC, BMP





 12/24/17 06:50 





 12/24/17 06:50 





 INR, PTT











INR  2.60  (0.82-1.09)  H  12/25/17  07:00    














Assessment/Plan








Problem List





- Problems


(1) Diverticulitis large intestine w/o perforation or abscess w/bleeding


Code(s): K57.33 - DVTRCLI OF LG INT W/O PERFORATION OR ABSCESS W BLEEDING   





(2) Bleeding per rectum


Code(s): K62.5 - HEMORRHAGE OF ANUS AND RECTUM   





(3) Hemorrhoids, internal, with bleeding


Code(s): K64.8 - OTHER HEMORRHOIDS   





(4) Atrial fibrillation, rapid


Code(s): I48.91 - UNSPECIFIED ATRIAL FIBRILLATION   





(5) Coronary artery disease


Code(s): I25.10 - ATHSCL HEART DISEASE OF NATIVE CORONARY ARTERY W/O ANG PCTRS 

  


Qualifiers: 


   Coronary Disease-Associated Artery/Lesion type: native artery   Native vs. 

transplanted heart: native heart   Associated angina: with stable angina   

Qualified Code(s): I25.118 - Atherosclerotic heart disease of native coronary 

artery with other forms of angina pectoris   





(6) Sick sinus syndrome


Code(s): I49.5 - SICK SINUS SYNDROME   





(7) Pacemaker


Code(s): Z95.0 - PRESENCE OF CARDIAC PACEMAKER   





(8) Diastolic dysfunction without heart failure


Code(s): I51.9 - HEART DISEASE, UNSPECIFIED   





(9) Hyperlipidemia


Code(s): E78.5 - HYPERLIPIDEMIA, UNSPECIFIED   


Qualifiers: 


   Hyperlipidemia type: pure hypercholesterolemia   Qualified Code(s): E78.00 - 

Pure hypercholesterolemia, unspecified; E78.0 - Pure hypercholesterolemia   





(10) Hypertension


Code(s): I10 - ESSENTIAL (PRIMARY) HYPERTENSION   


Qualifiers: 


   Hypertension type: essential hypertension   Qualified Code(s): I10 - 

Essential (primary) hypertension   





plan


continue current mgmt


as per surgery and primary team


await for advancement of diet


one patient tolerates will switch to oral

## 2017-12-26 VITALS — DIASTOLIC BLOOD PRESSURE: 62 MMHG | HEART RATE: 62 BPM | TEMPERATURE: 98 F | SYSTOLIC BLOOD PRESSURE: 136 MMHG

## 2017-12-26 LAB
ALBUMIN SERPL-MCNC: 3.1 G/DL (ref 3.4–5)
ALP SERPL-CCNC: 76 U/L (ref 45–117)
ALT SERPL-CCNC: 25 U/L (ref 12–78)
ANION GAP SERPL CALC-SCNC: 8 MMOL/L (ref 8–16)
AST SERPL-CCNC: 35 U/L (ref 15–37)
BILIRUB SERPL-MCNC: 0.4 MG/DL (ref 0.2–1)
BUN SERPL-MCNC: 7 MG/DL (ref 7–18)
CALCIUM SERPL-MCNC: 8.4 MG/DL (ref 8.5–10.1)
CHLORIDE SERPL-SCNC: 107 MMOL/L (ref 98–107)
CO2 SERPL-SCNC: 25 MMOL/L (ref 21–32)
CREAT SERPL-MCNC: 0.8 MG/DL (ref 0.55–1.02)
DEPRECATED RDW RBC AUTO: 15.8 % (ref 11.6–15.6)
ERYTHROCYTE [SEDIMENTATION RATE] IN BLOOD BY WESTERGREN METHOD: 54 MM/HR (ref 0–30)
GLUCOSE SERPL-MCNC: 106 MG/DL (ref 74–106)
HCT VFR BLD CALC: 37 % (ref 32.4–45.2)
HGB BLD-MCNC: 11.8 GM/DL (ref 10.7–15.3)
INR BLD: 3.43 (ref 0.82–1.09)
MAGNESIUM SERPL-MCNC: 2.2 MG/DL (ref 1.8–2.4)
MCH RBC QN AUTO: 28.3 PG (ref 25.7–33.7)
MCHC RBC AUTO-ENTMCNC: 31.7 G/DL (ref 32–36)
MCV RBC: 89.1 FL (ref 80–96)
PLATELET # BLD AUTO: 255 K/MM3 (ref 134–434)
PMV BLD: 8.6 FL (ref 7.5–11.1)
POTASSIUM SERPLBLD-SCNC: 4.2 MMOL/L (ref 3.5–5.1)
PROT SERPL-MCNC: 6.4 G/DL (ref 6.4–8.2)
PT PNL PPP: 38.8 SEC (ref 9.98–11.88)
RBC # BLD AUTO: 4.15 M/MM3 (ref 3.6–5.2)
SODIUM SERPL-SCNC: 140 MMOL/L (ref 136–145)
WBC # BLD AUTO: 8.4 K/MM3 (ref 4–10)

## 2017-12-26 RX ADMIN — METRONIDAZOLE SCH: 500 INJECTION, SOLUTION INTRAVENOUS at 11:51

## 2017-12-26 RX ADMIN — SOTALOL HYDROCHLORIDE SCH MG: 80 TABLET ORAL at 11:35

## 2017-12-26 RX ADMIN — METRONIDAZOLE SCH MLS/HR: 500 INJECTION, SOLUTION INTRAVENOUS at 02:50

## 2017-12-26 RX ADMIN — VALSARTAN SCH MG: 160 TABLET, FILM COATED ORAL at 11:35

## 2017-12-26 RX ADMIN — LEVOFLOXACIN SCH: 5 INJECTION, SOLUTION INTRAVENOUS at 11:51

## 2017-12-26 RX ADMIN — NYSTATIN CREAM SCH APPLIC: 100000 CREAM TOPICAL at 11:36

## 2017-12-26 RX ADMIN — ACETAMINOPHEN AND CODEINE PHOSPHATE PRN TAB: 300; 30 TABLET ORAL at 11:35

## 2017-12-26 RX ADMIN — PANTOPRAZOLE SODIUM SCH MG: 40 TABLET, DELAYED RELEASE ORAL at 11:35

## 2017-12-26 NOTE — DS
Physical Examination


Vital Signs: 


 Vital Signs











Temperature  98.6 F   12/26/17 05:00


 


Pulse Rate  68   12/26/17 05:00


 


Respiratory Rate  18   12/26/17 05:00


 


Blood Pressure  121/63   12/26/17 05:00


 


O2 Sat by Pulse Oximetry (%)  98   12/25/17 22:00











Constitutional: Yes: Well Nourished, No Distress, Calm


Cardiovascular: Yes: Pulse Irregular


Respiratory: Yes: Regular


Gastrointestinal: Yes: Normal Bowel Sounds, Soft


Musculoskeletal: Yes: WNL


Extremities: Yes: WNL


Edema: No


Peripheral Pulses WNL: Yes


Neurological: Yes: Alert, Oriented


Psychiatric: Yes: Alert, Oriented


Labs: 


 CBC, BMP





 12/26/17 06:35 





 12/26/17 06:35 











Discharge Summary


Reason For Visit: DIVERTICULOSIS OF INTESTINE


Current Active Problems





Diverticulitis (Acute)


Diverticulosis (Acute)


Diverticulosis large intestine w/o perforation or abscess w/bleeding (Acute)


Diverticulitis large intestine w/o perforation or abscess w/bleeding (Acute)


Bleeding per rectum (Acute)


Hemorrhoids, internal, with bleeding (Acute)


GI bleeding (Acute)








Hospital Course: 





This is a 77 year old female with pmhx HTN, CAD, sick sinus syndrome s/p ppm, A 

fib (on coumadin) presented to the ED with x2 days of abdominal pain and bright 

red blood per rectum. Pt denies hemaemesis, but reports nausea. Currently, she 

does not have any abdominal pain, stool guaiac is negative, hgb stable. 


In the past she was seen by Dr. Lee had an EGD with polyps removed and 

diverticulosis, d/w Dr. Granda, one polyp was seen as high grade and patient 

will need repeat EGD. 


Condition: Guarded





- Instructions


Diet, Activity, Other Instructions: 


Low residue diet


Hold warfarin today, resume tomorrow 12/27/17 at 2.5 mg daily, 7 days/week


Flagyl 500 mg 3 x day for 5 days


Levaquin 500 mg 1 tab daily for 5 days


Referrals: 


Chung Granda MD [Staff Physician] - 01/02/18


Disposition: HOME





- Home Medications


Comprehensive Discharge Medication List: 


Ambulatory Orders





Warfarin Sodium [Coumadin] 5 mg PO ASDIR 10/16/14 


Sotalol HCl [Betapace -] 80 mg PO BID #28 tablet 10/19/14 


Valsartan [Diovan] 160 mg PO DAILY #14 tablet 07/24/15 


Warfarin Na [Coumadin -] 2.5 mg PO ASDIR 02/02/16 


Diltiazem Cd [Cardizem Cd -] 360 mg PO DAILY #30 cap.cd.24h 01/05/17 


Pravastatin Sodium [Pravachol (Nf)] 40 mg PO HS 05/10/17 


Acetaminophen W/ Codeine #3 [Tylenol # 3 -] 1 tab PO Q6H PRN #12 tablet MDD 4 

tabs 11/14/17 


Fluticasone Prop 0.05% Nasal [Flonase -] 1 - 2 spray NS DAILY #1 spray.pump 11/ 14/17 


Lidocaine 5% Patch [Lidoderm -] 1 patch TP DAILY #3 patch 11/14/17

## 2018-02-21 ENCOUNTER — HOSPITAL ENCOUNTER (EMERGENCY)
Dept: HOSPITAL 74 - JER | Age: 78
Discharge: HOME | End: 2018-02-21
Payer: COMMERCIAL

## 2018-02-21 VITALS — BODY MASS INDEX: 26.6 KG/M2

## 2018-02-21 VITALS — HEART RATE: 71 BPM | SYSTOLIC BLOOD PRESSURE: 158 MMHG | DIASTOLIC BLOOD PRESSURE: 72 MMHG

## 2018-02-21 VITALS — TEMPERATURE: 97.3 F

## 2018-02-21 DIAGNOSIS — Z95.0: ICD-10-CM

## 2018-02-21 DIAGNOSIS — M25.511: Primary | ICD-10-CM

## 2018-02-21 DIAGNOSIS — E78.00: ICD-10-CM

## 2018-02-21 DIAGNOSIS — Z95.1: ICD-10-CM

## 2018-02-21 DIAGNOSIS — G89.29: ICD-10-CM

## 2018-02-21 DIAGNOSIS — I10: ICD-10-CM

## 2018-02-21 DIAGNOSIS — I25.810: ICD-10-CM

## 2018-02-21 NOTE — PDOC
History of Present Illness





- General


History Source: Patient


Exam Limitations: No Limitations





- History of Present Illness


Initial Comments: 





02/21/18 12:14


The patient is a 78 year old female with history of hypertension, hyperlipidemia

, CAD s/p CABG, scheduled for R shoulder arthroscopy in 3/2018 who presents to 

the ED for complaints of right shoulder pain. The patient complains she has 

pain that persists with taking Tylenol and Tramadol prescribed by her 

orthopedist. She complains that she now feels generally weak and has described 

appetite recently secondary to her pain. No fever or chills. 





Orthopedic Surgeon: Dr. George





<Myra Khoury - Last Filed: 02/21/18 12:16>





<Lyle Trinidad - Last Filed: 02/21/18 12:49>





- General


Chief Complaint: Pain


Stated Complaint: RT ARM PAIN


Time Seen by Provider: 02/21/18 11:00





Past History





<Myra Khoury - Last Filed: 02/21/18 12:16>





- Past Medical History


Anemia: No


Asthma: No


Cancer: Yes (SKIN CA- EARLOBE)


Cardiac Disorders: Yes (CORONARY ARTERY DISEASE,Pacemaker)


CVA: No


COPD: No


CHF: No


Dementia: No


Diabetes: No


GI Disorders: Yes (GASTRITIS, diverticlitis)


 Disorders: No


HTN: Yes


Hypercholesterolemia: Yes


Liver Disease:  (FATTY LIVER)


Seizures: No


Thyroid Disease: No





- Surgical History


Abdominal Surgery: No


Appendectomy: No


Cardiac Surgery: Yes (PACEMAKER IMPLANT-[facility]-7/2011)


Cholecystectomy: No


Lung Surgery: No


Neurologic Surgery: No


Orthopedic Surgery: No





- Family Disease History


Family Disease History: Heart Disease: Father





- Immunization History


Immunization Up to Date: Yes





- Suicide/Smoking/Psychosocial Hx


Smoking Status: Yes


Smoking History: Never smoked


Have you smoked in the past 12 months: No


Number of Cigarettes Smoked Daily: 0


If you are a former smoker, when did you quit?: 25 yrs


Information on smoking cessation initiated: No


Hx Alcohol Use: No


Drug/Substance Use Hx: No


Substance Use Type: None


Hx Substance Use Treatment: No





<Lyle Trinidad - Last Filed: 02/21/18 12:49>





- Past Medical History


Allergies/Adverse Reactions: 


 Allergies











Allergy/AdvReac Type Severity Reaction Status Date / Time


 


Penicillins AdvReac Mild Itching Verified 02/21/18 10:27











Home Medications: 


Ambulatory Orders





Sotalol HCl [Betapace -] 80 mg PO BID #28 tablet 10/19/14 


Valsartan [Diovan] 160 mg PO DAILY #14 tablet 07/24/15 


Warfarin Na [Coumadin -] 2.5 mg PO ASDIR 02/02/16 


Diltiazem Cd [Cardizem Cd -] 360 mg PO DAILY #30 cap.cd.24h 01/05/17 


Pravastatin Sodium [Pravachol -] 40 mg PO HS 05/10/17 


Acetaminophen W/ Codeine #3 [Tylenol # 3 -] 1 tab PO Q6H PRN #12 tablet MDD 4 

tabs 11/14/17 


Fluticasone Prop 0.05% Nasal [Flonase -] 1 - 2 spray NS DAILY #1 spray.pump 11/ 14/17 


Lidocaine 5% Patch [Lidoderm -] 1 patch TP DAILY #3 patch 11/14/17 


Pantoprazole Sodium [Protonix -] 40 mg PO DAILY #30 tablet.ec 12/26/17 











**Review of Systems





- Review of Systems


Able to Perform ROS?: Yes


Comments:: 





02/21/18 12:19


CONSTITUTIONAL: No fever, no chills, no fatigue. 


EYES: No visual changes


ENT: No ear pain, no sore throat


CARDIOVASCULAR: No chest pain, no palpitations


RESPIRATORY: No cough, no SOB


GI: No abdominal pain, no nausea, no vomiting, no constipation, no diarrhea


GENITOURINARY: No dysuria, no frequency, no hematuria


MUSCULOSKELETAL: R shoulder pain. No backpain, no myalgias


SKIN: No rash


NEURO: No headache





<Myra Khoury - Last Filed: 02/21/18 12:16>





*Physical Exam





- Vital Signs


 Last Vital Signs











Temp Pulse Resp BP Pulse Ox


 


 97.3 F L  68   18   164/71   99 


 


 02/21/18 10:23  02/21/18 10:23  02/21/18 10:23  02/21/18 10:23  02/21/18 10:23














- Physical Exam


Comments: 





02/21/18 12:19


CONSTITUTIONAL: Well-appearing; well-nourished; in no apparent distress


HEAD: Normocephalic; atraumatic


EYES: PERRL; EOM intact


ENMT: External appears normal; normal oropharynx


NECK: Supple; non-tender; no cervical lymphadenopathy


CARD: Normal S1, S2; no murmurs, rubs, or gallops


RESP: Normal chest excursion with respiration; breath sounds clear and equal 

bilaterally; no wheezes, rhonchi, or rales


ABD: Soft, non-distended; non-tender; no palpable organomegaly, no palpable 

hernias


SKIN: Warm, dry, no rash


NEURO: No focal neurological deficiencies.








<Myra Khoury - Last Filed: 02/21/18 12:16>





- Vital Signs


 Last Vital Signs











Temp Pulse Resp BP Pulse Ox


 


 97.3 F L  68   18   164/71   99 


 


 02/21/18 10:23  02/21/18 10:23  02/21/18 10:23  02/21/18 10:23  02/21/18 10:23














- Physical Exam


Comments: 








02/21/18 12:45


EXTR:  No obvious deformity; + mild tenderness on palpation of anterior and 

lateral aspects of the right shoulder joint, with pain on external/internal 

rotation, abduction, flexion, extension. Neurovascularly intact distally.





<Lyle Trinidad - Last Filed: 02/21/18 12:49>





Medical Decision Making





- Medical Decision Making





02/21/18 12:46


Patient is a well-appearing 78-year-old female with history of chronic right 

shoulder derangement syndrome who presents to the ER with severe pain, and 

weakness. Pain is not effectively controlled by by mouth acetaminophen and 

tramadol. Weakness is associated with worsening shoulder pain and is not 

associated with fevers/chills/nausea/vomiting/chest pain/abdominal pain/dysuria/

hematuria. No acute issues are present. I advised the patient that more 

powerful pain medication would involve narcotics which may be deleterious to 78-

year-old patient. Will provide sling for temporary relief only. Patient advised 

to mobilize the extremity is much as possible. Will discharge with outpatient 

follow-up.





<Lyle Trinidad - Last Filed: 02/21/18 12:49>





*DC/Admit/Observation/Transfer





- Attestations


Scribe Attestion: 





02/21/18 12:20





Documentation prepared by Myra Khoury, acting as medical scribe for Lyle Trinidad MD.





<Myra Khoury - Last Filed: 02/21/18 12:16>





- Attestations


Physician Attestion: 





02/21/18 12:39


The documentation was prepared by the scribe under my direct supervision. I 

have reviewed the documentation which correctly represents the findings, 

medical decision-making and critical action taken by me.





<Lyle Trinidad - Last Filed: 02/21/18 12:49>


Diagnosis at time of Disposition: 


Shoulder pain, right


Qualifiers:


 Chronicity: chronic Qualified Code(s): M25.511 - Pain in right shoulder; 

G89.29 - Other chronic pain; G89.29 - Other chronic pain








- Discharge Dispostion


Disposition: HOME


Condition at time of disposition: Stable





- Referrals


Referrals: 


Misael George MD [Staff Physician] - 





- Patient Instructions


Printed Discharge Instructions:  DI for Shoulder Pain


Print Language: Belgian

## 2018-03-13 ENCOUNTER — HOSPITAL ENCOUNTER (INPATIENT)
Dept: HOSPITAL 74 - JER | Age: 78
LOS: 1 days | Discharge: HOME | DRG: 641 | End: 2018-03-14
Attending: FAMILY MEDICINE | Admitting: FAMILY MEDICINE
Payer: COMMERCIAL

## 2018-03-13 VITALS — BODY MASS INDEX: 24.7 KG/M2

## 2018-03-13 DIAGNOSIS — I48.91: ICD-10-CM

## 2018-03-13 DIAGNOSIS — R63.0: ICD-10-CM

## 2018-03-13 DIAGNOSIS — R31.9: ICD-10-CM

## 2018-03-13 DIAGNOSIS — M25.511: ICD-10-CM

## 2018-03-13 DIAGNOSIS — I34.2: ICD-10-CM

## 2018-03-13 DIAGNOSIS — R62.7: Primary | ICD-10-CM

## 2018-03-13 DIAGNOSIS — Z79.01: ICD-10-CM

## 2018-03-13 DIAGNOSIS — I44.7: ICD-10-CM

## 2018-03-13 DIAGNOSIS — Z88.0: ICD-10-CM

## 2018-03-13 DIAGNOSIS — M81.0: ICD-10-CM

## 2018-03-13 DIAGNOSIS — R59.0: ICD-10-CM

## 2018-03-13 DIAGNOSIS — Z95.0: ICD-10-CM

## 2018-03-13 DIAGNOSIS — I25.10: ICD-10-CM

## 2018-03-13 DIAGNOSIS — R63.4: ICD-10-CM

## 2018-03-13 DIAGNOSIS — D72.829: ICD-10-CM

## 2018-03-13 DIAGNOSIS — M54.9: ICD-10-CM

## 2018-03-13 DIAGNOSIS — Z95.1: ICD-10-CM

## 2018-03-13 DIAGNOSIS — E78.5: ICD-10-CM

## 2018-03-13 LAB
ALBUMIN SERPL-MCNC: 3.7 G/DL (ref 3.4–5)
ALP SERPL-CCNC: 83 U/L (ref 45–117)
ALT SERPL-CCNC: 15 U/L (ref 12–78)
AMYLASE SERPL-CCNC: 31 U/L (ref 25–115)
ANION GAP SERPL CALC-SCNC: 9 MMOL/L (ref 8–16)
APPEARANCE UR: CLEAR
AST SERPL-CCNC: 25 U/L (ref 15–37)
BASOPHILS # BLD: 1.1 % (ref 0–2)
BILIRUB SERPL-MCNC: 0.3 MG/DL (ref 0.2–1)
BILIRUB UR STRIP.AUTO-MCNC: NEGATIVE MG/DL
BNP SERPL-MCNC: 390.54 PG/ML (ref 5–450)
BUN SERPL-MCNC: 19 MG/DL (ref 7–18)
CALCIUM SERPL-MCNC: 9.6 MG/DL (ref 8.5–10.1)
CHLORIDE SERPL-SCNC: 103 MMOL/L (ref 98–107)
CO2 SERPL-SCNC: 27 MMOL/L (ref 21–32)
COLOR UR: (no result)
CREAT SERPL-MCNC: 0.8 MG/DL (ref 0.55–1.02)
DEPRECATED RDW RBC AUTO: 15.8 % (ref 11.6–15.6)
EOSINOPHIL # BLD: 1.2 % (ref 0–4.5)
GLUCOSE SERPL-MCNC: 98 MG/DL (ref 74–106)
HCT VFR BLD CALC: 37.7 % (ref 32.4–45.2)
HGB BLD-MCNC: 12.2 GM/DL (ref 10.7–15.3)
INR BLD: 1.05 (ref 0.82–1.09)
KETONES UR QL STRIP: NEGATIVE
LEUKOCYTE ESTERASE UR QL STRIP.AUTO: NEGATIVE
LIPASE SERPL-CCNC: 134 U/L (ref 73–393)
LYMPHOCYTES # BLD: 16.1 % (ref 8–40)
MCH RBC QN AUTO: 28.6 PG (ref 25.7–33.7)
MCHC RBC AUTO-ENTMCNC: 32.3 G/DL (ref 32–36)
MCV RBC: 88.5 FL (ref 80–96)
MONOCYTES # BLD AUTO: 10.5 % (ref 3.8–10.2)
MUCOUS THREADS URNS QL MICRO: (no result)
NEUTROPHILS # BLD: 71.1 % (ref 42.8–82.8)
NITRITE UR QL STRIP: NEGATIVE
PH UR: 6 [PH] (ref 5–8)
PLATELET # BLD AUTO: 271 K/MM3 (ref 134–434)
PMV BLD: 9.1 FL (ref 7.5–11.1)
POTASSIUM SERPLBLD-SCNC: 4.9 MMOL/L (ref 3.5–5.1)
PROT SERPL-MCNC: 8 G/DL (ref 6.4–8.2)
PROT UR QL STRIP: NEGATIVE
PROT UR QL STRIP: NEGATIVE
PT PNL PPP: 11.9 SEC (ref 9.98–11.88)
RBC # BLD AUTO: 4.27 M/MM3 (ref 3.6–5.2)
RBC # UR STRIP: (no result) /UL
SODIUM SERPL-SCNC: 139 MMOL/L (ref 136–145)
SP GR UR: 1.01 (ref 1–1.03)
UROBILINOGEN UR STRIP-MCNC: NEGATIVE MG/DL (ref 0.2–1)
WBC # BLD AUTO: 12.2 K/MM3 (ref 4–10)

## 2018-03-13 RX ADMIN — APIXABAN SCH MG: 5 TABLET, FILM COATED ORAL at 22:48

## 2018-03-13 RX ADMIN — SOTALOL HYDROCHLORIDE SCH MG: 80 TABLET ORAL at 22:45

## 2018-03-13 NOTE — PDOC
Rapid Medical Evaluation


Time Seen by Provider: 03/13/18 14:23


Medical Evaluation: 


 Allergies











Allergy/AdvReac Type Severity Reaction Status Date / Time


 


Penicillins AdvReac Mild Itching Verified 03/13/18 14:23











03/13/18 14:25


78 year old female with Afib (on Eliquis, holding since Sunday 3pm for 

anticipated shoulder impingement surgery tomorrow), HLD, chronic back pain, 

moderate MV stenosis, osteoporosis, and mediastinal lymphadenopathy sent by her 

PCP for dizziness, fatigue, and poor appetite. Her accompanying paperwork 

states that she should be admitted to Dr. Luna.





-EKG


-Labs including CBC, CMP, cardiac profile, PT/INR


-UA/culture


-CXR


-To Main ED for further evaluation.





**Discharge Disposition





- Referrals


Referrals: 


Ian Garcia [Primary Care Provider] - 





- Patient Instructions





- Post Discharge Activity

## 2018-03-13 NOTE — PDOC
History of Present Illness





- General


Chief Complaint: Lightheaded


Stated Complaint: PCP REFERRED TO ADMIT


Time Seen by Provider: 03/13/18 14:23


History Source: Patient


Exam Limitations: No Limitations





- History of Present Illness


Initial Comments: 


This is a 78 YOF with h/o Afib (on Eliquis), hemorrhoids with rectal bleeding, 

HLD, chronic back pain, moderate MV stenosis, osteoporosis, and mediastinal 

lymphadenopathy sent by her PCP Mukund Kang because for the past month she 

has been having worsening decreased PO intake, fatigue, lightheadedness, and 

generalized weakness, as well as a 10 lb weight loss. The patient has chronic 

right shoulder pain and had been scheduled to have surgery with Dr. George 

tomorrow. She stopped her Eliquis three days ago in preparation for this, but 

saw Dr. Kang this morning for her fatigue and he instructed her to come into 

the ED for admission. The patient herself states that her decreased PO intake 

is d/t shoulder pain. She notes that her last colonoscopy was 2 years ago with 

Dr. Pennington.





Past History





- Past Medical History


Allergies/Adverse Reactions: 


 Allergies











Allergy/AdvReac Type Severity Reaction Status Date / Time


 


Penicillins AdvReac Mild Itching Verified 03/13/18 14:23











Home Medications: 


Ambulatory Orders





Sotalol HCl [Betapace -] 80 mg PO BID #28 tablet 10/19/14 


Valsartan [Diovan] 160 mg PO DAILY #14 tablet 07/24/15 


Warfarin Na [Coumadin -] 2.5 mg PO ASDIR 02/02/16 


Diltiazem Cd [Cardizem Cd -] 360 mg PO DAILY #30 cap.cd.24h 01/05/17 


Pravastatin Sodium [Pravachol -] 40 mg PO HS 05/10/17 


Acetaminophen W/ Codeine #3 [Tylenol # 3 -] 1 tab PO Q6H PRN #12 tablet MDD 4 

tabs 11/14/17 


Fluticasone Prop 0.05% Nasal [Flonase -] 1 - 2 spray NS DAILY #1 spray.pump 11/ 14/17 


Lidocaine 5% Patch [Lidoderm -] 1 patch TP DAILY #3 patch 11/14/17 


Pantoprazole Sodium [Protonix -] 40 mg PO DAILY #30 tablet.ec 12/26/17 








Anemia: No


Asthma: No


Cancer: Yes (SKIN CA- EARLOBE)


Cardiac Disorders: Yes (CORONARY ARTERY DISEASE,Pacemaker)


CVA: No


COPD: No


CHF: No


Dementia: No


Diabetes: No


GI Disorders: Yes (GASTRITIS, diverticlitis)


 Disorders: No


HTN: Yes


Hypercholesterolemia: Yes


Liver Disease:  (FATTY LIVER)


Seizures: No


Thyroid Disease: No





- Surgical History


Abdominal Surgery: No


Appendectomy: No


Cardiac Surgery: Yes (PACEMAKER IMPLANT-[facility]-7/2011)


Cholecystectomy: No


Lung Surgery: No


Neurologic Surgery: No


Orthopedic Surgery: No





- Family Disease History


Family Disease History: Heart Disease: Father





- Immunization History


Immunization Up to Date: Yes





- Suicide/Smoking/Psychosocial Hx


Smoking Status: Yes


Smoking History: Never smoked


Have you smoked in the past 12 months: No


Number of Cigarettes Smoked Daily: 0


If you are a former smoker, when did you quit?: 25 yrs


Hx Alcohol Use: No


Drug/Substance Use Hx: No


Substance Use Type: None


Hx Substance Use Treatment: No





**Review of Systems





- Review of Systems


Able to Perform ROS?: Yes


Constitutional: Yes: Loss of Appetite, Weakness.  No: Chills, Fever, Night 

Sweats, Unexplained wgt Loss


HEENTM: No: Nose Congestion, Throat Pain


Respiratory: No: Cough, Shortness of Breath


Cardiac (ROS): Yes: Lightheadedness.  No: Chest Pain, Palpitations


ABD/GI: Yes: Poor Appetite.  No: Constipated, Diarrhea, Nausea, Vomiting


: No: Burning, Dysuria


Musculoskeletal: No: Back Pain, Neck Pain


Integumentary: No: Bruising, Rash


Neurological: No: Headache, Numbness, Tingling, Weakness


Endocrine: No: Unexplained Weight Gain, Unexplained Weight Loss





*Physical Exam





- Vital Signs


 Last Vital Signs











Temp Pulse Resp BP Pulse Ox


 


 97.8 F   60   20   118/80   98 


 


 03/13/18 14:23  03/13/18 14:23  03/13/18 14:23  03/13/18 14:23  03/13/18 14:23














- Physical Exam


General Appearance: Yes: Nourished, Appropriately Dressed, Other (very pleasant 

Ecuadorean-speaking female in no distress and answering questions appropriately).  

No: Apparent Distress


HEENT: positive: EOMI, DEWEY, Normal Voice, Hearing Grossly Normal.  negative: 

Scleral Icterus (R), Scleral Icterus (L), Nasal Congestion


Neck: positive: Trachea midline, Supple, Other (no Virchow's node).  negative: 

Tender, Rigid, Lymphadenopathy (R), Lymphadenopathy (L)


Respiratory/Chest: positive: Lungs Clear, Normal Breath Sounds.  negative: 

Respiratory Distress, Crackles, Rhonchi, Stridor, Wheezing


Cardiovascular: positive: Regular Rhythm, Regular Rate, Other (2/3 systolic 

ejection murmur).  negative: Edema, JVD


Gastrointestinal/Abdominal: positive: Normal Bowel Sounds, Soft.  negative: 

Tender, Organomegaly, Pulsatile Mass, Guarding


Musculoskeletal: positive: Normal Inspection.  negative: Decreased Range of 

Motion, Vertebral Tenderness


Extremity: positive: Normal Capillary Refill, Normal Inspection, Normal Range 

of Motion.  negative: Tender, Cyanosis


Integumentary: positive: Normal Color, Dry, Warm, Other (left anterior axillary 

line scar from prior surgery).  negative: Erythema, Rash, Bruising


Neurologic: positive: CNs II-XII NML intact, Fully Oriented, Alert, Normal Mood/

Affect, Normal Response, Motor Strength 5/5, Finger to Nose (normal).  negative

: EOM Palsy, Facial Droop, Numbness, Sensory Deficit, Confused, Disoriented





ED Treatment Course





- LABORATORY


CBC & Chemistry Diagram: 


 03/13/18 15:00





 03/13/18 15:00





Medical Decision Making





- Medical Decision Making


78 YOF with Afib (on Eliquis), HLD, chronic back pain, moderate MV stenosis, 

osteoporosis, and mediastinal lymphadenopathy.


Presents from Dr. Kang's office for admission to Dr. Luna for failure to 

thrive, weight loss, decreased PO intake.


On exam the patient's VS wnl, NAD, alert and oriented and interactive, nonfocal 

neuro exam, 2/6 systolic ejection murmur.


Otherwise exam is wnl.


DDX IBNLT UTI, PNA, hypothyroidism, CVA, malignancy, anemia, electrolyte 

derangement, med reaction, arrhythmia, ACS, PMR, etc.





WBC is 12.2, UA with 1+ blood.


EKG shows atrial paced rhythm with prolonged WI interval and LBBB and QTc 516, 

unchanged from priors.








*DC/Admit/Observation/Transfer


Diagnosis at time of Disposition: 


 Hematuria, Leukocytosis





Failure to thrive


Qualifiers:


 Failure to thrive age range: in adult Qualified Code(s): R62.7 - Adult failure 

to thrive





Shoulder pain


Qualifiers:


 Chronicity: chronic Laterality: right Qualified Code(s): M25.511 - Pain in 

right shoulder








- Discharge Dispostion


Condition at time of disposition: Guarded


Admit: Yes





- Referrals


Referrals: 


Ian Garcia [Primary Care Provider] - 





- Patient Instructions





- Post Discharge Activity

## 2018-03-13 NOTE — PDOC
Attending Attestation





- HPI


HPI: 





03/13/18 16:21


The patient is a 78-year-old female with a significant past medical history of 

hypertension, hyperlipidemia, CAD s/p CABG, a-fib (on Eliquis), moderate mitral 

valve stenosis, mediastinal lymphadenopathy, diverticulitis, and chronic back 

pain, who presents to the emergency department with generalized weakness, 

dizziness, and weight loss for a few months. The patient reports associated 

fatigue and decreased intake of food. As per son, the patient has not been 

getting out of bed as often.The patient was scheduled for right shoulder 

surgery with Dr. George tomorrow and stopped taking Eliquis for 3 days, but 

was sent to the ED by PCP Dr. Kang over concern of her fatigue. 


 


The patient denies chest pain, shortness of breath, and headache. The patient 

denies fever, chills, nausea, vomit, diarrhea and constipation. The patient 

denies dysuria, frequency, urgency and hematuria.











- Physicial Exam


PE: 





03/13/18 16:49


Vitals: Triage Vital signs reviewed


General Appearance:  no acute distress, well nourished well developed,


Head: Atraumatic, normocephalic


Eyes: Pupils equal reactive round, extraocular movement intact


Cardiac: Regular rate and rhythm, no murmurs, no rubs, no gallops,


Lungs: Clear to auscultation bilateral, good air movement bilaterally,


Abdomen:  Soft, nondistended, normal bowel sounds, nontender to palpation


Extremities: Moving all extremities, no cyanosis, clubbing, or edema. (+) 

Chronic right shoulder pain.


Skin:  Warm and dry, no rashes or lesions, no petechiae


Neuro: AOX3; Cranial Nerves 2-12 grossly intact, Strength intact to all 

extremities, Sensation intact to all extremities


Psych:  normal mood, normal affect








<Tammie Katz - Last Filed: 03/13/18 16:56>





- Resident


Resident Name: Cindy Espinoza





- ED Attending Attestation


I have performed the following: I have examined & evaluated the patient, The 

case was reviewed & discussed with the resident, I agree w/resident's findings 

& plan, Exceptions are as noted





- Medical Decision Making





03/13/18 18:31





78 years old past medical history significant for hypertension hyperlipidemia 

CAD status post CABG A. fib on eliquis, moderate mitral valve stenosis 

mediastinal lymphadenopathy, diverticulitis chronic back pain presents to the 

ED with generalized weakness dizziness weight loss for a few months sent into 

the emergency department for admission per her primary care provider here in 

the emergency department nonfocal neurologic examination





We'll check labs EKG and imaging




















<Cortez Roberts - Last Filed: 03/13/18 18:34>





**Heart Score/ECG Review





- ECG Impressions


Comment:: 





03/13/18 18:32





Atrial paced rhythm with prolonged AV conduction left axis deviation left 

bundle branch block rate is 60 bpm





Unchanged from previous





Interpreted by me








<Cortez Roberts - Last Filed: 03/13/18 18:34>

## 2018-03-13 NOTE — CON.GI
Consult


Consult Specialty:: GI


Referred by:: Dr Radha Kang, Dr Luna


Reason for Consultation:: weight loss





- History of Present Illness


History of Present Illness: 





77 y/o female with PMh of diverticulosis, last colonoscopy was in 2015, 

history of rectal bleeding 12/2017 was admiited because o right shoulder pain . 

She was noted to have poor appetite and lost 10lbs. She has failed to follow-up 

for outpatient colonoscopy. SHe denies any gi  symptoms including dysphagia, 

abdominal pain, nausea and vomiting. No further rectal bleeding since December 2017.





- Past Medical History


Cardio/Vascular: Yes: AFIB, CAD, HTN, Hyperlipdemia


Gastrointestinal: Yes: Diverticulosis, Hemorrhoids.  No: Constipation


Musculoskeletal: Yes: Osteoarthritis (R shoulder/injury)





- Past Surgical History


Past Surgical History: Yes: Colonoscopy, Oopherectomy (right), Permanent 

Pacemaker, Upper Endoscopy





- Alcohol/Substance Use


Hx Alcohol Use: No


History of Substance Use: reports: None





- Smoking History


Smoking history: Never smoked


Have you smoked in the past 12 months: No


Aproximately how many cigarettes per day: 0


If you are a former smoker, when did you quit?: 25 yrs





- Social History


ADL: Independent


History of Recent Travel: No





<Chung Granda - Last Filed: 03/13/18 18:46>





Home Medications





<Chung Granda - Last Filed: 03/13/18 18:46>





<Yue Lisa - Last Filed: 03/14/18 09:17>





- Allergies


Allergies/Adverse Reactions: 


 Allergies











Allergy/AdvReac Type Severity Reaction Status Date / Time


 


Penicillins AdvReac Mild Itching Verified 03/13/18 14:23














- Home Medications


Home Medications: 


Ambulatory Orders





Sotalol HCl [Betapace -] 80 mg PO BID #28 tablet 10/19/14 


Valsartan [Diovan] 160 mg PO DAILY #14 tablet 07/24/15 


Warfarin Na [Coumadin -] 2.5 mg PO ASDIR 02/02/16 


Diltiazem Cd [Cardizem Cd -] 360 mg PO DAILY #30 cap.cd.24h 01/05/17 


Pravastatin Sodium [Pravachol -] 40 mg PO HS 05/10/17 


Acetaminophen W/ Codeine #3 [Tylenol # 3 -] 1 tab PO Q6H PRN #12 tablet MDD 4 

tabs 11/14/17 


Fluticasone Prop 0.05% Nasal [Flonase -] 1 - 2 spray NS DAILY #1 spray.pump 11/ 14/17 


Lidocaine 5% Patch [Lidoderm -] 1 patch TP DAILY #3 patch 11/14/17 


Pantoprazole Sodium [Protonix -] 40 mg PO DAILY #30 tablet.ec 12/26/17 











Physical Exam-GI


Vital Signs: 


 Vital Signs











Temperature  98.4 F   03/13/18 17:40


 


Pulse Rate  60   03/13/18 17:40


 


Respiratory Rate  20   03/13/18 17:40


 


Blood Pressure  143/62   03/13/18 17:40


 


O2 Sat by Pulse Oximetry (%)  98   03/13/18 17:40











Constitutional: Yes: Well Nourished, Poor Hygeine


HENT: Yes: Atraumatic


Neck: Yes: Supple


Cardiovascular: Yes: Regular Rate and Rhythm


Respiratory: Yes: CTA Bilaterally


...Palpate: Yes: Soft.  No: Firm/Rigid, Guarding, Hepatomegaly, Mass, Pulsatile 

Mass, Splenomegaly, Tenderness


Labs: 


 CBC, BMP





 03/13/18 15:00 





 03/13/18 15:00 





 INR, PTT











INR  1.05  (0.82-1.09)  D 03/13/18  15:00    














<Chung Granda - Last Filed: 03/13/18 18:46>


Vital Signs: 


 Vital Signs











Temperature  98.5 F   03/14/18 05:47


 


Pulse Rate  70   03/14/18 05:47


 


Respiratory Rate  18   03/14/18 05:47


 


Blood Pressure  129/69   03/14/18 05:47


 


O2 Sat by Pulse Oximetry (%)  97   03/13/18 21:00











Labs: 


 CBC, BMP





 03/14/18 07:06 





 INR, PTT











INR  1.05  (0.82-1.09)  D 03/13/18  15:00    














<Yue Lisa - Last Filed: 03/14/18 09:17>





Problem List





- Problems


(1) Poor appetite


Assessment/Plan: 


etiology unclear including possible early Alzheimers, oocult mallignancy


R> Megestrol 40mg daily


 vit B complex


 folic acid


cea, [id number]


 gi w/u including colonoscopy and EGD as an outpatient


Code(s): R63.0 - ANOREXIA   





<Chung Granda - Last Filed: 03/13/18 18:46>

## 2018-03-14 VITALS — SYSTOLIC BLOOD PRESSURE: 129 MMHG | HEART RATE: 61 BPM | DIASTOLIC BLOOD PRESSURE: 64 MMHG | TEMPERATURE: 98.7 F

## 2018-03-14 LAB
ALBUMIN SERPL-MCNC: 3.3 G/DL (ref 3.4–5)
ALP SERPL-CCNC: 69 U/L (ref 45–117)
ALT SERPL-CCNC: 12 U/L (ref 12–78)
ANION GAP SERPL CALC-SCNC: 10 MMOL/L (ref 8–16)
AST SERPL-CCNC: 13 U/L (ref 15–37)
BASOPHILS # BLD: 0.7 % (ref 0–2)
BILIRUB SERPL-MCNC: 0.3 MG/DL (ref 0.2–1)
BUN SERPL-MCNC: 15 MG/DL (ref 7–18)
CALCIUM SERPL-MCNC: 8.1 MG/DL (ref 8.5–10.1)
CHLORIDE SERPL-SCNC: 105 MMOL/L (ref 98–107)
CO2 SERPL-SCNC: 25 MMOL/L (ref 21–32)
CREAT SERPL-MCNC: 0.6 MG/DL (ref 0.55–1.02)
DEPRECATED RDW RBC AUTO: 15.9 % (ref 11.6–15.6)
EOSINOPHIL # BLD: 1.5 % (ref 0–4.5)
GLUCOSE SERPL-MCNC: 94 MG/DL (ref 74–106)
HCT VFR BLD CALC: 33.7 % (ref 32.4–45.2)
HGB BLD-MCNC: 11.1 GM/DL (ref 10.7–15.3)
LYMPHOCYTES # BLD: 24 % (ref 8–40)
MAGNESIUM SERPL-MCNC: 2 MG/DL (ref 1.8–2.4)
MCH RBC QN AUTO: 29.2 PG (ref 25.7–33.7)
MCHC RBC AUTO-ENTMCNC: 32.9 G/DL (ref 32–36)
MCV RBC: 88.6 FL (ref 80–96)
MONOCYTES # BLD AUTO: 9.2 % (ref 3.8–10.2)
NEUTROPHILS # BLD: 64.6 % (ref 42.8–82.8)
PHOSPHATE SERPL-MCNC: 3.8 MG/DL (ref 2.5–4.9)
PLATELET # BLD AUTO: 216 K/MM3 (ref 134–434)
PMV BLD: 9.1 FL (ref 7.5–11.1)
POTASSIUM SERPLBLD-SCNC: 4.2 MMOL/L (ref 3.5–5.1)
PROT SERPL-MCNC: 6.5 G/DL (ref 6.4–8.2)
RBC # BLD AUTO: 3.8 M/MM3 (ref 3.6–5.2)
SODIUM SERPL-SCNC: 140 MMOL/L (ref 136–145)
WBC # BLD AUTO: 8 K/MM3 (ref 4–10)

## 2018-03-14 RX ADMIN — APIXABAN SCH MG: 5 TABLET, FILM COATED ORAL at 10:05

## 2018-03-14 RX ADMIN — SOTALOL HYDROCHLORIDE SCH MG: 80 TABLET ORAL at 10:05

## 2018-03-14 NOTE — CONSULT
Consult - text type





- Consultation


Consultation Note: 





Patient initially scheduled for DR George for surgery on her right shoulder 

today.  The surgery was cancelled by PMD secondary to medical concerns.  

Currently she has no acute orthopedic issues.  We will reschedule her surgery 

once medically optimized.  DC to home when medically ok

## 2018-03-14 NOTE — DS
Physical Examination


Vital Signs: 


 Vital Signs











Temperature  98.7 F   03/14/18 09:00


 


Pulse Rate  61   03/14/18 09:00


 


Respiratory Rate  18   03/14/18 09:00


 


Blood Pressure  129/64   03/14/18 09:00


 


O2 Sat by Pulse Oximetry (%)  95   03/14/18 09:00











Constitutional: Yes: Well Nourished, No Distress, Calm


Cardiovascular: Yes: Pulse Irregular


Respiratory: Yes: Regular


Gastrointestinal: Yes: Normal Bowel Sounds, Soft


Musculoskeletal: Yes: Other (Right shoulder pain)


Extremities: Yes: WNL


Edema: No


Peripheral Pulses WNL: Yes


Neurological: Yes: Alert, Oriented


Psychiatric: Yes: Alert, Oriented


Labs: 


 CBC, BMP





 03/14/18 07:06 





 03/14/18 07:06 











Discharge Summary


Reason For Visit: HEMATURIA,FAIL TO THRIVE,LEUKOCYTOSIS,R SHOULD JULIO CESAR


Current Active Problems





Failure to thrive (Acute)


Hematuria (Acute)


Leukocytosis (Acute)


Poor appetite (Acute)


Shoulder pain (Acute)


Unintentional weight loss (Acute)








Hospital Course: 





This is a 78 YOF with h/o Afib (on Eliquis), hemorrhoids with rectal bleeding, 

HLD, chronic back pain, moderate MV stenosis, osteoporosis, and mediastinal 

lymphadenopathy sent by her PCP Mukund Kang because for the past month she 

has been having worsening decreased PO intake, fatigue, lightheadedness, and 

generalized weakness, as well as a 10 lb weight loss. The patient has chronic 

right shoulder pain and had been scheduled to have surgery with Dr. George 

tomorrow. She stopped her Eliquis three days ago in preparation for this, but 

saw Dr. Kang this morning for her fatigue and he instructed her to come into 

the ED for admission. The patient herself states that her decreased PO intake 

is d/t shoulder pain. She notes that her last colonoscopy was 2 years ago with 

Dr. Pennington.





Condition: Stable





- Instructions


Diet, Activity, Other Instructions: 


-Follow up with GI- Chung Mueller within next 2 weeks


-Follow up with Dr Misael George within 1 week


-Follow up with PCP within 1 week


Referrals: 


Ian Garcia [Primary Care Provider] - 


Chung Granda MD [Staff Physician] - 


Misael George MD [Staff Physician] - 


Disposition: HOME





- Home Medications


Comprehensive Discharge Medication List: 


Ambulatory Orders





Sotalol HCl [Betapace -] 80 mg PO BID #28 tablet 10/19/14 


Valsartan [Diovan] 160 mg PO DAILY #14 tablet 07/24/15 


Diltiazem Cd [Cardizem Cd -] 360 mg PO DAILY #30 cap.cd.24h 01/05/17 


Pravastatin Sodium [Pravachol -] 40 mg PO HS 05/10/17 


Acetaminophen W/ Codeine #3 [Tylenol # 3 -] 1 tab PO Q6H PRN #12 tablet MDD 4 

tabs 11/14/17 


Fluticasone Prop 0.05% Nasal [Flonase -] 1 - 2 spray NS DAILY #1 spray.pump 11/ 14/17 


Lidocaine 5% Patch [Lidoderm -] 1 patch TP DAILY #3 patch 11/14/17 


Pantoprazole Sodium [Protonix -] 40 mg PO DAILY #30 tablet.ec 12/26/17 


Apixaban [Eliquis -] 5 mg PO BID  tablet 03/14/18

## 2018-03-14 NOTE — HP
Admitting History and Physical





- Primary Care Physician


PCP: Radha Kang (Carlos Luna)





- Admission


Chief Complaint: Weakness


History of Present Illness: 





This is a 78 YOF with h/o Afib (on Eliquis), hemorrhoids with rectal bleeding, 

HLD, chronic back pain, moderate MV stenosis, osteoporosis, and mediastinal 

lymphadenopathy sent by her PCP Mukund Kang because for the past month she 

has been having worsening decreased PO intake, fatigue, lightheadedness, and 

generalized weakness, as well as a 10 lb weight loss. The patient has chronic 

right shoulder pain and had been scheduled to have surgery with Dr. George 

tomorrow. She stopped her Eliquis three days ago in preparation for this, but 

saw Dr. Kang this morning for her fatigue and he instructed her to come into 

the ED for admission. The patient herself states that her decreased PO intake 

is d/t shoulder pain. She notes that her last colonoscopy was 2 years ago with 

Dr. Pennington.





History Source: Patient, Medical Record


Limitations to Obtaining History: No Limitations





- Past Medical History


Cardiovascular: Yes: AFIB, CAD, HTN, Hyperlipdemia


Gastrointestinal: Yes: Diverticulosis, Hemorrhoids.  No: Constipation


Musculoskeletal: Yes: Osteoarthritis (R shoulder/injury)





- Past Surgical History


Past Surgical History: Yes: Colonoscopy, Oopherectomy (right), Permanent 

Pacemaker, Upper Endoscopy





- Smoking History


Smoking history: Never smoked


Have you smoked in the past 12 months: No


Aproximately how many cigarettes per day: 0


If you are a former smoker, when did you quit?: 25 yrs





- Alcohol/Substance Use


Hx Alcohol Use: No


History of Substance Use: reports: None





- Social History


ADL: Independent


History of Recent Travel: No





Home Medications





- Allergies


Allergies/Adverse Reactions: 


 Allergies











Allergy/AdvReac Type Severity Reaction Status Date / Time


 


Penicillins AdvReac Mild Itching Verified 03/13/18 14:23














- Home Medications


Home Medications: 


Ambulatory Orders





Sotalol HCl [Betapace -] 80 mg PO BID #28 tablet 10/19/14 


Valsartan [Diovan] 160 mg PO DAILY #14 tablet 07/24/15 


Diltiazem Cd [Cardizem Cd -] 360 mg PO DAILY #30 cap.cd.24h 01/05/17 


Pravastatin Sodium [Pravachol -] 40 mg PO HS 05/10/17 


Acetaminophen W/ Codeine #3 [Tylenol # 3 -] 1 tab PO Q6H PRN #12 tablet MDD 4 

tabs 11/14/17 


Fluticasone Prop 0.05% Nasal [Flonase -] 1 - 2 spray NS DAILY #1 spray.pump 11/ 14/17 


Lidocaine 5% Patch [Lidoderm -] 1 patch TP DAILY #3 patch 11/14/17 


Pantoprazole Sodium [Protonix -] 40 mg PO DAILY #30 tablet.ec 12/26/17 


Apixaban [Eliquis -] 5 mg PO BID  tablet 03/14/18 











Review of Systems





- Review of Systems


Constitutional: reports: Unintentional Wgt. Loss


Eyes: reports: No Symptoms


HENT: reports: No Symptoms


Neck: reports: No Symptoms


Cardiovascular: reports: No Symptoms


Respiratory: reports: No Symptoms


Gastrointestinal: reports: No Symptoms


Genitourinary: reports: No Symptoms


Breasts: reports: No Symptoms Reported


Musculoskeletal: reports: Other (Left shoulder pain)


Integumentary: reports: No Symptoms


Neurological: reports: No Symptoms


Endocrine: reports: No Symptoms


Hematology/Lymphatic: reports: No Symptoms


Psychiatric: reports: No Symptoms





Physical Examination


Vital Signs: 


 Vital Signs











Temperature  98.7 F   03/14/18 09:00


 


Pulse Rate  61   03/14/18 09:00


 


Respiratory Rate  18   03/14/18 09:00


 


Blood Pressure  129/64   03/14/18 09:00


 


O2 Sat by Pulse Oximetry (%)  95   03/14/18 09:00











Constitutional: Yes: Well Nourished, No Distress, Calm


Cardiovascular: Yes: Pulse Irregular


Respiratory: Yes: Regular


Gastrointestinal: Yes: Normal Bowel Sounds, Soft


Musculoskeletal: Yes: Other (right shoulder pain)


Extremities: Yes: WNL


Neurological: Yes: Alert, Oriented


Psychiatric: Yes: Alert, Oriented


Labs: 


 CBC, BMP





 03/14/18 07:06 





 03/14/18 07:06 











Problem List





- Problems


(1) Unintentional weight loss


Assessment/Plan: 


seen by GI


Colonoscopy outpatient with GI- Dr Jesus Alberto Delgado antigen pending


Code(s): R63.4 - ABNORMAL WEIGHT LOSS   





(2) Atrial fibrillation, rapid


Assessment/Plan: 


-chronic


-on Eliquis


Code(s): I48.91 - UNSPECIFIED ATRIAL FIBRILLATION   





(3) Leukocytosis


Assessment/Plan: 


-likely inflammatory


Code(s): D72.829 - ELEVATED WHITE BLOOD CELL COUNT, UNSPECIFIED   





(4) Shoulder pain, right


Assessment/Plan: 


-chronic pain 2/2 to OA


-had surgery scheduled with Dr George


-F/U with Dr George outpatient


Code(s): M25.511 - PAIN IN RIGHT SHOULDER   


Qualifiers: 


   Chronicity: chronic   Qualified Code(s): M25.511 - Pain in right shoulder; 

G89.29 - Other chronic pain; G89.29 - Other chronic pain   





Assessment/Plan





see problem list

## 2018-03-14 NOTE — EKG
Test Reason : 

Blood Pressure : ***/*** mmHG

Vent. Rate : 060 BPM     Atrial Rate : 060 BPM

   P-R Int : 302 ms          QRS Dur : 140 ms

    QT Int : 516 ms       P-R-T Axes : 072 -30 122 degrees

   QTc Int : 516 ms

 

Atrial-paced rhythm with prolonged AV conduction

LEFT AXIS DEVIATION

LEFT BUNDLE BRANCH BLOCK

ABNORMAL ECG

WHEN COMPARED WITH ECG OF 23-DEC-2017 12:43,

NO SIGNIFICANT CHANGE WAS FOUND

Confirmed by ANGELICA MCDANIEL, GURPREET ([id number]) on 3/14/2018 10:48:25 AM

 

Referred By:             Confirmed By:GURPREET DOMINGUEZ MD

## 2018-03-15 LAB — CEA SERPL-MCNC: 1.9 NG/ML (ref 0–4.7)

## 2018-04-05 ENCOUNTER — HOSPITAL ENCOUNTER (OUTPATIENT)
Dept: HOSPITAL 74 - JASU-SURG | Age: 78
Discharge: HOME | End: 2018-04-05
Attending: ORTHOPAEDIC SURGERY
Payer: COMMERCIAL

## 2018-04-05 VITALS — BODY MASS INDEX: 25.4 KG/M2

## 2018-04-05 VITALS — TEMPERATURE: 97.6 F

## 2018-04-05 VITALS — SYSTOLIC BLOOD PRESSURE: 128 MMHG | HEART RATE: 62 BPM | DIASTOLIC BLOOD PRESSURE: 59 MMHG

## 2018-04-05 DIAGNOSIS — M19.011: ICD-10-CM

## 2018-04-05 DIAGNOSIS — M75.101: ICD-10-CM

## 2018-04-05 DIAGNOSIS — M75.41: Primary | ICD-10-CM

## 2018-04-05 PROCEDURE — 0RBJ4ZZ EXCISION OF RIGHT SHOULDER JOINT, PERCUTANEOUS ENDOSCOPIC APPROACH: ICD-10-PCS | Performed by: ORTHOPAEDIC SURGERY

## 2018-04-05 PROCEDURE — 0PB94ZZ EXCISION OF RIGHT CLAVICLE, PERCUTANEOUS ENDOSCOPIC APPROACH: ICD-10-PCS | Performed by: ORTHOPAEDIC SURGERY

## 2018-04-05 NOTE — OP
DATE OF OPERATION:  04/05/2018

 

PREOPERATIVE DIAGNOSES:  Right shoulder impingement syndrome and acromioclavicular

joint arthritis and partial rotator cuff tear.

 

POSTOPERATIVE DIAGNOSES:  Right shoulder impingement syndrome and acromioclavicular

joint arthritis and partial rotator cuff tear.

 

PROCEDURE:  Right shoulder arthroscopy, subacromial decompression, and distal

clavicle excision.

 

SURGEON:  Misael Samuel MD

 

ASSISTANTS:  None.

 

ANESTHESIA:  A right interscalene block with MAC anesthesia.

 

DRAINS:  None.

 

COMPLICATIONS:  None.

 

BLOOD LOSS:  50 mL

 

BLOOD GIVEN:  None.

 

FLUID REPLACEMENT:  700 mL

 

This patient is a 78-year-old female with right shoulder pain, decreased range of

motion, decreased function, and weakness.  After understanding the potential risks,

complications, alternatives, and benefits of surgery versus nonsurgical treatment,

the patient elected to undergo this procedure.  She and her family understand that

she likely will not have complete relief of her symptoms, will continue to have

weakness, decreased range of motion which will require physical therapy.

 

The patient was brought to the operating room, peripheral IV placed and IV sedation

given.  Next, 600 mg of clindamycin were given.  A right interscalene block was

performed.  MAC anesthesia was induced.  Patient has a pacemaker.  She was placed

into the beach chair position with ample padding throughout.  The right upper

extremity was then prepped and draped in sterile fashion.  The bony landmarks were

marked out with a marking pen.  A posterior portal was established with a number-15

scalpel blade.  Trocar and arthroscope were then introduced into the glenohumeral

joint, and a diagnostic glenohumeral arthroscopy was performed.

 

The patient was seen to have significant grade 4 osteoarthritis of both the humeral

head and the glenoid.  The labrum was quite frayed as was the biceps tendon.  The

undersurface of the rotator cuff was frayed, but there was no point of complete

rotator cuff tear, and as I directly visualized the undersurface of the rotator cuff,

moving the arm through full range of motion, there was definitely a significant

portion that was attached and intact.

 

Next, our attention was turned to the subacromial space.  The patient had a lot of

inflammatory bursitis.  A lateral portal was established under direct visualization

using a spinal needle and number-15 scalpel blade, and a green cannula was introduced

in the subacromial space.  Using the ArthroCare wand, I did an extensive

debridement/soft tissue bursectomy.  This revealed a large subacromial spur and a

moderate-size subclavicular spur.  I was able to visualize the top of the rotator

cuff, and this all seemed to be intact, directly visualized it, through a full range

of motion of the right upper extremity.  Therefore, no rotator cuff repair was

possible or necessary.  After the extensive debridement, with the ArthroCare wand, I

did a bony subacromial decompression using the 5.5-mm oval abdelrahman.  I also took down

the infraclavicular bony spur.  I then fine tuned it in reverse, and then, using the

shaver, to fine tune it further and remove all debris.  Again, I looked at the top

surface of the rotator cuff, and again, there was nothing to repair; it was intact. 

The area was copiously irrigated and washed out.  All instrumentation and saline

removed.  The arthroscopy portals were closed with 3-0 nylon sutures.  The area was

then washed and dried, covered with an Aquacel dressing.  The patient was put into a

sling, brought down out of the beach chair position.  Total operative time was about

40 minutes.  There were no complications during the case.  The patient tolerated the

procedure quite well and was brought to the ambulatory recovery room in stable

condition.

 

 

MISAEL SAMUEL M.D.

 

[id number]

DD: 04/05/2018 09:35

DT: 04/05/2018 09:59

Job #:  [id number]

## 2018-04-05 NOTE — HP
Satellite PMH





- Chief Complaint


Chief Complaint: right shoulder pain, weakness, decreased ROM


History of Present Illness: right shoulder impingement, possible RTC tear


History Source: Patient


Limitations to Obtaining History: No Limitations





- Past Medical History


Allergies/Adverse Reactions: 


 Allergies











Allergy/AdvReac Type Severity Reaction Status Date / Time


 


Penicillins AdvReac Mild Itching Verified 03/13/18 14:23











Cardiovascular: Yes: AFIB, CAD, HTN, Hyperlipdemia


Gastrointestinal: Yes: Diverticulosis, Hemorrhoids.  No: Constipation


Musculoskeletal: Yes: Osteoarthritis (R shoulder/injury)





- Current Medications


Current Medications: 


 Home Medications











 Medication  Instructions  Recorded


 


Sotalol HCl [Betapace -] 80 mg PO BID #28 tablet 10/19/14


 


Valsartan [Diovan] 160 mg PO DAILY #14 tablet 07/24/15


 


Diltiazem Cd [Cardizem Cd -] 360 mg PO DAILY #30 cap.cd.24h 01/05/17


 


Pravastatin Sodium [Pravachol -] 40 mg PO HS 05/10/17


 


Apixaban [Eliquis -] 5 mg PO BID  tablet 03/14/18














Satellite Physical Exam





- Physical Examination


Vital Signs: 


 Vital Signs











 Period  Temp  Pulse  Resp  BP Sys/Shipley  Pulse Ox


 


 Last 24 Hr  97.9 F-97.9 F  61-61  16-16  141-141/64-64  97











General Appearance: Well Nourished


ENT: Clear


Lung: Clear to auscultation


Heart: Regular rate & rhythm


Breasts: Soft


Abdomen: Soft


Extremities: No edema





Satellite Impression/Plan





- Impression/Plan


Impression: right shoulder impingement syndrome, possible RTC tear


Operative Procedure: right shoulder arthroscopy, decompression, possible RTC 

repair, manipulation


Date to be Performed: 04/05/18

## 2018-04-05 NOTE — OP
Operative Note





- Note:


Operative Date: 04/05/18


Pre-Operative Diagnosis: right shoulder subacromial impingement, ACJ OA


Operation: right shoulder arthroscopy, decompression, distal clavicle excision


Post-Operative Diagnosis: Same as Pre-op


Surgeon: Misael George


Anesthesiologist/CRNA: Merly Chow


Anesthesia: Local, MAC


Specimens Removed: shavings


Estimated Blood Loss (mls): 50


Drains, Volume Out (mls): 0


Blood Volume Replaced (mls): 0


Fluid Volume Replaced (mls): 700


Operative Report Dictated: Yes

## 2018-04-06 NOTE — PATH
Surgical Pathology Report



Patient Name:  ANA SELLERS

Accession #:  [id number]

Med. Rec. #:  [id number]                                                        

   /Age/Gender:  1940 (Age: 78) / F

Account:  [id number]                                                          

             Location: [facility]

Taken:  2018

Received:  2018

Reported:  2018

Physicians:  Misael George M.D.

  



Specimen(s) Received

 RIGHT SHOULDER SHAVINGS 





Clinical History

Right shoulder impingement







Final Diagnosis

RIGHT SHOULDER, ARTHROSCOPIC SHAVING: 

PORTIONS OF SYNOVIUM, CARTILAGE, SKELETAL MUSCLE AND BONE CONSISTENT WITH

ARTHROSCOPIC SHAVINGS.





***Electronically Signed***

Ian Urbano M.D.





Gross Description

Received in formalin, labeled "right shoulder shavings," is a 4.0 x 3.3 x 0.4

cm. aggregate of tan-yellow soft tissue fragments. A representative portion is

submitted in one cassette.

/2018



[place]

## 2018-04-17 ENCOUNTER — HOSPITAL ENCOUNTER (EMERGENCY)
Dept: HOSPITAL 74 - JER | Age: 78
Discharge: HOME | End: 2018-04-17
Payer: COMMERCIAL

## 2018-04-17 VITALS — SYSTOLIC BLOOD PRESSURE: 123 MMHG | TEMPERATURE: 98 F | DIASTOLIC BLOOD PRESSURE: 71 MMHG | HEART RATE: 59 BPM

## 2018-04-17 VITALS — BODY MASS INDEX: 27.9 KG/M2

## 2018-04-17 DIAGNOSIS — Z87.19: ICD-10-CM

## 2018-04-17 DIAGNOSIS — K29.70: Primary | ICD-10-CM

## 2018-04-17 DIAGNOSIS — Z87.891: ICD-10-CM

## 2018-04-17 DIAGNOSIS — Z85.828: ICD-10-CM

## 2018-04-17 LAB
ALBUMIN SERPL-MCNC: 3.5 G/DL (ref 3.4–5)
ALP SERPL-CCNC: 79 U/L (ref 45–117)
ALT SERPL-CCNC: 18 U/L (ref 12–78)
ANION GAP SERPL CALC-SCNC: 8 MMOL/L (ref 8–16)
APPEARANCE UR: CLEAR
AST SERPL-CCNC: 26 U/L (ref 15–37)
BASOPHILS # BLD: 0.5 % (ref 0–2)
BILIRUB SERPL-MCNC: 0.2 MG/DL (ref 0.2–1)
BILIRUB UR STRIP.AUTO-MCNC: NEGATIVE MG/DL
BUN SERPL-MCNC: 13 MG/DL (ref 7–18)
CALCIUM SERPL-MCNC: 9 MG/DL (ref 8.5–10.1)
CHLORIDE SERPL-SCNC: 108 MMOL/L (ref 98–107)
CO2 SERPL-SCNC: 24 MMOL/L (ref 21–32)
COLOR UR: (no result)
CREAT SERPL-MCNC: 0.6 MG/DL (ref 0.55–1.02)
DEPRECATED RDW RBC AUTO: 16.3 % (ref 11.6–15.6)
EOSINOPHIL # BLD: 1.4 % (ref 0–4.5)
EPITH CASTS URNS QL MICRO: (no result) /HPF
GLUCOSE SERPL-MCNC: 91 MG/DL (ref 74–106)
HCT VFR BLD CALC: 34.4 % (ref 32.4–45.2)
HGB BLD-MCNC: 11.3 GM/DL (ref 10.7–15.3)
KETONES UR QL STRIP: NEGATIVE
LEUKOCYTE ESTERASE UR QL STRIP.AUTO: NEGATIVE
LIPASE SERPL-CCNC: 129 U/L (ref 73–393)
LYMPHOCYTES # BLD: 16.7 % (ref 8–40)
MCH RBC QN AUTO: 28.9 PG (ref 25.7–33.7)
MCHC RBC AUTO-ENTMCNC: 32.9 G/DL (ref 32–36)
MCV RBC: 87.7 FL (ref 80–96)
MONOCYTES # BLD AUTO: 8.7 % (ref 3.8–10.2)
MUCOUS THREADS URNS QL MICRO: (no result)
NEUTROPHILS # BLD: 72.7 % (ref 42.8–82.8)
NITRITE UR QL STRIP: NEGATIVE
PH UR: 7 [PH] (ref 5–8)
PLATELET # BLD AUTO: 286 K/MM3 (ref 134–434)
PMV BLD: 9.2 FL (ref 7.5–11.1)
POTASSIUM SERPLBLD-SCNC: 4.5 MMOL/L (ref 3.5–5.1)
PROT SERPL-MCNC: 7.2 G/DL (ref 6.4–8.2)
PROT UR QL STRIP: NEGATIVE
PROT UR QL STRIP: NEGATIVE
RBC # BLD AUTO: 3.92 M/MM3 (ref 3.6–5.2)
RBC # UR STRIP: (no result) /UL
SODIUM SERPL-SCNC: 140 MMOL/L (ref 136–145)
SP GR UR: 1.01 (ref 1–1.03)
UROBILINOGEN UR STRIP-MCNC: NEGATIVE MG/DL (ref 0.2–1)
WBC # BLD AUTO: 10.3 K/MM3 (ref 4–10)

## 2018-04-17 PROCEDURE — 3E033GC INTRODUCTION OF OTHER THERAPEUTIC SUBSTANCE INTO PERIPHERAL VEIN, PERCUTANEOUS APPROACH: ICD-10-PCS | Performed by: EMERGENCY MEDICINE

## 2018-04-17 NOTE — PDOC
History of Present Illness





- General


Chief Complaint: Pain, Acute


Stated Complaint: VOMITING


Time Seen by Provider: 04/17/18 14:21





- History of Present Illness


Initial Comments: 





04/17/18 17:50


The patient is a 78 year old female with past medical history of hyperlipidemia

, diverticulitis, hemorrhoids, gastritis, and atrial fibrillation who presents 

to the ED for complaints of abdominal pain that began a few days ago. She 

complains of a diffuse cramping pain but also a burning pain in her epigastrium 

that is worsened with lying down and with food. She reports nausea the past few 

days but experienced one episode of vomiting today after eating soup. She 

denies any fevers, chills, diarrhea, constipation, cough, SOB, CP, or urinary 

symptoms. 


Pt is followed by Dr. Granda, has had prior EGDs that showed gastritis. She was 

previously on nexium but admits that she stopped taking it on her own.





Past History





- Past Medical History


Allergies/Adverse Reactions: 


 Allergies











Allergy/AdvReac Type Severity Reaction Status Date / Time


 


Penicillins AdvReac Mild Itching Verified 04/17/18 13:59











Home Medications: 


Ambulatory Orders





Sotalol HCl [Betapace -] 80 mg PO BID #28 tablet 10/19/14 


Valsartan [Diovan] 160 mg PO DAILY #14 tablet 07/24/15 


Diltiazem Cd [Cardizem Cd -] 360 mg PO DAILY #30 cap.cd.24h 01/05/17 


Pravastatin Sodium [Pravachol -] 40 mg PO HS 05/10/17 


Apixaban [Eliquis -] 5 mg PO BID  tablet 03/14/18 


Hydrocodone/Acetaminophen [Hydrocodone-Acetamin 5-325 mg] 1 - 2 tab PO TID PRN #

40 tablet MDD 6 04/05/18 


Esomeprazole Magnesium [Nexium 24Hr] 20 mg PO DAILY #30 capsule. 04/17/18 








Anemia: No


Asthma: No


Cancer: Yes (SKIN CA- EARLOBE)


Cardiac Disorders: Yes (AFIB,)


CVA: No


COPD: No


CHF: No


Dementia: No


Diabetes: No


GI Disorders: Yes (GASTRITIS, diverticlitis)


 Disorders: No


HTN: Yes


Hypercholesterolemia: Yes


Liver Disease:  (FATTY LIVER)


Seizures: No


Thyroid Disease: No





- Surgical History


Abdominal Surgery: No


Appendectomy: No


Cardiac Surgery: Yes (CABG)


Cholecystectomy: No


Lung Surgery: No


Neurologic Surgery: No


Orthopedic Surgery: Yes





- Family Disease History


Family Disease History: Heart Disease: Father





- Immunization History


Immunization Up to Date: Yes





- Suicide/Smoking/Psychosocial Hx


Smoking Status: Yes


Smoking History: Former smoker


Have you smoked in the past 12 months: Yes


Number of Cigarettes Smoked Daily: 0


If you are a former smoker, when did you quit?: 25 yrs


Information on smoking cessation initiated: No


Hx Alcohol Use: No


Drug/Substance Use Hx: No


Substance Use Type: None


Hx Substance Use Treatment: No





**Review of Systems





- Review of Systems


Comments:: 





04/17/18 17:54


"GENERAL/CONSTITUTIONAL: No fever or chills. No weakness.


HEAD, EYES, EARS, NOSE AND THROAT: No change in vision. No ear pain or 

discharge. No sore throat.


CARDIOVASCULAR: No chest pain or shortness of breath.


RESPIRATORY: No cough, wheezing, or hemoptysis.


GASTROINTESTINAL:(+)  nausea, vomiting, abdominal pain. No diarrhea or 

constipation.


GENITOURINARY: No dysuria, frequency, or change in urination.


MUSCULOSKELETAL: No joint or muscle swelling or pain. No neck or back pain.


SKIN: No rash


NEUROLOGIC: No headache, vertigo, loss of consciousness, or change in strength/

sensation.


ENDOCRINE: No increased thirst. No abnormal weight change.


HEMATOLOGIC/LYMPHATIC: No anemia, easy bleeding, or history of blood clots.


ALLERGIC/IMMUNOLOGIC: No hives or skin allergy.


"





*Physical Exam





- Vital Signs


 Last Vital Signs











Temp Pulse Resp BP Pulse Ox


 


 97.7 F   67   19   120/59   98 


 


 04/17/18 14:00  04/17/18 14:00  04/17/18 14:00  04/17/18 14:00  04/17/18 14:00














- Physical Exam


Comments: 





04/17/18 17:55


"GENERAL: Awake, alert, and fully oriented, in no acute distress.


HEAD: No signs of trauma


EYES: PERRLA, EOMI, sclera anicteric, conjunctiva clear


ENT: Auricles normal inspection, hearing grossly normal, nares patent, 

oropharynx clear without exudates. Moist mucosa


NECK: Nontender, no stepoffs, Normal ROM, supple, no lymphadenopathy, JVD, or 

masses


LUNGS: Breath sounds equal, clear to auscultation bilaterally.  No wheezes, and 

no crackles


HEART: Regular rate and rhythm, normal S1 and S2, no murmurs, rubs or gallops


ABDOMEN: + epigastric TTP, normoactive bowel sounds.  No guarding, no rebound.  

No masses


EXTREMITIES: Normal range of motion, no edema.  No clubbing or cyanosis. No 

cords, erythema, or tenderness


NEUROLOGICAL: Cranial nerves II through XII intact. 5/5 strength and sensation 

in all extremities, Normal speech, normal gait, normal cerebellar function


SKIN: Warm, Dry, normal turgor, no rashes or lesions noted.


"





**Heart Score/ECG Review





- History


History: Slightly suspicious





- Electrocardiogram


EKG: Normal





- Age


Age: >/= 65





- Risk Factors


Risk Factors Heart Score: Yes Hx Hypercholesterolemia, Yes Hx Obesity





- Troponin


Troponin: </= normal limit





- ECG Impressions


Comment:: 





04/17/18 17:57


AV paced, rate 60, no DEEPIKA/STDs, unchanged since prior





ED Treatment Course





- LABORATORY


CBC & Chemistry Diagram: 


 04/17/18 15:15





 04/17/18 15:15





- ADDITIONAL ORDERS


Additional order review: 


 Laboratory  Results











  04/17/18 04/17/18 04/17/18





  15:40 15:40 15:15


 


Sodium    140


 


Potassium    4.5


 


Chloride    108 H


 


Carbon Dioxide    24


 


Anion Gap    8


 


BUN    13


 


Creatinine    0.6


 


Creat Clearance w eGFR    > 60


 


Random Glucose    91


 


Lactic Acid  1.3  


 


Calcium    9.0


 


Total Bilirubin    0.2  D


 


AST    26


 


ALT    18


 


Alkaline Phosphatase    79


 


Creatine Kinase   


 


Troponin I   


 


Total Protein    7.2


 


Albumin    3.5


 


Lipase    129


 


Urine Color   Ltyellow 


 


Urine Appearance   Clear 


 


Urine pH   7.0 


 


Ur Specific Gravity   1.006 


 


Urine Protein   Negative 


 


Urine Glucose (UA)   Negative 


 


Urine Ketones   Negative 


 


Urine Blood   2+ H 


 


Urine Nitrite   Negative 


 


Urine Bilirubin   Negative 


 


Urine Urobilinogen   Negative 


 


Ur Leukocyte Esterase   Negative 


 


Urine WBC (Auto)   <1 


 


Urine RBC (Auto)   3 


 


Ur Epithelial Cells   Rare 


 


Urine Mucus   Rare 














  04/17/18





  15:15


 


Sodium 


 


Potassium 


 


Chloride 


 


Carbon Dioxide 


 


Anion Gap 


 


BUN 


 


Creatinine 


 


Creat Clearance w eGFR 


 


Random Glucose 


 


Lactic Acid 


 


Calcium 


 


Total Bilirubin 


 


AST 


 


ALT 


 


Alkaline Phosphatase 


 


Creatine Kinase  44


 


Troponin I  < 0.02


 


Total Protein 


 


Albumin 


 


Lipase 


 


Urine Color 


 


Urine Appearance 


 


Urine pH 


 


Ur Specific Gravity 


 


Urine Protein 


 


Urine Glucose (UA) 


 


Urine Ketones 


 


Urine Blood 


 


Urine Nitrite 


 


Urine Bilirubin 


 


Urine Urobilinogen 


 


Ur Leukocyte Esterase 


 


Urine WBC (Auto) 


 


Urine RBC (Auto) 


 


Ur Epithelial Cells 


 


Urine Mucus 








 











  04/17/18





  15:15


 


RBC  3.92


 


MCV  87.7


 


MCHC  32.9


 


RDW  16.3 H


 


MPV  9.2


 


Neutrophils %  72.7


 


Lymphocytes %  16.7  D


 


Monocytes %  8.7


 


Eosinophils %  1.4


 


Basophils %  0.5














- RADIOLOGY


Radiology Studies Ordered: 














 Category Date Time Status


 


 ABDOMEN & PELVIS CT WITH CONTR [CT] Stat CT Scan  04/17/18 15:10 Ordered














- Medications


Given in the ED: 


ED Medications














Discontinued Medications














Generic Name Dose Route Start Last Admin





  Trade Name Keyana  PRN Reason Stop Dose Admin


 


Famotidine/Sodium Chloride  20 mg in 50 mls @ 100 mls/hr  04/17/18 15:15  04/17/ 18 15:15





  Pepcid 20 Mg Premixed Ivpb -  IVPB  04/17/18 15:44  100 mls/hr





  ONCE ONE   Administration


 


Sodium Chloride  1,000 mls @ 1,000 mls/hr  04/17/18 15:10  04/17/18 15:35





  Normal Saline -  IV  04/17/18 16:09  1,000 mls/hr





  ASDIR STA   Administration


 


Ondansetron HCl  4 mg  04/17/18 15:10  04/17/18 15:20





  Zofran Injection  IVPB  04/17/18 15:11  4 mg





  ONCE ONE   Administration














Medical Decision Making





- Medical Decision Making





04/17/18 17:58


78 F with epigastric pain, N+V. Exam and history consistent with pt's h/o 

gastritis. Pt with no CP/SOB but will obtain trop to r/o ACS. Pt with no lower 

abdominal tenderness to suggest appy/colitis/diverticulitis. Negative polanco's 

on exam.


- Labs, lipase, trop


- IVF, GI cocktail


- Reassess





04/17/18 18:06


Labs wnl


Pt reassessed s/p GI meds - now completely asymptomatic.


Pt tolerating PO without nausea/vomiting.


Abdominal exam completely benign at this time.


Pt is well appearing, with normal vitals. Clinically stable for DC at this time.


I discussed the physical exam findings, ancillary test results and final 

diagnoses with the patient. I answered all of the patient's questions. The 

patient was satisfied with the care received and felt comfortable with the 

discharge plan and treatment plan.  The patient agrees to follow up with the 

primary care physician within 24-72 hours.








*DC/Admit/Observation/Transfer


Diagnosis at time of Disposition: 


 Gastritis








- Discharge Dispostion


Disposition: HOME





- Referrals


Referrals: 


Radha Kang MD [Primary Care Provider] - 


Chung Granda MD [Staff Physician] - 





- Patient Instructions


Printed Discharge Instructions:  DI for Gastritis


Additional Instructions: 


You must follow up with Dr. Granda within 1 week for an endoscopy. You likely 

have gastritis or ulcers in your stomach.


Take the nexium as prescribed every day to prevent worsening pain.


If you experience severe pain, nausea, vomiting, fevers, or any other 

concerning symptoms, return to the ER immediately.


Print Language: Arabic





- Post Discharge Activity





- Attestations


Physician Attestion: 





04/17/18 18:09








I, Dr. Bhaskar Fowler MD, attest that this document has been prepared under my 

direction and personally reviewed by me in its entirety.   I further attest, 

that it accurately reflects all work, treatment, procedures and medical decision

-making performed by me.

## 2018-04-17 NOTE — EKG
Test Reason : 

Blood Pressure : ***/*** mmHG

Vent. Rate : 060 BPM     Atrial Rate : 060 BPM

   P-R Int : 274 ms          QRS Dur : 136 ms

    QT Int : 518 ms       P-R-T Axes : 082 -27 092 degrees

   QTc Int : 518 ms

 

Atrial-paced rhythm with prolonged AV conduction

LEFT BUNDLE BRANCH BLOCK

ABNORMAL ECG

WHEN COMPARED WITH ECG OF 13-MAR-2018 16:01,

T WAVE INVERSION MORE EVIDENT IN LATERAL LEADS

Confirmed by MD Donal, Lionel ([id number]) on 4/17/2018 4:40:55 PM

 

Referred By:             Confirmed By:Lionel Mansfield MD

## 2018-05-05 ENCOUNTER — HOSPITAL ENCOUNTER (EMERGENCY)
Dept: HOSPITAL 74 - JER | Age: 78
Discharge: HOME | End: 2018-05-05
Payer: COMMERCIAL

## 2018-05-05 VITALS — SYSTOLIC BLOOD PRESSURE: 124 MMHG | TEMPERATURE: 98.6 F | HEART RATE: 62 BPM | DIASTOLIC BLOOD PRESSURE: 65 MMHG

## 2018-05-05 VITALS — BODY MASS INDEX: 24.7 KG/M2

## 2018-05-05 DIAGNOSIS — I10: ICD-10-CM

## 2018-05-05 DIAGNOSIS — E78.5: ICD-10-CM

## 2018-05-05 DIAGNOSIS — Z85.828: ICD-10-CM

## 2018-05-05 DIAGNOSIS — K21.9: Primary | ICD-10-CM

## 2018-05-05 DIAGNOSIS — I25.10: ICD-10-CM

## 2018-05-05 DIAGNOSIS — I48.91: ICD-10-CM

## 2018-05-05 DIAGNOSIS — Z79.01: ICD-10-CM

## 2018-05-05 DIAGNOSIS — E78.00: ICD-10-CM

## 2018-05-05 LAB
ALBUMIN SERPL-MCNC: 3.7 G/DL (ref 3.4–5)
ALP SERPL-CCNC: 73 U/L (ref 45–117)
ALT SERPL-CCNC: 12 U/L (ref 12–78)
ANION GAP SERPL CALC-SCNC: 11 MMOL/L (ref 8–16)
AST SERPL-CCNC: 17 U/L (ref 15–37)
BILIRUB SERPL-MCNC: 0.3 MG/DL (ref 0.2–1)
BUN SERPL-MCNC: 11 MG/DL (ref 7–18)
CALCIUM SERPL-MCNC: 9.5 MG/DL (ref 8.5–10.1)
CHLORIDE SERPL-SCNC: 108 MMOL/L (ref 98–107)
CO2 SERPL-SCNC: 24 MMOL/L (ref 21–32)
CREAT SERPL-MCNC: 0.6 MG/DL (ref 0.55–1.02)
DEPRECATED RDW RBC AUTO: 16.1 % (ref 11.6–15.6)
GLUCOSE SERPL-MCNC: 104 MG/DL (ref 74–106)
HCT VFR BLD CALC: 34.2 % (ref 32.4–45.2)
HGB BLD-MCNC: 11.4 GM/DL (ref 10.7–15.3)
MCH RBC QN AUTO: 29.1 PG (ref 25.7–33.7)
MCHC RBC AUTO-ENTMCNC: 33.3 G/DL (ref 32–36)
MCV RBC: 87.3 FL (ref 80–96)
PLATELET # BLD AUTO: 252 K/MM3 (ref 134–434)
PLATELET BLD QL SMEAR: ADEQUATE
PMV BLD: 9.6 FL (ref 7.5–11.1)
POTASSIUM SERPLBLD-SCNC: 3.9 MMOL/L (ref 3.5–5.1)
PROT SERPL-MCNC: 7.4 G/DL (ref 6.4–8.2)
RBC # BLD AUTO: 3.92 M/MM3 (ref 3.6–5.2)
SODIUM SERPL-SCNC: 143 MMOL/L (ref 136–145)
WBC # BLD AUTO: 8.8 K/MM3 (ref 4–10)

## 2018-05-05 PROCEDURE — 3E033GC INTRODUCTION OF OTHER THERAPEUTIC SUBSTANCE INTO PERIPHERAL VEIN, PERCUTANEOUS APPROACH: ICD-10-PCS | Performed by: EMERGENCY MEDICINE

## 2018-05-05 NOTE — PDOC
*Physical Exam





- Vital Signs


 Last Vital Signs











Temp Pulse Resp BP Pulse Ox


 


 98.6 F   62   19   124/65   99 


 


 05/05/18 15:45  05/05/18 15:45  05/05/18 15:45  05/05/18 15:45  05/05/18 15:45














<Noemy Izaguirre - Last Filed: 05/05/18 21:39>





- Vital Signs


 Last Vital Signs











Temp Pulse Resp BP Pulse Ox


 


 98.6 F   62   19   124/65   99 


 


 05/05/18 15:45  05/05/18 15:45  05/05/18 15:45  05/05/18 15:45  05/05/18 15:45














<Antoine Galarza - Last Filed: 05/05/18 21:49>





ED Treatment Course





- LABORATORY


CBC & Chemistry Diagram: 


 05/05/18 17:46





 05/05/18 20:08





- ADDITIONAL ORDERS


Additional order review: 


 Laboratory  Results











  05/05/18 05/05/18 05/05/18





  20:08 19:00 17:50


 


Sodium  143  Cancelled 


 


Potassium  3.9  Cancelled 


 


Chloride  108 H  Cancelled 


 


Carbon Dioxide  24  Cancelled 


 


Anion Gap  11  Cancelled 


 


BUN  11  Cancelled 


 


Creatinine  0.6  Cancelled 


 


Creat Clearance w eGFR  > 60  Cancelled 


 


Random Glucose  104  Cancelled 


 


Calcium  9.5  Cancelled 


 


Total Bilirubin  0.3  D  Cancelled 


 


AST  17  Cancelled 


 


ALT  12  Cancelled 


 


Alkaline Phosphatase  73  Cancelled 


 


Total Protein  7.4  Cancelled 


 


Albumin  3.7  Cancelled 


 


Stool Occult Blood    Negative














  05/05/18





  17:46


 


Sodium  Cancelled


 


Potassium  Cancelled


 


Chloride  Cancelled


 


Carbon Dioxide  Cancelled


 


Anion Gap  Cancelled


 


BUN  Cancelled


 


Creatinine  Cancelled


 


Creat Clearance w eGFR  Cancelled


 


Random Glucose  Cancelled


 


Calcium  Cancelled


 


Total Bilirubin  Cancelled


 


AST  Cancelled


 


ALT  Cancelled


 


Alkaline Phosphatase  Cancelled


 


Total Protein  Cancelled


 


Albumin  Cancelled


 


Stool Occult Blood 








 











  05/05/18





  17:46


 


RBC  3.92


 


MCV  87.3


 


MCHC  33.3


 


RDW  16.1 H


 


MPV  9.6


 


Neutrophils %  No Result Required.


 


Lymphocytes %  No Result Required.














- Medications


Given in the ED: 


ED Medications














Discontinued Medications














Generic Name Dose Route Start Last Admin





  Trade Name Freq  PRN Reason Stop Dose Admin


 


Famotidine  20 mg in 12 mls @ 144 mls/hr  05/05/18 17:10  05/05/18 18:35





  Pepcid 20 Mg/12 Ml Push  IVPUSH  05/05/18 17:14  144 mls/hr





  ONCE ONE   Administration


 


Sucralfate  1 gm  05/05/18 19:45  05/05/18 20:26





  Carafate Oral Suspension -  PO  05/05/18 19:46  1 gm





  ONCE ONE   Administration














<Noemy Izaguirre - Last Filed: 05/05/18 21:39>





- LABORATORY


CBC & Chemistry Diagram: 


 05/05/18 17:46





 05/05/18 20:08





- ADDITIONAL ORDERS


Additional order review: 


 Laboratory  Results











  05/05/18 05/05/18





  17:50 17:46


 


Sodium   Cancelled


 


Potassium   Cancelled


 


Chloride   Cancelled


 


Carbon Dioxide   Cancelled


 


Anion Gap   Cancelled


 


BUN   Cancelled


 


Creatinine   Cancelled


 


Creat Clearance w eGFR   Cancelled


 


Random Glucose   Cancelled


 


Calcium   Cancelled


 


Total Bilirubin   Cancelled


 


AST   Cancelled


 


ALT   Cancelled


 


Alkaline Phosphatase   Cancelled


 


Total Protein   Cancelled


 


Albumin   Cancelled


 


Stool Occult Blood  Negative 








 











  05/05/18





  17:46


 


RBC  3.92


 


MCV  87.3


 


MCHC  33.3


 


RDW  16.1 H


 


MPV  9.6


 


Neutrophils %  No Result Required.


 


Lymphocytes %  No Result Required.














- Medications


Given in the ED: 


ED Medications














Discontinued Medications














Generic Name Dose Route Start Last Admin





  Trade Name Freq  PRN Reason Stop Dose Admin


 


Famotidine  20 mg in 12 mls @ 144 mls/hr  05/05/18 17:10  05/05/18 18:35





  Pepcid 20 Mg/12 Ml Push  IVPUSH  05/05/18 17:14  144 mls/hr





  ONCE ONE   Administration














<Antoine Galarza - Last Filed: 05/05/18 21:49>





Medical Decision Making





- Medical Decision Making


 Patient signed out in stable condition pending CMP and FOBT which are normal 

and patient feels much better after sucrasulfate. Passed PO challenge. Will DC 

home with sucrasulfate prescription and will have them call Dr. Granda for 

earlier follow up.


05/05/18 21:43











<Antoine Galarza - Last Filed: 05/05/18 21:49>





*DC/Admit/Observation/Transfer





- Discharge Dispostion


Admit: No





<Noemy Izaguirre - Last Filed: 05/05/18 21:39>





<Antoine Galarza - Last Filed: 05/05/18 21:49>


Diagnosis at time of Disposition: 


GERD (gastroesophageal reflux disease)


Qualifiers:


 Esophagitis presence: esophagitis presence not specified Qualified Code(s): 

K21.9 - Gastro-esophageal reflux disease without esophagitis








- Discharge Dispostion


Disposition: HOME


Condition at time of disposition: Improved





- Prescriptions


Prescriptions: 


Pantoprazole Sodium [Protonix -] 40 mg PO BID #60 tablet.ec


Sucralfate [Carafate -] 1 gm PO BID #30 tablet





- Referrals


Referrals: 


Radha Kang MD [Primary Care Provider] - 


Chung Granda MD [Staff Physician] - 





- Patient Instructions


Printed Discharge Instructions:  Acute Abdomen


Additional Instructions: 


Please use the new medicine twice a day until you see Dr. Granda. Please call 

Dr. Granda and let him know you were in the Emergency Department and you would 

like to set up an earlier appointment. Please return if the pain isn't better 

or worsens despite the use of your new medication.


Print Language: Indonesian





- Post Discharge Activity

## 2018-05-05 NOTE — PDOC
History of Present Illness





- General


Chief Complaint: Pain, Acute


Stated Complaint: ABDOMINAL PAIN


Time Seen by Provider: 05/05/18 16:22


History Source: Patient


Exam Limitations: No Limitations





- History of Present Illness


Initial Comments: 





05/05/18 18:16


78F with pmh of Skin CA (earlobe), CAD, diverticulitis, HTN, GERD, Hemorrhoids, 

hypercholesterolemia, afib (on coumadin), HLD,  presents to the ED for 

continuing complaint of abdominal pain dyspepsia and nausea for the past 2 

weeks. 


Saw Dr. Granda, gastroenterologist Reglan and pantoprazole which has helped 

relieve some of the pain. She describes the abdominal pain as ranging from mild 

to moderate, without radiation. She reports that eating exacerbates her pain in 

addition to have a decreased appetite, resulting in weight loss. 


 She notes that she has noticed some dark bloody stool which she mentioned to 

Dr. Granda when she last saw him recently and is scheduled to get a colonoscopy/

endoscopy on JUne 15th.





Past History





- Past Medical History


Allergies/Adverse Reactions: 


 Allergies











Allergy/AdvReac Type Severity Reaction Status Date / Time


 


Penicillins AdvReac Mild Itching Verified 05/05/18 15:45











Home Medications: 


Ambulatory Orders





Sotalol HCl [Betapace -] 80 mg PO BID #28 tablet 10/19/14 


Valsartan [Diovan] 160 mg PO DAILY #14 tablet 07/24/15 


Diltiazem Cd [Cardizem Cd -] 360 mg PO DAILY #30 cap.cd.24h 01/05/17 


Pravastatin Sodium [Pravachol -] 40 mg PO HS 05/10/17 


Apixaban [Eliquis -] 5 mg PO BID  tablet 03/14/18 


Hydrocodone/Acetaminophen [Hydrocodone-Acetamin 5-325 mg] 1 - 2 tab PO TID PRN #

40 tablet MDD 6 04/05/18 


Esomeprazole Magnesium [Nexium 24Hr] 20 mg PO DAILY #30 capsule. 04/17/18 


Pantoprazole Sodium [Protonix -] 40 mg PO BID #60 tablet.ec 05/05/18 








Anemia: No


Asthma: No


Cancer: Yes (SKIN CA- EARLOBE)


Cardiac Disorders: Yes (AFIB,)


CVA: No


COPD: No


CHF: No


Dementia: No


Diabetes: No


GI Disorders: Yes (GASTRITIS, diverticlitis)


 Disorders: No


HTN: Yes


Hypercholesterolemia: Yes


Liver Disease:  (FATTY LIVER)


Seizures: No


Thyroid Disease: No





- Surgical History


Abdominal Surgery: No


Appendectomy: No


Cardiac Surgery: Yes (CABG)


Cholecystectomy: No


Lung Surgery: No


Neurologic Surgery: No


Orthopedic Surgery: Yes





- Family Disease History


Family Disease History: Heart Disease: Father





- Immunization History


Immunization Up to Date: Yes





- Suicide/Smoking/Psychosocial Hx


Smoking Status: Yes


Smoking History: Former smoker


Have you smoked in the past 12 months: Yes


Number of Cigarettes Smoked Daily: 0


If you are a former smoker, when did you quit?: 25 yrs


Information on smoking cessation initiated: No


Hx Alcohol Use: No


Drug/Substance Use Hx: No


Substance Use Type: None


Hx Substance Use Treatment: No





**Review of Systems





- Review of Systems


Able to Perform ROS?: Yes


Is the patient limited English proficient: No


Constitutional: No: Symptoms Reported


HEENTM: No: Symptoms Reported


Respiratory: No: Symptoms reported


Cardiac (ROS): No: Symptoms Reported


ABD/GI: Yes: Nausea, Poor Appetite, Abdominal cramping


: No: Symptoms Reported


Musculoskeletal: No: Symptoms Reported


Integumentary: No: Symptoms Reported


All Other Systems: Reviewed and Negative





*Physical Exam





- Vital Signs


 Last Vital Signs











Temp Pulse Resp BP Pulse Ox


 


 98.6 F   62   19   124/65   99 


 


 05/05/18 15:45  05/05/18 15:45  05/05/18 15:45  05/05/18 15:45  05/05/18 15:45














- Physical Exam


General Appearance: Yes: Nourished, Appropriately Dressed.  No: Apparent 

Distress


HEENT: positive: EOMI, DEWEY, Normal ENT Inspection


Respiratory/Chest: positive: Lungs Clear, Normal Breath Sounds.  negative: 

Chest Tender, Respiratory Distress


Cardiovascular: positive: Regular Rhythm, Regular Rate, S1, S2


Gastrointestinal/Abdominal: positive: Normal Bowel Sounds, Tender (diffusely), 

Soft


Extremity: positive: Normal Capillary Refill, Normal Inspection, Normal Range 

of Motion


Integumentary: positive: Normal Color, Dry, Warm


Neurologic: positive: Fully Oriented, Alert, Normal Mood/Affect, Normal Response

, Motor Strength 5/5





ED Treatment Course





- LABORATORY


CBC & Chemistry Diagram: 


 05/05/18 17:46





 05/05/18 17:46





Medical Decision Making





- Medical Decision Making





05/05/18 18:24


78F with chronic dyspesia presents continued symptoms. 





05/05/18 18:25


basic labs as potential concern for anemia, and dispo accordingly


05/05/18 19:13


Patient signed out to Dr. Galarza





*DC/Admit/Observation/Transfer


Diagnosis at time of Disposition: 


 Altered mental status








- Prescriptions


Prescriptions: 


Pantoprazole Sodium [Protonix -] 40 mg PO BID #60 tablet.ec





- Referrals


Referrals: 


Radha Kang MD [Primary Care Provider] - 





- Patient Instructions





- Post Discharge Activity

## 2018-05-05 NOTE — PDOC
Attending Attestation





- HPI


HPI: 





05/05/18 17:31


The patient is a 78 year old female, with a significant past medical history of 

Skin CA (earlobe), CAD, diverticulitis, HTN, GERD, Hemrrhoids, 

hypercholesterolemia, afib (on coumadin), HLD, who presents to the emergency 

department complaining of abdominal pain and nausea for the past 2 weeks. She 

reports that she saw a GI (Dr. Chung Granda) who prescribed her medicine, which 

has helped relieve some of the pain. She describes the abdominal pain as 

ranging from mild to moderate, without radiation. She reports that eating 

exacerbates her pain which has caused her to have decreased PO intake. She 

notes that she has noticed some blood/dark stools which she mentioned to Dr. Granda when she last saw him recently. She reports that her next appointment 

with Dr. Granda is Esther 15 of this year and has a scheduled endoscopy coming up

, as per patient. 





The patient denies chest pain, shortness of breath, headache or dizziness. 

Denies fever, chills, vomiting, diarrhea and constipation. Denies dysuria, 

frequency, urgency and hematuria.





Allergies: Penicillins 


Past surgical history: Pacemaker (2011)


Social history: Nonsmoker. Denies EtOH use and recreational drug use. 


Primary Care Physician: 








- Physicial Exam


PE: 





05/05/18 17:31





GENERAL: Awake, alert, and fully oriented, in no acute distress


HEAD: No signs of trauma, normocephalic, atraumatic 


EYES: PERRLA, EOMI, sclera anicteric, conjunctiva clear


ENT: Auricles normal inspection, hearing grossly normal, nares patent, 

oropharynx clear without


exudates. Moist mucosa


NECK: Normal ROM, supple, no lymphadenopathy, JVD, or masses


LUNGS: No distress, speaks full sentences, clear to auscultation bilaterally 


HEART: Regular rate and rhythm, normal S1 and S2, no murmurs, rubs or gallops, 

peripheral pulses normal and equal bilaterally. 


ABDOMEN: (+) Epigastric tenderness. Soft, normoactive bowel sounds.  No guarding

, no rebound.  No masses


EXTREMITIES : Normal inspection, Normal range of motion, no edema.  No clubbing 

or cyanosis. 


NEUROLOGICAL: Cranial nerves II through XII grossly intact.  Normal speech, 

normal gait, no focal sensorimotor deficits 


SKIN: Warm, Dry, normal turgor, no rashes or lesions noted








<Jace Ruvalcaba - Last Filed: 05/05/18 17:31>





- Medical Decision Making





05/05/18 17:43


Pt presents to the ED complaining of persistent epigastric discomfort despite 

reglan and protonix prescribed by Dr. Granda.  Family is and patient are also 

concerned because she has had a 12 pound unintentional weight loss.  Abdomen is 

non tender.  PAtient has colonoscopy appointment in June.  Also complaining of 

intermittent blood streaked stool.  Will labs, likely discharge home if labs 

are within normal limits.  Will treat persistent nausea with zofran. 





<Madina Rose - Last Filed: 05/05/18 17:51>

## 2018-09-01 ENCOUNTER — HOSPITAL ENCOUNTER (EMERGENCY)
Dept: HOSPITAL 74 - JER | Age: 78
Discharge: HOME | End: 2018-09-01
Payer: COMMERCIAL

## 2018-09-01 VITALS — TEMPERATURE: 98.2 F | SYSTOLIC BLOOD PRESSURE: 130 MMHG | HEART RATE: 65 BPM | DIASTOLIC BLOOD PRESSURE: 66 MMHG

## 2018-09-01 VITALS — BODY MASS INDEX: 24.1 KG/M2

## 2018-09-01 DIAGNOSIS — Z95.0: ICD-10-CM

## 2018-09-01 DIAGNOSIS — Z88.0: ICD-10-CM

## 2018-09-01 DIAGNOSIS — Z95.1: ICD-10-CM

## 2018-09-01 DIAGNOSIS — I25.10: ICD-10-CM

## 2018-09-01 DIAGNOSIS — E78.00: ICD-10-CM

## 2018-09-01 DIAGNOSIS — Z79.01: ICD-10-CM

## 2018-09-01 DIAGNOSIS — K76.0: ICD-10-CM

## 2018-09-01 DIAGNOSIS — R11.0: Primary | ICD-10-CM

## 2018-09-01 DIAGNOSIS — I48.91: ICD-10-CM

## 2018-09-01 DIAGNOSIS — I10: ICD-10-CM

## 2018-09-01 DIAGNOSIS — Z85.828: ICD-10-CM

## 2018-09-01 LAB
ALBUMIN SERPL-MCNC: 3.6 G/DL (ref 3.4–5)
ALP SERPL-CCNC: 75 U/L (ref 45–117)
ALT SERPL-CCNC: 15 U/L (ref 12–78)
ANION GAP SERPL CALC-SCNC: 6 MMOL/L (ref 8–16)
APPEARANCE UR: CLEAR
AST SERPL-CCNC: 20 U/L (ref 15–37)
BASOPHILS # BLD: 1.3 % (ref 0–2)
BILIRUB SERPL-MCNC: 0.2 MG/DL (ref 0.2–1)
BILIRUB UR STRIP.AUTO-MCNC: NEGATIVE MG/DL
BUN SERPL-MCNC: 15 MG/DL (ref 7–18)
CALCIUM SERPL-MCNC: 9.3 MG/DL (ref 8.5–10.1)
CHLORIDE SERPL-SCNC: 106 MMOL/L (ref 98–107)
CO2 SERPL-SCNC: 28 MMOL/L (ref 21–32)
COLOR UR: COLORLESS
CREAT SERPL-MCNC: 0.7 MG/DL (ref 0.55–1.02)
DEPRECATED RDW RBC AUTO: 16.1 % (ref 11.6–15.6)
EOSINOPHIL # BLD: 3.1 % (ref 0–4.5)
GLUCOSE SERPL-MCNC: 89 MG/DL (ref 74–106)
HCT VFR BLD CALC: 35.3 % (ref 32.4–45.2)
HGB BLD-MCNC: 11.4 GM/DL (ref 10.7–15.3)
KETONES UR QL STRIP: NEGATIVE
LEUKOCYTE ESTERASE UR QL STRIP.AUTO: NEGATIVE
LIPASE SERPL-CCNC: 103 U/L (ref 73–393)
LYMPHOCYTES # BLD: 19 % (ref 8–40)
MCH RBC QN AUTO: 27.8 PG (ref 25.7–33.7)
MCHC RBC AUTO-ENTMCNC: 32.5 G/DL (ref 32–36)
MCV RBC: 85.8 FL (ref 80–96)
MONOCYTES # BLD AUTO: 10.4 % (ref 3.8–10.2)
NEUTROPHILS # BLD: 66.2 % (ref 42.8–82.8)
NITRITE UR QL STRIP: NEGATIVE
PH UR: 7 [PH] (ref 5–8)
PLATELET # BLD AUTO: 248 K/MM3 (ref 134–434)
PMV BLD: 9.1 FL (ref 7.5–11.1)
POTASSIUM SERPLBLD-SCNC: 4.6 MMOL/L (ref 3.5–5.1)
PROT SERPL-MCNC: 7.4 G/DL (ref 6.4–8.2)
PROT UR QL STRIP: NEGATIVE
PROT UR QL STRIP: NEGATIVE
RBC # BLD AUTO: 4.11 M/MM3 (ref 3.6–5.2)
SODIUM SERPL-SCNC: 140 MMOL/L (ref 136–145)
SP GR UR: 1 (ref 1–1.03)
UROBILINOGEN UR STRIP-MCNC: NEGATIVE MG/DL (ref 0.2–1)
WBC # BLD AUTO: 9.7 K/MM3 (ref 4–10)

## 2018-09-01 PROCEDURE — 3E033GC INTRODUCTION OF OTHER THERAPEUTIC SUBSTANCE INTO PERIPHERAL VEIN, PERCUTANEOUS APPROACH: ICD-10-PCS

## 2018-09-01 NOTE — PDOC
History of Present Illness





- General


History Source: Patient


Exam Limitations: No Limitations





- History of Present Illness


Initial Comments: 





09/01/18 13:48


The patient is a 52-year-old female present to the emergency department with 

abdominal discomfort. As per daughter, the patient presents with abdominal 

discomfort, associated with nausea, weakness and an episode of dark red bloody 

diarrhea. The patient reports on 04/20/2018, she had an endoscopy done by Dr. Granda, was diagnosed with diverticulitis and prescribed  250 mg of 

metronidazole. The daughter reports, initially the patient was compliant with 

the medication, but secondary to the presents to weakness and abdominal 

discomfort, the patient stopped taking medicine. The daughter reports for the 

past 3 weeks, the patients been experiencing decreased appetite, dry mouth, 

weakness, and nausea, that worsens today. The daughter reports history of 

gastritis, states the discomfort is similar to the previous flare-ups. The 

daughter reports recent travel to [place] denies any out of the country visits. 





Denies chest pain, cough, fever, abdominal pain, fever, chills chest pain, 

shortness of breath, constipation, or vomiting. 





PMHx: HTN, CAD s/p CABG, sick sinus syndrome s/p ppm, A fib (on coumadin), HLD


Allergies: Penicillins 


SHx: Former smoker. No alcohol or recreational drug use reported. 


PSHx: right shoulder arthroscopy, decompression, distal clavicle excision by 

Dr. George on 04/05/2018, Permanent Pacemaker ([id number]) 


PCP: Radha Jaramillo














<Dyan Daugherty - Last Filed: 09/01/18 14:32>





<Maritza Haynes - Last Filed: 09/02/18 08:46>





- General


Chief Complaint: Nausea


Stated Complaint: NAUSEA


Time Seen by Provider: 09/01/18 12:53





Past History





<Dyan Daugherty - Last Filed: 09/01/18 14:32>





- Past Medical History


Anemia: No


Asthma: No


Cancer: Yes (SKIN CA- EARLOBE)


Cardiac Disorders: Yes (AFIB,)


CVA: No


COPD: No


CHF: No


Dementia: No


Diabetes: No


GI Disorders: Yes (gastritis)


 Disorders: No


HTN: Yes


Hypercholesterolemia: Yes


Liver Disease:  (FATTY LIVER)


Seizures: No


Thyroid Disease: No





- Surgical History


Abdominal Surgery: No


Appendectomy: No


Cardiac Surgery: Yes (CABG)


Cholecystectomy: No


Lung Surgery: No


Neurologic Surgery: No


Orthopedic Surgery: Yes





- Family Disease History


Family Disease History: Heart Disease: Father





- Immunization History


Immunization Up to Date: Yes





- Suicide/Smoking/Psychosocial Hx


Smoking Status: Yes


Smoking History: Never smoked


Have you smoked in the past 12 months: Yes


Number of Cigarettes Smoked Daily: 0


If you are a former smoker, when did you quit?: 25 yrs


Information on smoking cessation initiated: No


Hx Alcohol Use: No


Drug/Substance Use Hx: No


Substance Use Type: None


Hx Substance Use Treatment: No





<Maritza Haynes - Last Filed: 09/02/18 08:46>





- Past Medical History


Allergies/Adverse Reactions: 


 Allergies











Allergy/AdvReac Type Severity Reaction Status Date / Time


 


Penicillins AdvReac Mild Itching Verified 09/01/18 11:56











Home Medications: 


Ambulatory Orders





Sotalol HCl [Betapace -] 80 mg PO BID #28 tablet 10/19/14 


Valsartan [Diovan] 160 mg PO DAILY #14 tablet 07/24/15 


Diltiazem Cd [Cardizem Cd -] 360 mg PO DAILY #30 cap.cd.24h 01/05/17 


Pravastatin Sodium [Pravachol -] 40 mg PO HS 05/10/17 


Apixaban [Eliquis -] 5 mg PO BID  tablet 03/14/18 


Hydrocodone/Acetaminophen [Hydrocodone-Acetamin 5-325 mg] 1 - 2 tab PO TID PRN #

40 tablet MDD 6 04/05/18 


Esomeprazole Magnesium [Nexium 24Hr] 20 mg PO DAILY #30 capsule. 04/17/18 


Pantoprazole Sodium [Protonix -] 40 mg PO BID #60 tablet.ec 05/05/18 


Sucralfate Oral Suspension [Carafate Oral Suspension -] 1 gm PO BID #1 bottle 05 /05/18 


Sucralfate [Carafate -] 1 gm PO BID #30 tablet 05/05/18 











**Review of Systems





- Review of Systems


Able to Perform ROS?: Yes


Comments:: 





09/01/18 13:48


GENERAL/CONSTITUTIONAL: (+) Weakness.  No fever or chills. 


HEAD, EYES, EARS, NOSE AND THROAT: No change in vision. No ear pain or 

discharge. No sore throat.


CARDIOVASCULAR: No chest pain or shortness of breath.


RESPIRATORY: No cough, wheezing, or hemoptysis.


GASTROINTESTINAL: (+) nausea and diarrhea. No vomiting or constipation.


GENITOURINARY: No dysuria, frequency, or change in urination.


MUSCULOSKELETAL: No joint or muscle swelling or pain. No neck or back pain.


SKIN: No rash


NEUROLOGIC: No headache, vertigo, loss of consciousness, or change in strength/

sensation.


ENDOCRINE: No increased thirst. No abnormal weight change.


HEMATOLOGIC/LYMPHATIC: No anemia, easy bleeding, or history of blood clots.


ALLERGIC/IMMUNOLOGIC: No hives or skin allergy.








<Dyan Daugherty - Last Filed: 09/01/18 14:32>





*Physical Exam





- Vital Signs


 Last Vital Signs











Temp Pulse Resp BP Pulse Ox


 


 98.2 F   65   18   130/66   99 


 


 09/01/18 11:54  09/01/18 11:54  09/01/18 11:54  09/01/18 11:54  09/01/18 11:54














- Physical Exam


Comments: 





09/01/18 14:32


GENERAL: Awake, alert, and fully oriented, in no acute distress


HEAD: No signs of trauma


EYES: PERRLA, EOMI, sclera anicteric, conjunctiva clear


ENT: (+) Dry mucosa. Auricles normal inspection, hearing grossly normal, nares 

patent, oropharynx clear without exudates.


NECK: Normal ROM, supple, no lymphadenopathy, JVD, or masses


LUNGS: Breath sounds equal, clear to auscultation bilaterally.  No wheezes, and 

no crackles


HEART: Regular rate and rhythm, normal S1 and S2, no murmurs, rubs or gallops


ABDOMEN: Soft, nontender, normoactive bowel sounds.  No guarding, no rebound.  

No masses


EXTREMITIES: Normal range of motion, no edema.  No clubbing or cyanosis. No 

cords, erythema, or tenderness


NEUROLOGICAL: Cranial nerves II through XII grossly intact.  Normal speech, 

normal gait


SKIN: Warm, Dry, normal turgor, no rashes or lesions noted.








<Dyan Daugherty - Last Filed: 09/01/18 14:32>





- Vital Signs


 Last Vital Signs











Temp Pulse Resp BP Pulse Ox


 


 98.2 F   65   18   130/66   99 


 


 09/01/18 11:54  09/01/18 11:54  09/01/18 11:54  09/01/18 11:54  09/01/18 11:54














<Maritza Hayens - Last Filed: 09/02/18 08:46>





ED Treatment Course





- LABORATORY


CBC & Chemistry Diagram: 


 09/01/18 13:15





 09/01/18 13:15





- ADDITIONAL ORDERS


Additional order review: 


 











  09/01/18





  13:15


 


RBC  4.11


 


MCV  85.8


 


MCHC  32.5


 


RDW  16.1 H


 


MPV  9.1


 


Neutrophils %  66.2


 


Lymphocytes %  19.0


 


Monocytes %  10.4 H


 


Eosinophils %  3.1  D


 


Basophils %  1.3














<Dyan Daugherty - Last Filed: 09/01/18 14:32>





- LABORATORY


CBC & Chemistry Diagram: 


 09/01/18 13:15





 09/01/18 13:15





<Maritza Haynes - Last Filed: 09/02/18 08:46>





Medical Decision Making





- Medical Decision Making





Labs, UA, and CT all obtained, no acute findings. Stable for DC home. 








<Maritza Haynes - Last Filed: 09/02/18 08:46>





*DC/Admit/Observation/Transfer





- Attestations


Scribe Attestion: 





09/01/18 13:51





Documentation prepared by Dyan Daugherty, acting as medical scribe for Maritza Haynes MD. 





<Dyan Daugherty - Last Filed: 09/01/18 14:32>





- Discharge Dispostion


Decision to Admit order: No





<Maritza aHynes - Last Filed: 09/02/18 08:46>


Diagnosis at time of Disposition: 


 Nausea








- Discharge Dispostion


Disposition: HOME


Condition at time of disposition: Improved





- Referrals


Referrals: 


Radha Kang MD [Primary Care Provider] - 





- Patient Instructions


Printed Discharge Instructions:  DI for Nausea -- Adult





- Post Discharge Activity

## 2018-09-03 NOTE — EKG
Test Reason : 

Blood Pressure : ***/*** mmHG

Vent. Rate : 060 BPM     Atrial Rate : 060 BPM

   P-R Int : 282 ms          QRS Dur : 138 ms

    QT Int : 538 ms       P-R-T Axes : 069 -21 141 degrees

   QTc Int : 538 ms

 

Atrial-paced rhythm with prolonged AV conduction

LEFT BUNDLE BRANCH BLOCK

ABNORMAL ECG

WHEN COMPARED WITH ECG OF 17-APR-2018 14:11,

T WAVE INVERSION LESS EVIDENT IN LATERAL LEADS

Confirmed by YENNY TRINIDAD MD ([id number]) on 9/3/2018 1:55:19 PM

 

Referred By:             Confirmed By:YENNY TRINIDAD MD

## 2018-10-30 ENCOUNTER — HOSPITAL ENCOUNTER (EMERGENCY)
Dept: HOSPITAL 74 - JER | Age: 78
Discharge: HOME | End: 2018-10-30
Payer: COMMERCIAL

## 2018-10-30 VITALS — SYSTOLIC BLOOD PRESSURE: 157 MMHG | DIASTOLIC BLOOD PRESSURE: 54 MMHG

## 2018-10-30 VITALS — TEMPERATURE: 98.2 F | HEART RATE: 60 BPM

## 2018-10-30 VITALS — BODY MASS INDEX: 27.1 KG/M2

## 2018-10-30 DIAGNOSIS — I48.91: ICD-10-CM

## 2018-10-30 DIAGNOSIS — R31.9: ICD-10-CM

## 2018-10-30 DIAGNOSIS — Z95.1: ICD-10-CM

## 2018-10-30 DIAGNOSIS — I25.10: Primary | ICD-10-CM

## 2018-10-30 DIAGNOSIS — I10: ICD-10-CM

## 2018-10-30 DIAGNOSIS — Z79.01: ICD-10-CM

## 2018-10-30 LAB
ALBUMIN SERPL-MCNC: 3.6 G/DL (ref 3.4–5)
ALP SERPL-CCNC: 82 U/L (ref 45–117)
ALT SERPL-CCNC: 14 U/L (ref 13–61)
ANION GAP SERPL CALC-SCNC: 6 MMOL/L (ref 8–16)
APPEARANCE UR: CLEAR
AST SERPL-CCNC: 19 U/L (ref 15–37)
BASOPHILS # BLD: 1.3 % (ref 0–2)
BILIRUB SERPL-MCNC: 0.2 MG/DL (ref 0.2–1)
BILIRUB UR STRIP.AUTO-MCNC: NEGATIVE MG/DL
BUN SERPL-MCNC: 13 MG/DL (ref 7–18)
CALCIUM SERPL-MCNC: 8.9 MG/DL (ref 8.5–10.1)
CHLORIDE SERPL-SCNC: 108 MMOL/L (ref 98–107)
CO2 SERPL-SCNC: 28 MMOL/L (ref 21–32)
COLOR UR: COLORLESS
CREAT SERPL-MCNC: 0.7 MG/DL (ref 0.55–1.3)
DEPRECATED RDW RBC AUTO: 15.9 % (ref 11.6–15.6)
EOSINOPHIL # BLD: 2.8 % (ref 0–4.5)
EPITH CASTS URNS QL MICRO: (no result) /HPF
GLUCOSE SERPL-MCNC: 86 MG/DL (ref 74–106)
HCT VFR BLD CALC: 34.2 % (ref 32.4–45.2)
HGB BLD-MCNC: 10.9 GM/DL (ref 10.7–15.3)
KETONES UR QL STRIP: NEGATIVE
LEUKOCYTE ESTERASE UR QL STRIP.AUTO: NEGATIVE
LYMPHOCYTES # BLD: 23.3 % (ref 8–40)
MCH RBC QN AUTO: 27.4 PG (ref 25.7–33.7)
MCHC RBC AUTO-ENTMCNC: 32 G/DL (ref 32–36)
MCV RBC: 85.6 FL (ref 80–96)
MONOCYTES # BLD AUTO: 11.3 % (ref 3.8–10.2)
NEUTROPHILS # BLD: 61.3 % (ref 42.8–82.8)
NITRITE UR QL STRIP: NEGATIVE
PH UR: 7 [PH] (ref 5–8)
PLATELET # BLD AUTO: 236 K/MM3 (ref 134–434)
PMV BLD: 9.1 FL (ref 7.5–11.1)
POTASSIUM SERPLBLD-SCNC: 4 MMOL/L (ref 3.5–5.1)
PROT SERPL-MCNC: 7.1 G/DL (ref 6.4–8.2)
PROT UR QL STRIP: NEGATIVE
PROT UR QL STRIP: NEGATIVE
RBC # BLD AUTO: 4 M/MM3 (ref 3.6–5.2)
SODIUM SERPL-SCNC: 142 MMOL/L (ref 136–145)
SP GR UR: 1 (ref 1.01–1.03)
UROBILINOGEN UR STRIP-MCNC: NEGATIVE MG/DL (ref 0.2–1)
WBC # BLD AUTO: 7.7 K/MM3 (ref 4–10)

## 2018-10-30 NOTE — EKG
Test Reason : 

Blood Pressure : ***/*** mmHG

Vent. Rate : 060 BPM     Atrial Rate : 060 BPM

   P-R Int : 284 ms          QRS Dur : 142 ms

    QT Int : 532 ms       P-R-T Axes : 076 -18 071 degrees

   QTc Int : 532 ms

 

Atrial-paced rhythm with prolonged AV conduction

LEFT BUNDLE BRANCH BLOCK

ABNORMAL ECG

WHEN COMPARED WITH ECG OF 01-SEP-2018 13:32,

NO SIGNIFICANT CHANGE WAS FOUND

Confirmed by MD PREETI, EMILIANA ([id number]) on 10/30/2018 4:12:32 PM

 

Referred By:             Confirmed By:EMILIANA ÁLVAREZ MD

## 2018-10-30 NOTE — PDOC
History of Present Illness





- General


Chief Complaint: Blood Pressure Problem


Stated Complaint: BLOOD PRESSURE PROBLEM


Time Seen by Provider: 10/30/18 12:21


History Source: Patient, Family (son)


Exam Limitations: Language Barrier





- History of Present Illness


Initial Comments: 





10/30/18 12:37


*son translated for patient





Pt is a 79yo f with PMH of CAD s/p CABG six years ago, HTN, Afib presenting to 

ED with son for evaluation of high blood pressure. 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 BP was measured at school 3 

times and it was 180s/100s. Pt was advised to go to the ED. Pt says she never 

had issues with blood pressure in the past. She admits to feeling tired. She 

denies chest pain, SOB, back pain, neck pain, headache, changes in vision, 

lightheadedness, dizziness, abdominal pain, n/v/d, weakness, numbness, 

tingling. 





PMD: Innabi


Cardio: Franciscone


PMH: see hpi


PSH: CABG


Meds: see med rec


Social: denies


Allergies: PCN








Past History





- Past Medical History


Allergies/Adverse Reactions: 


 Allergies











Allergy/AdvReac Type Severity Reaction Status Date / Time


 


Penicillins AdvReac Mild Itching Verified 10/30/18 12:13











Home Medications: 


Ambulatory Orders





Sotalol HCl [Betapace -] 80 mg PO BID #28 tablet 10/19/14 


Valsartan [Diovan] 160 mg PO DAILY #14 tablet 07/24/15 


Diltiazem Cd [Cardizem Cd -] 360 mg PO DAILY #30 cap.cd.24h 01/05/17 


Pravastatin Sodium [Pravachol -] 40 mg PO HS 05/10/17 


Apixaban [Eliquis -] 5 mg PO BID  tablet 03/14/18 


Hydrocodone/Acetaminophen [Hydrocodone-Acetamin 5-325 mg] 1 - 2 tab PO TID PRN #

40 tablet MDD 6 04/05/18 


Esomeprazole Magnesium [Nexium 24Hr] 20 mg PO DAILY #30 capsule.dr 04/17/18 


Pantoprazole Sodium [Protonix -] 40 mg PO BID #60 tablet.ec 05/05/18 


Sucralfate Oral Suspension [Carafate Oral Suspension -] 1 gm PO BID #1 bottle 05 /05/18 


Sucralfate [Carafate -] 1 gm PO BID #30 tablet 05/05/18 








Anemia: No


Asthma: No


Cancer: Yes (SKIN CA- EARLOBE)


Cardiac Disorders: Yes (AFIB,)


CVA: No


COPD: No


CHF: No


Dementia: No


Diabetes: No


GI Disorders: Yes (gastritis)


 Disorders: No


HTN: Yes


Hypercholesterolemia: Yes


Liver Disease:  (FATTY LIVER)


Seizures: No


Thyroid Disease: No





- Surgical History


Abdominal Surgery: No


Appendectomy: No


Cardiac Surgery: Yes (CABG)


Cholecystectomy: No


Lung Surgery: No


Neurologic Surgery: No


Orthopedic Surgery: Yes





- Family Disease History


Family Disease History: Heart Disease: Father





- Immunization History


Immunization Up to Date: Yes





- Suicide/Smoking/Psychosocial Hx


Smoking Status: Yes


Smoking History: Never smoked


Have you smoked in the past 12 months: Yes


Number of Cigarettes Smoked Daily: 0


If you are a former smoker, when did you quit?: 25 yrs ago


Information on smoking cessation initiated: No


Hx Alcohol Use: No


Drug/Substance Use Hx: No


Substance Use Type: None


Hx Substance Use Treatment: No





*Physical Exam





- Vital Signs


 Last Vital Signs











Temp Pulse Resp BP Pulse Ox


 


 98.2 F   60   18   119/56 L  90 L


 


 10/30/18 12:14  10/30/18 12:14  10/30/18 12:14  10/30/18 12:14  10/30/18 12:14














ED Treatment Course





- LABORATORY


CBC & Chemistry Diagram: 


 10/30/18 13:23





 10/30/18 13:23





Medical Decision Making





- Medical Decision Making





10/30/18 13:47


Pt is a 79yo f with PMH of CAD s/p CABG six years ago, HTN, Afib presenting to 

ED with son for evaluation of high blood pressure. + Tiredness. 


   Vitals: /56 (recheck 146/64) 98% RA, HR 60s


   PE: benign.


DDx: hypertensive urgency v. emergency, acs, cva, pna, uti





Pt bp normal in ED. because of history, will do EKG and labs. low suspicion for 

pna due to normal breath sounds and no fever, cough. 


Will collect UA for poss. uti. 


Not hypertensive urgency or emergency, pt systolic bp in 140s. 





EKG:


Labs: wnl. normal trop and CK. normal physical exam. Will recheck vitals. 





Vital recheck: BP 150s/54, HR 60 98% RA. Pt hemodynamically stable, has good 

follow up. can be dc home. no active complaints. Pt eating at time of 

discharge. Pt agreed to plan. Given return precautions. 





*DC/Admit/Observation/Transfer


Diagnosis at time of Disposition: 


 Blood pressure check








- Discharge Dispostion


Disposition: HOME


Condition at time of disposition: Good


Decision to Admit order: No





- Referrals


Referrals: 


Radha Kang MD [Primary Care Provider] - 





- Patient Instructions


Printed Discharge Instructions:  DI for High Blood Pressure, How to Monitor 

Your Blood Pressure at Home


Additional Instructions: 


Usted fue visto aqu hoy para la evaluacin de la presin arterial doris. Todas 

peterson pruebas fueron normales.





Por favor jesusita ashley aram con el Dr. Kang para ashley mayor evaluacin y manejo de 

la presin arterial. [place] peterson medicamentos segn las indicaciones.





Regrese a la esperanza de emergencias si: sanders presin arterial es doris (ms de 180/100

), tiene anita de arabella, tiene cambios en la visin, tiene dolor en el pecho

, se siente mareado o si se presenta algn sntoma nuevo.





Gricelda











You were seen here today for evaluation of high blood pressure. All of your 

tests were normal. 





Please make an appointment with Dr. Kang for further evaluation and 

management of blood pressure. Take your medications as directed. 





Come back to the emergency room if: your blood pressure is high (over 180/100), 

you have headaches, you have changes in vision, you have chest pain, you feel 

dizzy, or if any new concerning symptom develops.





Thank you


Print Language: English





- Post Discharge Activity

## 2018-10-30 NOTE — PDOC
Attending Attestation





- Resident


Resident Name: SariSaEdna





- ED Attending Attestation


I have performed the following: I have examined & evaluated the patient, The 

case was reviewed & discussed with the resident, I agree w/resident's findings 

& plan, Exceptions are as noted





- HPI


HPI: 





10/30/18 13:15


78y F hx of CABG(6 years ago), CAD, HTN, and AFib on eliquis, presents for 

evaluation of fatigue. 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 She reports a blood pressure of 180/100 three times, 

which prompted her visit to the emergency department. Patient denies chest pain

, shortness of breath, dizziness, palptiations, fever/chills, cough, diarrhea, 

melena, bpr, dysuria, frequency, foul smelling urine and headaches. Pt state 

she feels better currently. Upon arrival, the pts bp was normal (she took her 

bp meds around 8:30am)





pmd [employer]


card: shebaone








- Physicial Exam


PE: 





10/30/18 13:20


GENERAL: The patient is awake, alert, and fully oriented, Nontoxic - in no 

acute distress.


LUNGS: Breath sounds equal, clear to auscultation bilaterally.  No wheezes, no 

rhonchi, no rales.


HEART: Regular rate and rhythm, systolic murmur, no rub or gallop.


ABDOMEN: Soft, nontender,  No guarding, no rebound.  . No CVA tenderness


EXTREMITIES: Normal range of motion, 


NEUROLOGICAL: No facial assymetry, Normal speech, moving imelda 4 extrmitieis 

spontaneously and symmetriclly 


PSYCH: Normal mood, normal affect.


SKIN: Warm, Dry, normal turgor,





- Medical Decision Making





10/30/18 12:42


78y F hx of afib, cad, presents with fatigue. was found to be hypertensive at 

school. 


bp normal here





will ck ekg to screen for acs (though pt has no cp, sob or any other sypmtoms 

suggestive of AMI)


labs to screen for anemia, metabolic derangement


UA to screen for UTI





10/30/18 14:17


labs reviewed, wnl


UTI noted for +hematuria - but pt states this is chronic for her, and sh ehas 

had multiple workups without an answer.


will defer further workup and hav ept fu with PMD 





pt feels better, states she is hungry,


will dc wiht pmd fu


return precautions were discussed











**Heart Score/ECG Review





- ECG Impressions


Comment:: 





10/30/18 13:21


Twelve-lead EKG was performed and reviewed by me. 


Atrial paced rhythm


Left bundle-branch block


Rate of 60

## 2018-12-25 ENCOUNTER — HOSPITAL ENCOUNTER (INPATIENT)
Dept: HOSPITAL 74 - JER | Age: 78
LOS: 3 days | Discharge: HOME | DRG: 303 | End: 2018-12-28
Attending: FAMILY MEDICINE | Admitting: FAMILY MEDICINE
Payer: COMMERCIAL

## 2018-12-25 VITALS — BODY MASS INDEX: 24.9 KG/M2

## 2018-12-25 DIAGNOSIS — K57.90: ICD-10-CM

## 2018-12-25 DIAGNOSIS — Z95.0: ICD-10-CM

## 2018-12-25 DIAGNOSIS — I48.0: ICD-10-CM

## 2018-12-25 DIAGNOSIS — K76.0: ICD-10-CM

## 2018-12-25 DIAGNOSIS — I10: ICD-10-CM

## 2018-12-25 DIAGNOSIS — Z95.1: ICD-10-CM

## 2018-12-25 DIAGNOSIS — E78.00: ICD-10-CM

## 2018-12-25 DIAGNOSIS — R94.31: ICD-10-CM

## 2018-12-25 DIAGNOSIS — K64.9: ICD-10-CM

## 2018-12-25 DIAGNOSIS — K29.70: ICD-10-CM

## 2018-12-25 DIAGNOSIS — M19.011: ICD-10-CM

## 2018-12-25 DIAGNOSIS — I25.10: Primary | ICD-10-CM

## 2018-12-25 DIAGNOSIS — Z85.828: ICD-10-CM

## 2018-12-25 DIAGNOSIS — R07.89: ICD-10-CM

## 2018-12-25 LAB
ALBUMIN SERPL-MCNC: 3.4 G/DL (ref 3.4–5)
ALP SERPL-CCNC: 79 U/L (ref 45–117)
ALT SERPL-CCNC: 18 U/L (ref 13–61)
ANION GAP SERPL CALC-SCNC: 8 MMOL/L (ref 8–16)
APPEARANCE UR: CLEAR
APTT BLD: 34.9 SECONDS (ref 25.2–36.5)
AST SERPL-CCNC: 30 U/L (ref 15–37)
BACTERIA #/AREA URNS HPF: (no result) /HPF
BASOPHILS # BLD: 1.2 % (ref 0–2)
BILIRUB SERPL-MCNC: 0.3 MG/DL (ref 0.2–1)
BILIRUB UR STRIP.AUTO-MCNC: NEGATIVE MG/DL
BNP SERPL-MCNC: 517.7 PG/ML (ref 5–450)
BUN SERPL-MCNC: 16 MG/DL (ref 7–18)
CALCIUM SERPL-MCNC: 8.9 MG/DL (ref 8.5–10.1)
CHLORIDE SERPL-SCNC: 102 MMOL/L (ref 98–107)
CO2 SERPL-SCNC: 27 MMOL/L (ref 21–32)
COLOR UR: (no result)
CREAT SERPL-MCNC: 0.9 MG/DL (ref 0.55–1.3)
DEPRECATED RDW RBC AUTO: 16.4 % (ref 11.6–15.6)
EOSINOPHIL # BLD: 1.3 % (ref 0–4.5)
EPITH CASTS URNS QL MICRO: (no result) /HPF
GLUCOSE SERPL-MCNC: 98 MG/DL (ref 74–106)
HCT VFR BLD CALC: 32.4 % (ref 32.4–45.2)
HGB BLD-MCNC: 10.9 GM/DL (ref 10.7–15.3)
HYALINE CASTS URNS QL MICRO: 12 /LPF
INR BLD: 2.09 (ref 0.83–1.09)
KETONES UR QL STRIP: (no result)
LEUKOCYTE ESTERASE UR QL STRIP.AUTO: (no result)
LYMPHOCYTES # BLD: 16.2 % (ref 8–40)
MCH RBC QN AUTO: 28.5 PG (ref 25.7–33.7)
MCHC RBC AUTO-ENTMCNC: 33.5 G/DL (ref 32–36)
MCV RBC: 85.2 FL (ref 80–96)
MONOCYTES # BLD AUTO: 10.5 % (ref 3.8–10.2)
MUCOUS THREADS URNS QL MICRO: (no result)
NEUTROPHILS # BLD: 70.8 % (ref 42.8–82.8)
NITRITE UR QL STRIP: NEGATIVE
PH UR: 5 [PH] (ref 5–8)
PLATELET # BLD AUTO: 246 K/MM3 (ref 134–434)
PMV BLD: 9.3 FL (ref 7.5–11.1)
POTASSIUM SERPLBLD-SCNC: 5.1 MMOL/L (ref 3.5–5.1)
PROT SERPL-MCNC: 7 G/DL (ref 6.4–8.2)
PROT UR QL STRIP: (no result)
PROT UR QL STRIP: NEGATIVE
PT PNL PPP: 24.9 SEC (ref 9.7–13)
RBC # BLD AUTO: 3.81 M/MM3 (ref 3.6–5.2)
SODIUM SERPL-SCNC: 138 MMOL/L (ref 136–145)
SP GR UR: 1.02 (ref 1.01–1.03)
UROBILINOGEN UR STRIP-MCNC: 2 MG/DL (ref 0.2–1)
WBC # BLD AUTO: 10.4 K/MM3 (ref 4–10)

## 2018-12-25 PROCEDURE — A9502 TC99M TETROFOSMIN: HCPCS

## 2018-12-25 RX ADMIN — SOTALOL HYDROCHLORIDE SCH MG: 80 TABLET ORAL at 21:38

## 2018-12-25 RX ADMIN — PANTOPRAZOLE SODIUM SCH MG: 40 TABLET, DELAYED RELEASE ORAL at 21:39

## 2018-12-25 RX ADMIN — APIXABAN SCH MG: 5 TABLET, FILM COATED ORAL at 21:38

## 2018-12-25 RX ADMIN — ACETAMINOPHEN PRN MG: 325 TABLET ORAL at 20:57

## 2018-12-25 NOTE — EKG
Test Reason : 

Blood Pressure : ***/*** mmHG

Vent. Rate : 066 BPM     Atrial Rate : 066 BPM

   P-R Int : 244 ms          QRS Dur : 136 ms

    QT Int : 460 ms       P-R-T Axes : 077 -11 160 degrees

   QTc Int : 482 ms

 

Atrial-paced rhythm with prolonged AV conduction WITH PREMATURE

SUPRAVENTRICULAR COMPLEXES

LEFT BUNDLE BRANCH BLOCK

ABNORMAL ECG

WHEN COMPARED WITH ECG OF 30-OCT-2018 13:11,

PREMATURE SUPRAVENTRICULAR COMPLEXES ARE NOW PRESENT

T WAVE INVERSION MORE EVIDENT IN LATERAL LEADS

Confirmed by MD Kal, Sha ([id number]) on 12/25/2018 4:54:25 PM

 

Referred By:             Confirmed By:Sha Bowden MD

## 2018-12-25 NOTE — PDOC
History of Present Illness





- General


Chief Complaint: Chest Pain


Stated Complaint: Chest Pain


Time Seen by Provider: 12/25/18 09:56





- History of Present Illness


Initial Comments: 





12/25/18 10:27


78 F with h/o CABG(6 years ago), CAD, HTN, and AFib on eliquis, presenting to 

ED with chest pressure. Pt states that she has had midsternal pressure-like 

pain x 3 days. She states that it occurs mostly at night. She denies any SOB. 

Denies cough/fever. Denies leg swelling. Currently endorses mild pressure. Not 

exertional, no pleuritic. Does not recall ever having this pain before.





Past History





- Past Medical History


Allergies/Adverse Reactions: 


 Allergies











Allergy/AdvReac Type Severity Reaction Status Date / Time


 


Penicillins AdvReac Mild Itching Verified 10/30/18 12:13











Home Medications: 


Ambulatory Orders





Sotalol HCl [Betapace -] 80 mg PO BID #28 tablet 10/19/14 


Valsartan [Diovan] 160 mg PO DAILY #14 tablet 07/24/15 


Diltiazem Cd [Cardizem Cd -] 360 mg PO DAILY #30 cap.cd.24h 01/05/17 


Pravastatin Sodium [Pravachol -] 40 mg PO HS 05/10/17 


Apixaban [Eliquis -] 5 mg PO BID  tablet 03/14/18 


Hydrocodone/Acetaminophen [Hydrocodone-Acetamin 5-325 mg] 1 - 2 tab PO TID PRN #

40 tablet MDD 6 04/05/18 


Esomeprazole Magnesium [Nexium 24Hr] 20 mg PO DAILY #30 capsule.dr 04/17/18 


Pantoprazole Sodium [Protonix -] 40 mg PO BID #60 tablet.ec 05/05/18 


Sucralfate Oral Suspension [Carafate Oral Suspension -] 1 gm PO BID #1 bottle 05 /05/18 


Sucralfate [Carafate -] 1 gm PO BID #30 tablet 05/05/18 








Anemia: No


Asthma: No


Cancer: Yes (SKIN CA- EARLOBE)


Cardiac Disorders: Yes (AFIB,)


CVA: No


COPD: No


CHF: No


Dementia: No


Diabetes: No


GI Disorders: Yes (gastritis)


 Disorders: No


HTN: Yes


Hypercholesterolemia: Yes


Liver Disease:  (FATTY LIVER)


Seizures: No


Thyroid Disease: No





- Surgical History


Abdominal Surgery: No


Appendectomy: No


Cardiac Surgery: Yes (CABG)


Cholecystectomy: No


Lung Surgery: No


Neurologic Surgery: No


Orthopedic Surgery: Yes





- Family Disease History


Family Disease History: Heart Disease: Father





- Immunization History


Immunization Up to Date: Yes





- Suicide/Smoking/Psychosocial Hx


Smoking Status: Yes


Smoking History: Never smoked


Have you smoked in the past 12 months: Yes


Number of Cigarettes Smoked Daily: 0


If you are a former smoker, when did you quit?: 25 yrs ago


Hx Alcohol Use: No


Drug/Substance Use Hx: No


Substance Use Type: None


Hx Substance Use Treatment: No





Cardiac Specific PMH





- Complaint Specific PMHX


Cardiac Arrhythmia: Yes (AF)


Pacemaker: Yes (7/2011 [employer])





**Review of Systems





- Review of Systems


Comments:: 





12/25/18 10:31


"GENERAL/CONSTITUTIONAL: No fever or chills. No weakness.


HEAD, EYES, EARS, NOSE AND THROAT: No change in vision. No ear pain or 

discharge. No sore throat.


CARDIOVASCULAR: + chest pressure, no shortness of breath, no loss of 

consciousness


RESPIRATORY: No cough, wheezing, or hemoptysis.


GASTROINTESTINAL: No nausea, vomiting, diarrhea or constipation.


GENITOURINARY: No dysuria, frequency, or change in urination.


MUSCULOSKELETAL: No joint or muscle swelling or pain. No neck or back pain.


SKIN: No rash


NEUROLOGIC: No vertigo, no change in strength/sensation.


ENDOCRINE: No increased thirst. No abnormal weight change.


HEMATOLOGIC/LYMPHATIC: No anemia, easy bleeding, or history of blood clots.


ALLERGIC/IMMUNOLOGIC: No hives or skin allergy.








*Physical Exam





- Vital Signs


 Last Vital Signs











Temp Pulse Resp BP Pulse Ox


 


 97.7 F   48 L  18   146/56 L  99 


 


 12/25/18 09:33  12/25/18 09:33  12/25/18 09:33  12/25/18 09:33  12/25/18 09:33














- Physical Exam


Comments: 





12/25/18 10:32


"GENERAL: Awake, alert, and fully oriented, in no acute distress.


HEAD: No signs of trauma


EYES: PERRLA, EOMI, sclera anicteric, conjunctiva clear


ENT: Auricles normal inspection, hearing grossly normal, nares patent, 

oropharynx clear without exudates. Moist mucosa


NECK: Nontender, no stepoffs, Normal ROM, supple, no lymphadenopathy, JVD, or 

masses


LUNGS: Breath sounds equal, clear to auscultation bilaterally.  No wheezes, and 

no crackles


HEART: Regular rate and rhythm, normal S1 and S2, no murmurs, rubs or gallops


ABDOMEN: Soft, nontender, normoactive bowel sounds.  No guarding, no rebound.  

No masses


EXTREMITIES: Normal range of motion, no edema.  No clubbing or cyanosis. No 

cords, erythema, or tenderness


NEUROLOGICAL: Cranial nerves II through XII intact. 5/5 strength and sensation 

in all extremities, Normal speech, normal gait, normal cerebellar function


SKIN: Warm, Dry, normal turgor, no rashes or lesions noted.





**Heart Score/ECG Review





- History


History: Moderately suspicious





- Electrocardiogram


EKG: Non specific repolarization disturbance





- Age


Age: >/= 65





- Risk Factors


Risk Factors Heart Score: Yes Hx Hypercholesterolemia, Yes Hx Hypertension, Yes 

Hx Diabetes


Based on the list above the patient has:: >/=3 risk factors or Hx 

atherosclerotic disease





- Troponin


Troponin: </= normal limit





- Score


Heart Score - Total: 6





- ECG Impressions


Comment:: 





12/25/18 10:07


Paced rhythm, new lateral TWIs in V5-V6 not seen on prior EKG





Moderate Sedation





- Procedure Monitoring


Vital Signs: 


Procedure Monitoring Vital Signs











Temperature  97.7 F   12/25/18 09:33


 


Pulse Rate  48 L  12/25/18 09:33


 


Respiratory Rate  18   12/25/18 09:33


 


Blood Pressure  146/56 L  12/25/18 09:33


 


O2 Sat by Pulse Oximetry (%)  99   12/25/18 09:33











ED Treatment Course





- LABORATORY


CBC & Chemistry Diagram: 


 12/25/18 10:02





 12/25/18 10:50





- ADDITIONAL ORDERS


Additional order review: 


 Laboratory  Results











  12/25/18 12/25/18 12/25/18





  10:50 10:50 10:50


 


PT with INR   24.90 H 


 


INR   2.09 H 


 


PTT (Actin FS)   34.9 


 


Sodium   


 


Potassium   


 


Chloride   


 


Carbon Dioxide   


 


Anion Gap   


 


BUN   


 


Creatinine   


 


Creat Clearance w eGFR   


 


Random Glucose   


 


Calcium   


 


Total Bilirubin   


 


AST   


 


ALT   


 


Alkaline Phosphatase   


 


Creatine Kinase    57


 


Troponin I    < 0.02


 


B-Natriuretic Peptide    517.7 H


 


Total Protein   


 


Albumin   


 


Lipase  105  


 


Urine Color   


 


Urine Appearance   


 


Urine pH   


 


Ur Specific Gravity   


 


Urine Protein   


 


Urine Glucose (UA)   


 


Urine Ketones   


 


Urine Blood   


 


Urine Nitrite   


 


Urine Bilirubin   


 


Urine Urobilinogen   


 


Ur Leukocyte Esterase   


 


Urine WBC (Auto)   


 


Urine RBC (Auto)   


 


Ur Epithelial Cells   


 


Urine Bacteria   


 


Hyaline Casts   


 


Urine Mucus   














  12/25/18 12/25/18





  10:50 10:50


 


PT with INR  


 


INR  


 


PTT (Actin FS)  


 


Sodium   138


 


Potassium   5.1


 


Chloride   102


 


Carbon Dioxide   27


 


Anion Gap   8


 


BUN   16


 


Creatinine   0.9


 


Creat Clearance w eGFR   > 60


 


Random Glucose   98


 


Calcium   8.9


 


Total Bilirubin   0.3


 


AST   30


 


ALT   18


 


Alkaline Phosphatase   79


 


Creatine Kinase  


 


Troponin I  


 


B-Natriuretic Peptide  


 


Total Protein   7.0


 


Albumin   3.4


 


Lipase  


 


Urine Color  Hilda 


 


Urine Appearance  Clear 


 


Urine pH  5.0  D 


 


Ur Specific Gravity  1.025 


 


Urine Protein  1+ H 


 


Urine Glucose (UA)  Negative 


 


Urine Ketones  Trace H 


 


Urine Blood  2+ H 


 


Urine Nitrite  Negative 


 


Urine Bilirubin  Negative 


 


Urine Urobilinogen  2.0 H 


 


Ur Leukocyte Esterase  Trace 


 


Urine WBC (Auto)  2 


 


Urine RBC (Auto)  19 


 


Ur Epithelial Cells  Rare 


 


Urine Bacteria  Rare 


 


Hyaline Casts  12 


 


Urine Mucus  Moderate 








 











  12/25/18





  10:02


 


RBC  3.81


 


MCV  85.2


 


MCHC  33.5


 


RDW  16.4 H


 


MPV  9.3


 


Neutrophils %  70.8


 


Lymphocytes %  16.2  D


 


Monocytes %  10.5 H


 


Eosinophils %  1.3


 


Basophils %  1.2














- RADIOLOGY


Radiology Studies Ordered: 














 Category Date Time Status


 


 CHEST X-RAY PORTABLE* [RAD] Stat Radiology  12/25/18 10:03 Completed














Medical Decision Making





- Medical Decision Making





12/25/18 10:32


78 F with chest pressure x 3 days. EKG with new TWIs in lateral leads. Will 

need ACS r/o. Pt with no evidence of volume overload on exam. No DVT risk 

factors and is on eliquis. Low suspicion for PE.


- Labs, trop, BNP


- CXR


- Consult Dr. Parham


- Admit tele





12/25/18 12:18


Labs wnl, trop negative


Dr. Parham unavailable, will consult Dr. Guerrero





12/25/18 12:44


Case discussed with Dr. Guerrero, who recommends tele admission for trending 

of troponins


Pt admitted to Dr. Silva





*DC/Admit/Observation/Transfer


Diagnosis at time of Disposition: 


 Chest pain








- Discharge Dispostion


Decision to Admit order: Yes





- Referrals


Referrals: 


Radha Kang MD [Primary Care Provider] - 





- Patient Instructions





- Post Discharge Activity





- Attestations


Physician Attestion: 





12/25/18 12:46








I, Dr. Bhaskar Fowler MD, attest that this document has been prepared under my 

direction and personally reviewed by me in its entirety.   I further attest, 

that it accurately reflects all work, treatment, procedures and medical decision

-making performed by me.

## 2018-12-25 NOTE — HP
Admitting History and Physical





- Primary Care Physician


PCP: Carlos Luna





- Admission


Chief Complaint: CHEST PAIN WITH T WAVE INVERSION ON EKG


History of Present Illness: 





77 Y/O FEMALE WITH HISTORY OF CABG HERE WITH SUBSTERNAL CHEST PAIN FOR 1 DAY 

WITH SOB.


History Source: Patient


Limitations to Obtaining History: Other





- Past Medical History


Cardiovascular: Yes: AFIB, CAD, HTN, Hyperlipdemia


Gastrointestinal: Yes: Diverticulosis, Hemorrhoids.  No: Constipation


Musculoskeletal: Yes: Osteoarthritis (R shoulder/injury)





- Past Surgical History


Past Surgical History: Yes: Colonoscopy, Oopherectomy (right), Permanent 

Pacemaker, Upper Endoscopy





- Smoking History


Smoking history: Never smoked


Have you smoked in the past 12 months: Yes


Aproximately how many cigarettes per day: 0


If you are a former smoker, when did you quit?: 25 yrs ago





- Alcohol/Substance Use


Hx Alcohol Use: No


History of Substance Use: reports: None





- Social History


ADL: Independent


History of Recent Travel: No





Home Medications





- Allergies


Allergies/Adverse Reactions: 


 Allergies











Allergy/AdvReac Type Severity Reaction Status Date / Time


 


Penicillins AdvReac Mild Itching Verified 10/30/18 12:13














- Home Medications


Home Medications: 


Ambulatory Orders





Sotalol HCl [Betapace -] 80 mg PO BID #28 tablet 10/19/14 


Valsartan [Diovan] 160 mg PO DAILY #14 tablet 07/24/15 


Diltiazem Cd [Cardizem Cd -] 360 mg PO DAILY #30 cap.cd.24h 01/05/17 


Pravastatin Sodium [Pravachol -] 40 mg PO HS 05/10/17 


Apixaban [Eliquis -] 5 mg PO BID  tablet 03/14/18 


Esomeprazole Magnesium [Nexium 24Hr] 20 mg PO DAILY #30 capsule.dr 04/17/18 


Pantoprazole Sodium [Protonix -] 40 mg PO BID #60 tablet.ec 05/05/18 


Sucralfate Oral Suspension [Carafate Oral Suspension -] 1 gm PO BID #1 bottle 05 /05/18 


Sucralfate [Carafate -] 1 gm PO BID #30 tablet 05/05/18 











Review of Systems





- Review of Systems


Constitutional: reports: No Symptoms


Eyes: reports: No Symptoms


HENT: reports: No Symptoms


Neck: reports: No Symptoms


Cardiovascular: reports: Chest Pain, Shortness of Breath


Respiratory: reports: Orthopnea


Gastrointestinal: reports: No Symptoms


Genitourinary: reports: No Symptoms


Musculoskeletal: reports: No Symptoms


Integumentary: reports: No Symptoms


Neurological: reports: Dizziness, Headache


Endocrine: reports: No Symptoms


Hematology/Lymphatic: reports: No Symptoms


Psychiatric: reports: No Symptoms





Physical Examination


Vital Signs: 


 Vital Signs











Temperature  97.7 F   12/25/18 09:33


 


Pulse Rate  48 L  12/25/18 09:33


 


Respiratory Rate  18   12/25/18 09:33


 


Blood Pressure  146/56 L  12/25/18 09:33


 


O2 Sat by Pulse Oximetry (%)  99   12/25/18 09:33











Constitutional: Yes: No Distress


Eyes: Yes: WNL


HENT: Yes: WNL


Neck: Yes: WNL


Cardiovascular: Yes: Pulse Irregular


Respiratory: Yes: WNL


Gastrointestinal: Yes: WNL


Renal/: Yes: WNL


Musculoskeletal: Yes: WNL


Extremities: Yes: WNL


Edema: No


Peripheral Pulses WNL: Yes


Integumentary: Yes: WNL


Wound/Incision: Yes: Clean/Dry


Neurological: Yes: WNL


...Motor Strength: WNL


Psychiatric: Yes: WNL


Labs: 


 CBC, BMP





 12/25/18 10:02 





 12/25/18 10:50 











Imaging





- Results


Chest X-ray: Report Reviewed





Problem List





- Problems


(1) Chest pain


Code(s): R07.9 - CHEST PAIN, UNSPECIFIED   


Qualifiers: 


   Ischemic chest pain type: unstable angina pectoris 





(2) Afib


Code(s): I48.91 - UNSPECIFIED ATRIAL FIBRILLATION   


Qualifiers: 


   Atrial fibrillation type: paroxysmal   Qualified Code(s): I48.0 - Paroxysmal 

atrial fibrillation   





(3) Coronary artery disease


Code(s): I25.10 - ATHSCL HEART DISEASE OF NATIVE CORONARY ARTERY W/O ANG PCTRS 

  


Qualifiers: 


   Coronary Disease-Associated Artery/Lesion type: native artery   Native vs. 

transplanted heart: native heart   Associated angina: with stable angina   

Qualified Code(s): I25.118 - Atherosclerotic heart disease of native coronary 

artery with other forms of angina pectoris   





(4) Hyperlipidemia


Code(s): E78.5 - HYPERLIPIDEMIA, UNSPECIFIED   


Qualifiers: 


   Hyperlipidemia type: pure hypercholesterolemia   Qualified Code(s): E78.00 - 

Pure hypercholesterolemia, unspecified; E78.0 - Pure hypercholesterolemia   





(5) Hypertension


Code(s): I10 - ESSENTIAL (PRIMARY) HYPERTENSION   


Qualifiers: 


   Hypertension type: essential hypertension   Qualified Code(s): I10 - 

Essential (primary) hypertension   





(6) Pacemaker


Code(s): Z95.0 - PRESENCE OF CARDIAC PACEMAKER   





(7) T wave inversion on electrocardiogram


Code(s): R94.31 - ABNORMAL ELECTROCARDIOGRAM [ECG] [EKG]   





Assessment/Plan





TWAVE INVERSION TO LATER LEADS ARE NEW


WILL D/W DR FAIR FOR TRANSFER TO CARDIAC CATH LAB


WILL MONITOR FOR NOW ON TELEMETRY HERE


CHECK LABS WITH SERIAL ENZYMES FIRST SET NEGATIVE


02 SUPPORT


PATIENT IS COMFORTABLE AT THIS TIME

## 2018-12-26 LAB
ALBUMIN SERPL-MCNC: 3.6 G/DL (ref 3.4–5)
ALP SERPL-CCNC: 85 U/L (ref 45–117)
ALT SERPL-CCNC: 20 U/L (ref 13–61)
ANION GAP SERPL CALC-SCNC: 6 MMOL/L (ref 8–16)
AST SERPL-CCNC: 24 U/L (ref 15–37)
BILIRUB SERPL-MCNC: 0.3 MG/DL (ref 0.2–1)
BUN SERPL-MCNC: 16 MG/DL (ref 7–18)
CALCIUM SERPL-MCNC: 8.9 MG/DL (ref 8.5–10.1)
CHLORIDE SERPL-SCNC: 107 MMOL/L (ref 98–107)
CHOLEST SERPL-MCNC: 154 MG/DL (ref 50–200)
CO2 SERPL-SCNC: 27 MMOL/L (ref 21–32)
CREAT SERPL-MCNC: 0.8 MG/DL (ref 0.55–1.3)
DEPRECATED RDW RBC AUTO: 16.3 % (ref 11.6–15.6)
GLUCOSE SERPL-MCNC: 105 MG/DL (ref 74–106)
HCT VFR BLD CALC: 35.9 % (ref 32.4–45.2)
HDLC SERPL-MCNC: 62 MG/DL (ref 40–60)
HGB BLD-MCNC: 11.1 GM/DL (ref 10.7–15.3)
MCH RBC QN AUTO: 27.1 PG (ref 25.7–33.7)
MCHC RBC AUTO-ENTMCNC: 31 G/DL (ref 32–36)
MCV RBC: 87.6 FL (ref 80–96)
PLATELET # BLD AUTO: 159 K/MM3 (ref 134–434)
PMV BLD: 10 FL (ref 7.5–11.1)
POTASSIUM SERPLBLD-SCNC: 4.6 MMOL/L (ref 3.5–5.1)
PROT SERPL-MCNC: 7.1 G/DL (ref 6.4–8.2)
RBC # BLD AUTO: 4.1 M/MM3 (ref 3.6–5.2)
SODIUM SERPL-SCNC: 141 MMOL/L (ref 136–145)
TRIGL SERPL-MCNC: 89 MG/DL (ref 0–150)
WBC # BLD AUTO: 8.7 K/MM3 (ref 4–10)

## 2018-12-26 RX ADMIN — APIXABAN SCH MG: 5 TABLET, FILM COATED ORAL at 22:34

## 2018-12-26 RX ADMIN — APIXABAN SCH MG: 5 TABLET, FILM COATED ORAL at 10:42

## 2018-12-26 RX ADMIN — PANTOPRAZOLE SODIUM SCH MG: 40 TABLET, DELAYED RELEASE ORAL at 22:34

## 2018-12-26 RX ADMIN — VALSARTAN SCH MG: 160 TABLET, FILM COATED ORAL at 10:42

## 2018-12-26 RX ADMIN — ACETAMINOPHEN PRN MG: 325 TABLET ORAL at 22:37

## 2018-12-26 RX ADMIN — SOTALOL HYDROCHLORIDE SCH MG: 80 TABLET ORAL at 10:42

## 2018-12-26 RX ADMIN — PANTOPRAZOLE SODIUM SCH MG: 40 TABLET, DELAYED RELEASE ORAL at 10:42

## 2018-12-26 RX ADMIN — SOTALOL HYDROCHLORIDE SCH MG: 80 TABLET ORAL at 22:34

## 2018-12-26 NOTE — CON.CARD
Consult


Consult Specialty:: Cardiology


Referred by:: Dr. Silva


Reason for Consultation:: chest pain





- History of Present Illness


Chief Complaint: chest pain


History of Present Illness: 





78F Mild AS, Non-obstx CAD, HTN, mild non obstx carotid plaque, HTN, PPM, PAF 

admitted with central chest pain which occurred after lifting a wet, heavy 

blanket. Central in location, reproducible but noted to have T wave changes on 

ECG so was admitted for further w/u





Denies WEBB, nausea, vomiting or diaphoresis.


No palps.


No syncope.


No edema. 





- History Source


History Provided By: Patient, Family Member, Medical Record





- Past Medical History


Cardio/Vascular: Yes: AFIB, CAD, HTN, Hyperlipdemia, Other (PPM)


Gastrointestinal: Yes: Diverticulosis, Hemorrhoids.  No: Constipation


Musculoskeletal: Yes: Osteoarthritis (R shoulder/injury)





- Past Surgical History


Past Surgical History: Yes: Colonoscopy, Oopherectomy (right), Permanent 

Pacemaker, Upper Endoscopy





- Alcohol/Substance Use


Hx Alcohol Use: No


History of Substance Use: reports: None





- Smoking History


Smoking history: Never smoked


Have you smoked in the past 12 months: Yes


Aproximately how many cigarettes per day: 0


If you are a former smoker, when did you quit?: 25 yrs ago





- Social History


ADL: Independent


History of Recent Travel: No





Home Medications





- Allergies


Allergies/Adverse Reactions: 


 Allergies











Allergy/AdvReac Type Severity Reaction Status Date / Time


 


Penicillins AdvReac Mild Itching Verified 10/30/18 12:13














- Home Medications


Home Medications: 


Ambulatory Orders





Sotalol HCl [Betapace -] 80 mg PO BID #28 tablet 10/19/14 


Valsartan [Diovan] 160 mg PO DAILY #14 tablet 07/24/15 


Diltiazem Cd [Cardizem Cd -] 360 mg PO DAILY #30 cap.cd.24h 01/05/17 


Pravastatin Sodium [Pravachol -] 40 mg PO HS 05/10/17 


Apixaban [Eliquis -] 5 mg PO BID  tablet 03/14/18 


Esomeprazole Magnesium [Nexium 24Hr] 20 mg PO DAILY #30 capsule. 04/17/18 


Pantoprazole Sodium [Protonix -] 40 mg PO BID #60 tablet.ec 05/05/18 


Sucralfate Oral Suspension [Carafate Oral Suspension -] 1 gm PO BID #1 bottle 05 /05/18 


Sucralfate [Carafate -] 1 gm PO BID #30 tablet 05/05/18 











Family Disease History





- Family Disease History


Family History: Unremarkable





Review of Systems


Findings/Remarks: 





see HPI





- Review of Systems


Constitutional: reports: No Symptoms


Eyes: reports: No Symptoms


HENT: reports: No Symptoms


Cardiovascular: reports: Chest Pain


Respiratory: reports: No Symptoms


Gastrointestinal: reports: No Symptoms


Genitourinary: reports: No Symptoms


Breasts: reports: No Symptoms Reported


Musculoskeletal: reports: No Symptoms


Integumentary: reports: No Symptoms


Neurological: reports: No Symptoms


Endocrine: reports: No Symptoms


Hematology/Lymphatic: reports: No Symptoms


Psychiatric: reports: No Symptoms





- Risk Factors


Known Risk Factors: Yes: Hypercholesterolemia, Hypertension


Vital Signs: 


 Vital Signs











Temperature  98.8 F   12/25/18 19:54


 


Pulse Rate  72   12/26/18 09:42


 


Respiratory Rate  18   12/26/18 09:42


 


Blood Pressure  117/75   12/26/18 09:42


 


O2 Sat by Pulse Oximetry (%)  97   12/26/18 09:42











Constitutional: Yes: Calm


Eyes: Yes: EOM Intact


Respiratory: Yes: CTA Bilaterally


Gastrointestinal: Yes: Soft (NT)


JVD: No


Heart Sounds: Yes: S1, S2 (regular)


Murmur: Yes: Systolic Murmur, Grade 2


Extremities: Yes: WNL


Edema: No


Neurological: Yes: Alert, Oriented


...Motor Strength: WNL





- Other Data


Labs, Other Data: 


 CBC, BMP





 12/26/18 05:25 





 12/26/18 05:25 





 INR, PTT











INR  2.09  (0.83-1.09)  H  12/25/18  10:50    








 Troponin, BNP











  12/25/18 12/26/18





  23:27 14:15


 


Troponin I  < 0.02  < 0.02








 Troponin, BNP











  12/25/18 12/26/18





  23:27 14:15


 


Troponin I  < 0.02  < 0.02














Atrial paced. NSST changes I, aVL, V5, V6. 


Echo: Pending


Prior Cardiac Procedures: Cardiac Catheterization (non-obstructive 2011)


Ejection Fraction %: LVEF > or = 40 %





Imaging





- Results


Chest X-ray: Report Reviewed


EKG: Image Reviewed





Problem List





- Problems


(1) Atypical chest pain


Code(s): R07.89 - OTHER CHEST PAIN   





(2) Abnormal ECG


Code(s): R94.31 - ABNORMAL ELECTROCARDIOGRAM [ECG] [EKG]   





(3) Pacemaker


Code(s): Z95.0 - PRESENCE OF CARDIAC PACEMAKER   





(4) Atrial fibrillation


Code(s): I48.91 - UNSPECIFIED ATRIAL FIBRILLATION   


Qualifiers: 


   Atrial fibrillation type: paroxysmal   Qualified Code(s): I48.0 - Paroxysmal 

atrial fibrillation   





(5) Coronary artery disease


Assessment/Plan: 


NON-OBSTRUCTIVE


Code(s): I25.10 - ATHSCL HEART DISEASE OF NATIVE CORONARY ARTERY W/O ANG PCTRS 

  


Qualifiers: 


   Coronary Disease-Associated Artery/Lesion type: native artery 





Assessment/Plan





IMP:


Atypical CP


Nonspecific T wave changes- possibly "T wave memory" repol changes from 

intermittent pacing


Non-obstx CAD


PAF on AC (NOAC) 





REC:


1. 2 sets cardiac enzymes negative


2. Echo for EF assessment , r/o pericardial effusion


3. To complete nuclear stress in AM- had resting images today


4. Cont home meds.





Will follow

## 2018-12-26 NOTE — PN
Progress Note, Physician


Chief Complaint: 





chest pain


History of Present Illness: 





Previous notes and events reviewed


alert and awake


NAD


denies chest pain, SOB


troponin neg x 3





- Current Medication List


Current Medications: 


Active Medications





Acetaminophen (Tylenol -)  650 mg PO Q6H PRN


   PRN Reason: PAIN OR FEVER


   Last Admin: 12/25/18 20:57 Dose:  650 mg


Apixaban (Eliquis -)  5 mg PO BID [facility]


   Last Admin: 12/26/18 10:42 Dose:  5 mg


Diltiazem HCl (Cardizem Cd -)  360 mg PO DAILY [facility]


   Last Admin: 12/26/18 10:42 Dose:  360 mg


Pantoprazole Sodium (Protonix -)  40 mg PO BID [facility]


   Last Admin: 12/26/18 10:42 Dose:  40 mg


Sotalol HCl (Betapace -)  80 mg PO BID [facility]


   Last Admin: 12/26/18 10:42 Dose:  80 mg


Valsartan (Diovan -)  160 mg PO DAILY [facility]


   Last Admin: 12/26/18 10:42 Dose:  160 mg











- Objective


Vital Signs: 


 Vital Signs











Temperature  98.8 F   12/25/18 19:54


 


Pulse Rate  72   12/26/18 09:42


 


Respiratory Rate  18   12/26/18 09:42


 


Blood Pressure  117/75   12/26/18 09:42


 


O2 Sat by Pulse Oximetry (%)  97   12/26/18 09:42











Constitutional: Yes: Well Nourished, No Distress, Calm


Eyes: Yes: Conjunctiva Clear


Neck: Yes: Supple


Cardiovascular: Yes: Regular Rate and Rhythm


Respiratory: Yes: Regular, CTA Bilaterally


Gastrointestinal: Yes: WNL, Normal Bowel Sounds, Soft


Musculoskeletal: Yes: Muscle Weakness


Extremities: Yes: WNL


Edema: No


Integumentary: Yes: WNL


Neurological: Yes: Alert, Oriented


Psychiatric: Yes: Alert, Oriented


Labs: 


 CBC, BMP





 12/26/18 05:25 





 12/26/18 05:25 





 INR, PTT











INR  2.09  (0.83-1.09)  H  12/25/18  10:50    














Problem List





- Problems


(1) Atypical chest pain


Code(s): R07.89 - OTHER CHEST PAIN   





(2) T wave inversion on electrocardiogram


Code(s): R94.31 - ABNORMAL ELECTROCARDIOGRAM [ECG] [EKG]   





(3) Afib


Code(s): I48.91 - UNSPECIFIED ATRIAL FIBRILLATION   


Qualifiers: 


   Atrial fibrillation type: paroxysmal   Qualified Code(s): I48.0 - Paroxysmal 

atrial fibrillation   





(4) Coronary artery disease


Code(s): I25.10 - ATHSCL HEART DISEASE OF NATIVE CORONARY ARTERY W/O ANG PCTRS 

  


Qualifiers: 


   Coronary Disease-Associated Artery/Lesion type: native artery 





(5) Hyperlipidemia


Code(s): E78.5 - HYPERLIPIDEMIA, UNSPECIFIED   


Qualifiers: 


   Hyperlipidemia type: pure hypercholesterolemia   Qualified Code(s): E78.00 - 

Pure hypercholesterolemia, unspecified; E78.0 - Pure hypercholesterolemia   





Assessment/Plan





-admit to tele unit


-cardiology on board


-to have nuclear stress test in AM


-echo ordered


-O2 NC as needed for SOB or SpO2 <90%


-

## 2018-12-26 NOTE — PN
Progress Note (short form)





- Note


Progress Note: 





seen with beata sosa


d/rafael youssef


will have stress test in am





Problem List





- Problems


(1) Chest pain


Code(s): R07.9 - CHEST PAIN, UNSPECIFIED   


Qualifiers: 


   Ischemic chest pain type: unstable angina pectoris 





(2) Afib


Code(s): I48.91 - UNSPECIFIED ATRIAL FIBRILLATION   


Qualifiers: 


   Atrial fibrillation type: paroxysmal   Qualified Code(s): I48.0 - Paroxysmal 

atrial fibrillation   





(3) Coronary artery disease


Code(s): I25.10 - ATHSCL HEART DISEASE OF NATIVE CORONARY ARTERY W/O ANG PCTRS 

  


Qualifiers: 


   Coronary Disease-Associated Artery/Lesion type: native artery   Native vs. 

transplanted heart: native heart   Associated angina: with stable angina   

Qualified Code(s): I25.118 - Atherosclerotic heart disease of native coronary 

artery with other forms of angina pectoris   





(4) Hyperlipidemia


Code(s): E78.5 - HYPERLIPIDEMIA, UNSPECIFIED   


Qualifiers: 


   Hyperlipidemia type: pure hypercholesterolemia   Qualified Code(s): E78.00 - 

Pure hypercholesterolemia, unspecified; E78.0 - Pure hypercholesterolemia   





(5) Hypertension


Code(s): I10 - ESSENTIAL (PRIMARY) HYPERTENSION   


Qualifiers: 


   Hypertension type: essential hypertension   Qualified Code(s): I10 - 

Essential (primary) hypertension   





(6) Pacemaker


Code(s): Z95.0 - PRESENCE OF CARDIAC PACEMAKER   





(7) T wave inversion on electrocardiogram


Code(s): R94.31 - ABNORMAL ELECTROCARDIOGRAM [ECG] [EKG]

## 2018-12-27 LAB
ALBUMIN SERPL-MCNC: 3.1 G/DL (ref 3.4–5)
ALP SERPL-CCNC: 76 U/L (ref 45–117)
ALT SERPL-CCNC: 17 U/L (ref 13–61)
ANION GAP SERPL CALC-SCNC: 6 MMOL/L (ref 8–16)
AST SERPL-CCNC: 14 U/L (ref 15–37)
BILIRUB SERPL-MCNC: 0.4 MG/DL (ref 0.2–1)
BUN SERPL-MCNC: 17 MG/DL (ref 7–18)
CALCIUM SERPL-MCNC: 8.8 MG/DL (ref 8.5–10.1)
CHLORIDE SERPL-SCNC: 104 MMOL/L (ref 98–107)
CO2 SERPL-SCNC: 28 MMOL/L (ref 21–32)
CREAT SERPL-MCNC: 0.8 MG/DL (ref 0.55–1.3)
DEPRECATED RDW RBC AUTO: 16.1 % (ref 11.6–15.6)
GLUCOSE SERPL-MCNC: 99 MG/DL (ref 74–106)
HCT VFR BLD CALC: 31.3 % (ref 32.4–45.2)
HGB BLD-MCNC: 9.9 GM/DL (ref 10.7–15.3)
MCH RBC QN AUTO: 27.3 PG (ref 25.7–33.7)
MCHC RBC AUTO-ENTMCNC: 31.6 G/DL (ref 32–36)
MCV RBC: 86.4 FL (ref 80–96)
PLATELET # BLD AUTO: 222 K/MM3 (ref 134–434)
PMV BLD: 8.9 FL (ref 7.5–11.1)
POTASSIUM SERPLBLD-SCNC: 4.8 MMOL/L (ref 3.5–5.1)
PROT SERPL-MCNC: 6.5 G/DL (ref 6.4–8.2)
RBC # BLD AUTO: 3.62 M/MM3 (ref 3.6–5.2)
SODIUM SERPL-SCNC: 138 MMOL/L (ref 136–145)
WBC # BLD AUTO: 8.9 K/MM3 (ref 4–10)

## 2018-12-27 RX ADMIN — APIXABAN SCH MG: 5 TABLET, FILM COATED ORAL at 12:07

## 2018-12-27 RX ADMIN — ACETAMINOPHEN PRN MG: 325 TABLET ORAL at 18:37

## 2018-12-27 RX ADMIN — SOTALOL HYDROCHLORIDE SCH MG: 80 TABLET ORAL at 21:14

## 2018-12-27 RX ADMIN — PANTOPRAZOLE SODIUM SCH MG: 40 TABLET, DELAYED RELEASE ORAL at 21:15

## 2018-12-27 RX ADMIN — SOTALOL HYDROCHLORIDE SCH MG: 80 TABLET ORAL at 12:07

## 2018-12-27 RX ADMIN — VALSARTAN SCH MG: 160 TABLET, FILM COATED ORAL at 12:07

## 2018-12-27 RX ADMIN — PANTOPRAZOLE SODIUM SCH MG: 40 TABLET, DELAYED RELEASE ORAL at 12:07

## 2018-12-27 NOTE — PN
Progress Note, Physician


Chief Complaint: 





feeling well





TELE: A Paced.





Echo being done





- Current Medication List


Current Medications: 


Active Medications





Acetaminophen (Tylenol -)  650 mg PO Q6H PRN


   PRN Reason: PAIN OR FEVER


   Last Admin: 12/26/18 22:37 Dose:  650 mg


Al Hydroxide/Mg Hydroxide (Mylanta Oral Suspension -)  30 ml PO Q6H PRN


   PRN Reason: DYSPEPSIA


   Last Admin: 12/26/18 18:32 Dose:  30 ml


Apixaban (Eliquis -)  5 mg PO BID [facility]


   Last Admin: 12/26/18 22:34 Dose:  5 mg


Diltiazem HCl (Cardizem Cd -)  360 mg PO DAILY [facility]


   Last Admin: 12/26/18 10:42 Dose:  360 mg


Pantoprazole Sodium (Protonix -)  40 mg PO BID [facility]


   Last Admin: 12/26/18 22:34 Dose:  40 mg


Sotalol HCl (Betapace -)  80 mg PO BID [facility]


   Last Admin: 12/26/18 22:34 Dose:  80 mg


Valsartan (Diovan -)  160 mg PO DAILY [facility]


   Last Admin: 12/26/18 10:42 Dose:  160 mg











- Objective


Vital Signs: 


 Vital Signs











Temperature  98 F   12/27/18 05:18


 


Pulse Rate  71   12/27/18 05:18


 


Respiratory Rate  18   12/27/18 05:18


 


Blood Pressure  128/53 L  12/27/18 05:18


 


O2 Sat by Pulse Oximetry (%)  96   12/26/18 21:00











Constitutional: Yes: No Distress


Cardiovascular: Yes: Regular Rate and Rhythm (A- paced)


Respiratory: Yes: CTA Bilaterally


Gastrointestinal: Yes: Soft (NT)


Edema: No


Neurological: Yes: Alert


Labs: 


 CBC, BMP





 12/27/18 07:00 





 INR, PTT











INR  2.09  (0.83-1.09)  H  12/25/18  10:50    














- ....Imaging


EKG: Image Reviewed





Problem List





- Problems


(1) Atypical chest pain


Code(s): R07.89 - OTHER CHEST PAIN   





(2) Abnormal ECG


Code(s): R94.31 - ABNORMAL ELECTROCARDIOGRAM [ECG] [EKG]   





(3) Pacemaker


Code(s): Z95.0 - PRESENCE OF CARDIAC PACEMAKER   





(4) Atrial fibrillation


Code(s): I48.91 - UNSPECIFIED ATRIAL FIBRILLATION   


Qualifiers: 


   Atrial fibrillation type: paroxysmal   Qualified Code(s): I48.0 - Paroxysmal 

atrial fibrillation   





(5) Coronary artery disease


Code(s): I25.10 - ATHSCL HEART DISEASE OF NATIVE CORONARY ARTERY W/O ANG PCTRS 

  


Qualifiers: 


   Coronary Disease-Associated Artery/Lesion type: native artery 





Assessment/Plan





IMP:


Atypical CP


Nonspecific T wave changes- possibly "T wave memory" repol changes from 

intermittent pacing


Non-obstx CAD


PAF on AC (NOAC) 





REC:


1. 2 sets cardiac enzymes negative


2. Echo for EF assessment , r/o pericardial effusion


3. To complete nuclear stress today- had resting images today


4. Cont home meds.





If stress WNL, ok d/c home


Has PPM interrogation scheduled in my office in few weeks

## 2018-12-27 NOTE — ECHO
______________________________________________________________________________



Name: ANA SELLERS                                  Exam:Adult Echocardiogram

MRN: [id number]         Study Date: 2018 07:56 AM

Age: 78 yrs

______________________________________________________________________________



Reason For Study: Chest pain

Height: 64 in        Weight: 150 lb        BSA: 1.7 m2



______________________________________________________________________________



MMode/2D Measurements & Calculations

IVSd: 1.5 cm                                            Ao root diam: 2.9 cm

LVIDd: 4.1 cm                                           LA dimension: 4.0 cm

LVIDs: 2.8 cm

LVPWd: 1.4 cm



_________________________________________________________

EDV(Teich): 73.4 ml                                     LVOT diam: 2.0 cm

ESV(Teich): 28.7 ml



_________________________________________________________

LAV (MOD-bp): 64.3 ml



Doppler Measurements & Calculations

MV V2 max: 182.4 cm/sec                                 MV E max steve: 102.0 cm/sec

MV max P.3 mmHg                                    MV A max steve: 155.9 cm/sec

MV V2 mean: 116.4 cm/sec                                MV E/A: 0.65

MV mean P.0 mmHg

MV V2 VTI: 52.0 cm



_________________________________________________________

MV dec slope: 607.9 cm/sec2                             Ao V2 max: 260.6 cm/sec

                                                        Ao max P.2 mmHg

                                                        Ao V2 mean: 164.7 cm/sec

                                                        Ao mean P.8 mmHg

                                                        Ao V2 VTI: 57.4 cm

                                                        AI P1/2t: 481.6 msec



_________________________________________________________

AI max steve: 417.9 cm/sec                                TR max steve: 243.0 cm/sec

AI max P.1 mmHg                                    TR max P.2 mmHg

AI dec slope: 254.1 cm/sec2



_________________________________________________________

Med Peak E' Steve: 2.6 cm/sec                             PI Vmax: 86.9 cm/sec

Med E/e': 39.1

Lat Peak E' Steve: 3.0 cm/sec

Lat E/e': 33.5





______________________________________________________________________________

Procedure

A complete two-dimensional transthoracic echocardiogram was performed (2D, M-mode, Doppler and color 
flow

Doppler).

Left Ventricle

There is moderate concentric left ventricular hypertrophy. The left ventricular ejection fraction is 
normal.

Ejection Fraction = 60-65%. The left ventricular wall motion is normal.

Right Ventricle

The right ventricle is normal in size and function. There is a pacemaker lead in the right ventricle.


Atria

The left atrium is mildly dilated. Right atrial size is normal. There is a catheter/pacemaker lead se
en in the

right atrium.

Mitral Valve

There is mild mitral regurgitation.

Tricuspid Valve

There is trace tricuspid regurgitation. Right ventricular systolic pressure is normal.

Aortic Valve

The aortic valve is trileaflet. Mild valvular aortic stenosis. Mild to moderate aortic regurgitation.


Pulmonic Valve

Trace pulmonic valvular regurgitation.

Great Vessels

The aortic root is normal size.

Pericardium/Pleura

Small pericardial effusion (<1cm). There are no echocardiographic indications of cardiac tamponade.



______________________________________________________________________________



Interpretation Summary

There is moderate concentric left ventricular hypertrophy.

The left ventricular ejection fraction is normal.

The right ventricle is normal in size and function.

There is a pacemaker lead in the right ventricle.

The left atrium is mildly dilated.

There is a catheter/pacemaker lead seen in the right atrium.

There is mild mitral regurgitation.

There is trace tricuspid regurgitation.

Mild valvular aortic stenosis.

Mild to moderate aortic regurgitation.

Trace pulmonic valvular regurgitation.

Small pericardial effusion (<1cm)







MD Cortez Haines 2018 02:33 PM

## 2018-12-27 NOTE — PN
Progress Note, Physician


Chief Complaint: 





PATIENT HAD STRESS TEST TODAY


FINDINGS SUSPICIOUS FOR CARDIAC EVENT








- Current Medication List


Current Medications: 


Active Medications





Acetaminophen (Tylenol -)  650 mg PO Q6H PRN


   PRN Reason: PAIN OR FEVER


   Last Admin: 12/26/18 22:37 Dose:  650 mg


Al Hydroxide/Mg Hydroxide (Mylanta Oral Suspension -)  30 ml PO Q6H PRN


   PRN Reason: DYSPEPSIA


   Last Admin: 12/26/18 18:32 Dose:  30 ml


Apixaban (Eliquis -)  5 mg PO BID [facility]


   Last Admin: 12/27/18 12:07 Dose:  5 mg


Diltiazem HCl (Cardizem Cd -)  360 mg PO DAILY [facility]


   Last Admin: 12/27/18 12:07 Dose:  360 mg


Pantoprazole Sodium (Protonix -)  40 mg PO BID [facility]


   Last Admin: 12/27/18 12:07 Dose:  40 mg


Sotalol HCl (Betapace -)  80 mg PO BID [facility]


   Last Admin: 12/27/18 12:07 Dose:  80 mg


Valsartan (Diovan -)  160 mg PO DAILY [facility]


   Last Admin: 12/27/18 12:07 Dose:  160 mg











- Objective


Vital Signs: 


 Vital Signs











Temperature  98.1 F   12/27/18 14:16


 


Pulse Rate  60   12/27/18 14:16


 


Respiratory Rate  18   12/27/18 14:16


 


Blood Pressure  115/55 L  12/27/18 14:16


 


O2 Sat by Pulse Oximetry (%)  96   12/27/18 09:00











Constitutional: Yes: Mild Distress


Eyes: Yes: WNL


HENT: Yes: WNL


Neck: Yes: WNL


Cardiovascular: Yes: Pulse Irregular


Respiratory: Yes: WNL


Gastrointestinal: Yes: WNL


Musculoskeletal: Yes: WNL


Extremities: Yes: WNL


Edema: Yes


...Motor Strength: WNL


Psychiatric: Yes: WNL


Labs: 


 CBC, BMP





 12/27/18 07:00 





 12/27/18 06:00 





 INR, PTT











INR  2.09  (0.83-1.09)  H  12/25/18  10:50    














Problem List





- Problems


(1) Chest pain


Code(s): R07.9 - CHEST PAIN, UNSPECIFIED   


Qualifiers: 


   Ischemic chest pain type: unstable angina pectoris 





(2) Afib


Code(s): I48.91 - UNSPECIFIED ATRIAL FIBRILLATION   


Qualifiers: 


   Atrial fibrillation type: paroxysmal   Qualified Code(s): I48.0 - Paroxysmal 

atrial fibrillation   





(3) Coronary artery disease


Code(s): I25.10 - ATHSCL HEART DISEASE OF NATIVE CORONARY ARTERY W/O ANG PCTRS 

  


Qualifiers: 


   Coronary Disease-Associated Artery/Lesion type: native artery   Native vs. 

transplanted heart: native heart   Associated angina: with stable angina   

Qualified Code(s): I25.118 - Atherosclerotic heart disease of native coronary 

artery with other forms of angina pectoris   





(4) Hyperlipidemia


Code(s): E78.5 - HYPERLIPIDEMIA, UNSPECIFIED   


Qualifiers: 


   Hyperlipidemia type: pure hypercholesterolemia   Qualified Code(s): E78.00 - 

Pure hypercholesterolemia, unspecified; E78.0 - Pure hypercholesterolemia   





(5) Hypertension


Code(s): I10 - ESSENTIAL (PRIMARY) HYPERTENSION   


Qualifiers: 


   Hypertension type: essential hypertension   Qualified Code(s): I10 - 

Essential (primary) hypertension   





(6) Pacemaker


Code(s): Z95.0 - PRESENCE OF CARDIAC PACEMAKER   





(7) T wave inversion on electrocardiogram


Code(s): R94.31 - ABNORMAL ELECTROCARDIOGRAM [ECG] [EKG]   





Assessment/Plan





TWAVE INVERSION TO LATER LEADS ARE NEW


STRESS TEST SUSPICIOUS FOR CARDIAC EVENT


WILL D/W DR FAIR FOR TRANSFER TO CARDIAC CATH LAB


WILL MONITOR FOR NOW ON TELEMETRY HERE


CHECK LABS WITH SERIAL ENZYMES FIRST SET NEGATIVE


02 SUPPORT


PATIENT IS COMFORTABLE AT THIS TIME

## 2018-12-28 VITALS — DIASTOLIC BLOOD PRESSURE: 56 MMHG | TEMPERATURE: 98.1 F | HEART RATE: 61 BPM | SYSTOLIC BLOOD PRESSURE: 128 MMHG

## 2018-12-28 RX ADMIN — ACETAMINOPHEN PRN MG: 325 TABLET ORAL at 10:30

## 2018-12-28 RX ADMIN — PANTOPRAZOLE SODIUM SCH MG: 40 TABLET, DELAYED RELEASE ORAL at 09:50

## 2018-12-28 RX ADMIN — VALSARTAN SCH MG: 160 TABLET, FILM COATED ORAL at 09:50

## 2018-12-28 RX ADMIN — APIXABAN SCH MG: 5 TABLET, FILM COATED ORAL at 10:25

## 2018-12-28 RX ADMIN — SOTALOL HYDROCHLORIDE SCH MG: 80 TABLET ORAL at 09:50

## 2019-02-07 ENCOUNTER — HOSPITAL ENCOUNTER (EMERGENCY)
Dept: HOSPITAL 74 - JER | Age: 79
Discharge: HOME | End: 2019-02-07
Payer: COMMERCIAL

## 2019-02-07 VITALS — BODY MASS INDEX: 24.7 KG/M2

## 2019-02-07 VITALS — TEMPERATURE: 98.6 F | SYSTOLIC BLOOD PRESSURE: 124 MMHG | DIASTOLIC BLOOD PRESSURE: 50 MMHG | HEART RATE: 71 BPM

## 2019-02-07 DIAGNOSIS — I25.10: ICD-10-CM

## 2019-02-07 DIAGNOSIS — Z95.1: ICD-10-CM

## 2019-02-07 DIAGNOSIS — Z85.828: ICD-10-CM

## 2019-02-07 DIAGNOSIS — I10: ICD-10-CM

## 2019-02-07 DIAGNOSIS — E78.00: ICD-10-CM

## 2019-02-07 DIAGNOSIS — E11.9: ICD-10-CM

## 2019-02-07 DIAGNOSIS — K92.1: Primary | ICD-10-CM

## 2019-02-07 LAB
ALBUMIN SERPL-MCNC: 3.6 G/DL (ref 3.4–5)
ALP SERPL-CCNC: 89 U/L (ref 45–117)
ALT SERPL-CCNC: 13 U/L (ref 13–61)
ANION GAP SERPL CALC-SCNC: 5 MMOL/L (ref 8–16)
AST SERPL-CCNC: 15 U/L (ref 15–37)
BASOPHILS # BLD: 1 % (ref 0–2)
BILIRUB SERPL-MCNC: 0.3 MG/DL (ref 0.2–1)
BUN SERPL-MCNC: 16 MG/DL (ref 7–18)
CALCIUM SERPL-MCNC: 9.4 MG/DL (ref 8.5–10.1)
CHLORIDE SERPL-SCNC: 106 MMOL/L (ref 98–107)
CO2 SERPL-SCNC: 29 MMOL/L (ref 21–32)
CREAT SERPL-MCNC: 0.7 MG/DL (ref 0.55–1.3)
DEPRECATED RDW RBC AUTO: 16 % (ref 11.6–15.6)
EOSINOPHIL # BLD: 2.4 % (ref 0–4.5)
GLUCOSE SERPL-MCNC: 95 MG/DL (ref 74–106)
HCT VFR BLD CALC: 35 % (ref 32.4–45.2)
HGB BLD-MCNC: 11.4 GM/DL (ref 10.7–15.3)
INR BLD: 1.4 (ref 0.83–1.09)
LYMPHOCYTES # BLD: 19.2 % (ref 8–40)
MCH RBC QN AUTO: 27.5 PG (ref 25.7–33.7)
MCHC RBC AUTO-ENTMCNC: 32.4 G/DL (ref 32–36)
MCV RBC: 84.7 FL (ref 80–96)
MONOCYTES # BLD AUTO: 10.3 % (ref 3.8–10.2)
NEUTROPHILS # BLD: 67.1 % (ref 42.8–82.8)
PLATELET # BLD AUTO: 277 K/MM3 (ref 134–434)
PMV BLD: 8.5 FL (ref 7.5–11.1)
POTASSIUM SERPLBLD-SCNC: 4.7 MMOL/L (ref 3.5–5.1)
PROT SERPL-MCNC: 7.5 G/DL (ref 6.4–8.2)
PT PNL PPP: 16.6 SEC (ref 9.7–13)
RBC # BLD AUTO: 4.13 M/MM3 (ref 3.6–5.2)
SODIUM SERPL-SCNC: 141 MMOL/L (ref 136–145)
WBC # BLD AUTO: 9.2 K/MM3 (ref 4–10)

## 2019-02-07 NOTE — PDOC
History of Present Illness





- General


Chief Complaint: Bleeding from Anus


Stated Complaint: RECTAL BLEED


Time Seen by Provider: 02/07/19 11:39





- History of Present Illness


Initial Comments: 





02/07/19 12:56


Ms. Ospina is a 80 yo female w/ pmh of CABG (2012), CAD, HTN, and Afib on 

eliquis who presents for evaluation of perceived weakness for the past 4-5 days 

and additionally noted blood on her stool yesterday and this morning. Patient 

reports she was placed on new medications last week (Suprax and Pyridium) by 

PCP however is not sure why. Reports that weakness started following this. 

Became concerned with blood on stool and elected to present to ED.





The patient denies chest pain, shortness of breath, headache and dizziness. 

Denies fever, chills, nausea, vomit, diarrhea and constipation. Denies dysuria, 

frequency, urgency and hematuria. 





Past History





- Past Medical History


Allergies/Adverse Reactions: 


 Allergies











Allergy/AdvReac Type Severity Reaction Status Date / Time


 


Penicillins AdvReac Mild Itching Verified 10/30/18 12:13











Home Medications: 


Ambulatory Orders





Sotalol HCl [Betapace -] 80 mg PO BID #28 tablet 10/19/14 


Valsartan [Diovan] 160 mg PO DAILY #14 tablet 07/24/15 


Diltiazem Cd [Cardizem Cd -] 360 mg PO DAILY #30 cap.cd.24h 01/05/17 


Pravastatin Sodium [Pravachol -] 40 mg PO HS 05/10/17 


Apixaban [Eliquis -] 5 mg PO BID  tablet 03/14/18 


Esomeprazole Magnesium [Nexium 24Hr] 20 mg PO DAILY #30 capsule.dr 04/17/18 


Pantoprazole Sodium [Protonix -] 40 mg PO BID #60 tablet.ec 05/05/18 


Sucralfate Oral Suspension [Carafate Oral Suspension -] 1 gm PO BID #1 bottle 05 /05/18 


Sucralfate [Carafate -] 1 gm PO BID #30 tablet 05/05/18 


Isosorbide Dinitrate [Isordil -] 5 mg PO BID #60 tablet 12/28/18 


Sotalol HCl [Betapace -] 80 mg PO BID  tablet 12/28/18 








Anemia: No


Asthma: No


Cancer: Yes (SKIN CA- EARLOBE)


Cardiac Disorders: Yes (AFIB,)


CVA: No


COPD: No


CHF: No


Dementia: No


Diabetes: Yes


GI Disorders: Yes (gastritis)


 Disorders: No


HTN: Yes


Hypercholesterolemia: Yes


Liver Disease:  (FATTY LIVER)


Seizures: No


Thyroid Disease: No





- Surgical History


Abdominal Surgery: No


Appendectomy: No


Cardiac Surgery: Yes (CABG)


Cholecystectomy: No


Lung Surgery: No


Neurologic Surgery: No


Orthopedic Surgery: Yes





- Family Disease History


Family Disease History: Heart Disease: Father





- Immunization History


Immunization Up to Date: Yes





- Suicide/Smoking/Psychosocial Hx


Smoking Status: Yes


Smoking History: Never smoked


Have you smoked in the past 12 months: No


Number of Cigarettes Smoked Daily: 0


If you are a former smoker, when did you quit?: 25 yrs ago


Information on smoking cessation initiated: No


Hx Alcohol Use: No


Drug/Substance Use Hx: No


Substance Use Type: None


Hx Substance Use Treatment: No





**Review of Systems





- Review of Systems


Comments:: 





02/07/19 13:32


GENERAL/CONSTITUTIONAL: +Generalized weakness. No fever or chills. 


HEAD, EYES, EARS, NOSE AND THROAT: No change in vision. No ear pain or 

discharge. No sore throat.


CARDIOVASCULAR: No chest pain or shortness of breath


RESPIRATORY: No cough, wheezing, or hemoptysis.


GASTROINTESTINAL: No nausea, vomiting, diarrhea or constipation.


GENITOURINARY: No dysuria, frequency, or change in urination.


MUSCULOSKELETAL: No joint or muscle swelling or pain. No neck or back pain.


SKIN: No rash


NEUROLOGIC: No headache, vertigo, loss of consciousness, or change in strength/

sensation.


ENDOCRINE: No increased thirst. No abnormal weight change


HEMATOLOGIC/LYMPHATIC: No anemia, easy bleeding, or history of blood clots.


ALLERGIC/IMMUNOLOGIC: No hives or skin allergy.





*Physical Exam





- Vital Signs


 Last Vital Signs











Temp Pulse Resp BP Pulse Ox


 


 98.6 F   71   16   124/50 L  100 


 


 02/07/19 11:29  02/07/19 11:29  02/07/19 11:29  02/07/19 11:29  02/07/19 11:29














- Physical Exam


Comments: 





02/07/19 13:32


GENERAL: Awake, alert, and fully oriented, in no acute distress


HEAD: No signs of trauma, normocephalic, atraumatic 


EYES: PERRLA, EOMI, sclera anicteric, conjunctiva clear


ENT: Auricles normal inspection, hearing grossly normal, nares patent, 

oropharynx clear without


exudates. Moist mucosa


NECK: Normal ROM, supple, no lymphadenopathy, JVD, or masses


LUNGS: No distress, speaks full sentences, clear to auscultation bilaterally 


HEART: Regular rate and rhythm, normal S1 and S2, no murmurs, rubs or gallops, 

peripheral pulses normal and equal bilaterally. 


ABDOMEN: Soft, nontender, normoactive bowel sounds. No guarding, no rebound. No 

masses


EXTREMITIES: Normal inspection, Normal range of motion, no edema. No clubbing 

or cyanosis. 


NEUROLOGICAL: Cranial nerves II through XII grossly intact. Normal speech, 

normal gait, no focal sensorimotor deficits 


SKIN: Warm, Dry, normal turgor, no rashes or lesions noted. 


RECTAL: No external hemorrhoids noted, good tone, no blood noted on glove 

during exam.





Moderate Sedation





- Procedure Monitoring


Vital Signs: 


Procedure Monitoring Vital Signs











Temperature  98.6 F   02/07/19 11:29


 


Pulse Rate  71   02/07/19 11:29


 


Respiratory Rate  16   02/07/19 11:29


 


Blood Pressure  124/50 L  02/07/19 11:29


 


O2 Sat by Pulse Oximetry (%)  100   02/07/19 11:29











ED Treatment Course





- LABORATORY


CBC & Chemistry Diagram: 


 02/07/19 12:20





 02/07/19 12:20





Medical Decision Making





- Medical Decision Making





02/07/19 14:32


Ms. Ospina is a 80 yo female w/ pmh as described who presents for evaluation of 

generalized weakness and small blood noted on stool. Patient extremely well 

appearing and strength intact upon exam. Labs grossly wnl as below. No concern 

for acute process at this time. Will consult with PCP and discharge for further 

outpatient follow-up.





02/07/19 14:46


Discharging patient to home. Will update PCP upon patient presentation.





02/07/19 14:57


Discussed patient with PCP covering provider who will relay to PCP for 

outpatient F/U. 





 Laboratory Results - last 24 hr











  02/07/19 02/07/19 02/07/19





  12:20 12:20 12:20


 


WBC  9.2  


 


RBC  4.13  


 


Hgb  11.4  


 


Hct  35.0  


 


MCV  84.7  


 


MCH  27.5  


 


MCHC  32.4  


 


RDW  16.0 H  


 


Plt Count  277  D  


 


MPV  8.5  


 


Absolute Neuts (auto)  6.2  


 


Neutrophils %  67.1  


 


Lymphocytes %  19.2  


 


Monocytes %  10.3 H  


 


Eosinophils %  2.4  D  


 


Basophils %  1.0  


 


Nucleated RBC %  0  


 


PT with INR   16.60 H 


 


INR   1.40 H 


 


Sodium    141


 


Potassium    4.7


 


Chloride    106


 


Carbon Dioxide    29


 


Anion Gap    5 L


 


BUN    16


 


Creatinine    0.7


 


Creat Clearance w eGFR    > 60


 


Random Glucose    95


 


Calcium    9.4


 


Total Bilirubin    0.3


 


AST    15


 


ALT    13


 


Alkaline Phosphatase    89


 


Total Protein    7.5


 


Albumin    3.6


 


Stool Occult Blood   














  02/07/19





  13:20


 


WBC 


 


RBC 


 


Hgb 


 


Hct 


 


MCV 


 


MCH 


 


MCHC 


 


RDW 


 


Plt Count 


 


MPV 


 


Absolute Neuts (auto) 


 


Neutrophils % 


 


Lymphocytes % 


 


Monocytes % 


 


Eosinophils % 


 


Basophils % 


 


Nucleated RBC % 


 


PT with INR 


 


INR 


 


Sodium 


 


Potassium 


 


Chloride 


 


Carbon Dioxide 


 


Anion Gap 


 


BUN 


 


Creatinine 


 


Creat Clearance w eGFR 


 


Random Glucose 


 


Calcium 


 


Total Bilirubin 


 


AST 


 


ALT 


 


Alkaline Phosphatase 


 


Total Protein 


 


Albumin 


 


Stool Occult Blood  Negative




















*DC/Admit/Observation/Transfer


Diagnosis at time of Disposition: 


 Symptom of blood in stool








- Discharge Dispostion


Disposition: HOME





- Referrals


Referrals: 


Radha Kang MD [Primary Care Provider] - 





- Patient Instructions


Printed Discharge Instructions:  DI for Hemorrhoids


Additional Instructions: 


You were evaluated today in the ER for your symptoms. We evaluated you with 

labs as well checked your stool for blood. No concerning findings were found at 

this time. Please follow-up with primary care provider for further evaluation. 

Return to ER if any fever, chills, further blood on stool, or other concerning 

symptoms.





- Post Discharge Activity

## 2019-02-07 NOTE — PDOC
Attending Attestation





- HPI


HPI: 





02/07/19 13:41


The patient is a 79 year old female with a significant past medical history of 

HTN, PAF (on Eliquis), HLD, non-obstructive CAD, non-obstructive carotid plaque

, who presents to the emergency department today complaining of rectal 

bleeding. She reports the presence of blood when wiping. Patient denies any 

pain or change in appetite. Patient states a recent colonoscopy of last May 

2018. 





The patient denies chest pain, shortness of breath, headache and dizziness.


Denies fever, chills, nausea, vomit, diarrhea and constipation.


Denies dysuria, frequency, urgency and hematuria.





Allergies: NKA


PCP: Dr. Kang


GI: Dr. Granda 








- Physicial Exam


PE: 





02/07/19 13:41


GENERAL: The patient is in no acute distress.


HEAD: Normal with no signs of trauma.


EYES: PERRLA, EOMI, sclera anicteric, conjunctiva clear.


ENT: Ears normal, nares patent, oropharynx clear without exudates.


Moist mucous membranes.


NECK: Normal range of motion, supple without lymphadenopathy, JVD, or masses.


LUNGS: Breath sounds equal, clear to auscultation bilaterally. No


wheezes, and no crackles.


HEART:Regular rate and rhythm, normal S1 and S2 without murmur, rub or gallop.


ABDOMEN: Soft, nontender, normoactive bowel sounds. No guarding, no


rebound. No masses palpable.


RECTAL: Exam as per resident note. 


EXTREMITIES: Normal range of motion, no edema. No clubbing or


cyanosis. No erythema, or tenderness.


NEUROLOGICAL: Cranial nerves II through XII grossly intact. Normal


speech. No focal neurological deficits.


MUSCULOSKELETAL: Back non-tender to palpation, no CVA tenderness


SKIN: Warm, Dry, normal turgor, no rashes or lesions noted.








<Evelin Alvarado - Last Filed: 02/07/19 13:43>





- Resident


Resident Name: Robles Saini





- ED Attending Attestation


I have performed the following: I have examined & evaluated the patient, The 

case was reviewed & discussed with the resident, I agree w/resident's findings 

& plan, Exceptions are as noted





- Medical Decision Making





02/07/19 14:36


 Laboratory Tests











  12/27/18 12/27/18 02/07/19





  06:00 07:00 12:20


 


WBC   8.9  9.2


 


Hgb   9.9 L  11.4


 


Hct   31.3 L  35.0


 


Plt Count   222  D  277  D


 


INR   


 


Sodium   


 


Potassium   


 


Chloride   


 


Carbon Dioxide   


 


BUN  17  


 


Creatinine  0.8  


 


Stool Occult Blood   














  02/07/19 02/07/19 02/07/19





  12:20 12:20 13:20


 


WBC   


 


Hgb   


 


Hct   


 


Plt Count   


 


INR  1.40 H  


 


Sodium   141 


 


Potassium   4.7 


 


Chloride   106 


 


Carbon Dioxide   29 


 


BUN   16 


 


Creatinine   0.7 


 


Stool Occult Blood    Negative








PT noted rectal bleeding today after a bowel movement when she wiped 


Toilet bowl not full of blood


No lightheadedness or dizziness


No syncope


No vomiting or nausea


Tolerating po





Will discharge to home


Follow up with PMD


Return to the ER for any other concerns or complaints





<Pennie Tom - Last Filed: 02/07/19 14:48>





Attestations





- Attestations





02/07/19 13:42





Documentation prepared by Evelin Alvarado, acting as medical scribe for 

Pennie Tom MD.





<Evelin Alvarado - Last Filed: 02/07/19 13:43>

## 2019-02-07 NOTE — EKG
Test Reason : 

Blood Pressure : ***/*** mmHG

Vent. Rate : 065 BPM     Atrial Rate : 065 BPM

   P-R Int : 232 ms          QRS Dur : 138 ms

    QT Int : 486 ms       P-R-T Axes : 081 -14 129 degrees

   QTc Int : 505 ms

 

occasional atrial pacing

SINUS RHYTHM WITH SINUS ARRHYTHMIA WITH 1ST DEGREE A-V BLOCK

LEFT BUNDLE BRANCH BLOCK

ABNORMAL ECG

Confirmed by JACKY OLIVEROS MD ([id number]) on 2/7/2019 4:40:32 PM

 

Referred By:             Confirmed By:JACKY OLIVEROS MD

## 2019-09-17 ENCOUNTER — HOSPITAL ENCOUNTER (EMERGENCY)
Dept: HOSPITAL 74 - JER | Age: 79
Discharge: HOME | End: 2019-09-17
Payer: COMMERCIAL

## 2019-09-17 VITALS — SYSTOLIC BLOOD PRESSURE: 118 MMHG | DIASTOLIC BLOOD PRESSURE: 91 MMHG | HEART RATE: 61 BPM | TEMPERATURE: 98.3 F

## 2019-09-17 VITALS — BODY MASS INDEX: 25.7 KG/M2

## 2019-09-17 DIAGNOSIS — J06.9: Primary | ICD-10-CM

## 2019-09-17 DIAGNOSIS — I48.0: ICD-10-CM

## 2019-09-17 DIAGNOSIS — Z85.828: ICD-10-CM

## 2019-09-17 DIAGNOSIS — Z79.01: ICD-10-CM

## 2019-09-17 DIAGNOSIS — I25.10: ICD-10-CM

## 2019-09-17 DIAGNOSIS — I10: ICD-10-CM

## 2019-09-17 DIAGNOSIS — Z95.1: ICD-10-CM

## 2019-09-17 LAB
ALBUMIN SERPL-MCNC: 3.4 G/DL (ref 3.4–5)
ALP SERPL-CCNC: 77 U/L (ref 45–117)
ALT SERPL-CCNC: 17 U/L (ref 13–61)
ANION GAP SERPL CALC-SCNC: 8 MMOL/L (ref 8–16)
APPEARANCE UR: CLEAR
AST SERPL-CCNC: 20 U/L (ref 15–37)
BACTERIA # UR AUTO: 3 /HPF
BASOPHILS # BLD: 1.3 % (ref 0–2)
BILIRUB SERPL-MCNC: 0.2 MG/DL (ref 0.2–1)
BILIRUB UR STRIP.AUTO-MCNC: NEGATIVE MG/DL
BUN SERPL-MCNC: 15.6 MG/DL (ref 7–18)
CALCIUM SERPL-MCNC: 9.1 MG/DL (ref 8.5–10.1)
CASTS URNS QL MICRO: 1 /LPF (ref 0–8)
CHLORIDE SERPL-SCNC: 104 MMOL/L (ref 98–107)
CO2 SERPL-SCNC: 27 MMOL/L (ref 21–32)
COLOR UR: YELLOW
CREAT SERPL-MCNC: 0.7 MG/DL (ref 0.55–1.3)
DEPRECATED RDW RBC AUTO: 14.9 % (ref 11.6–15.6)
EOSINOPHIL # BLD: 2.3 % (ref 0–4.5)
EPITH CASTS URNS QL MICRO: 0.3 /HPF
GLUCOSE SERPL-MCNC: 98 MG/DL (ref 74–106)
HCT VFR BLD CALC: 34.4 % (ref 32.4–45.2)
HGB BLD-MCNC: 11.3 GM/DL (ref 10.7–15.3)
KETONES UR QL STRIP: NEGATIVE
LEUKOCYTE ESTERASE UR QL STRIP.AUTO: NEGATIVE
LYMPHOCYTES # BLD: 18 % (ref 8–40)
MCH RBC QN AUTO: 29.1 PG (ref 25.7–33.7)
MCHC RBC AUTO-ENTMCNC: 32.8 G/DL (ref 32–36)
MCV RBC: 88.7 FL (ref 80–96)
MONOCYTES # BLD AUTO: 10.8 % (ref 3.8–10.2)
NEUTROPHILS # BLD: 67.6 % (ref 42.8–82.8)
NITRITE UR QL STRIP: NEGATIVE
PH UR: 5.5 [PH] (ref 5–8)
PLATELET # BLD AUTO: 228 K/MM3 (ref 134–434)
PMV BLD: 9 FL (ref 7.5–11.1)
POTASSIUM SERPLBLD-SCNC: 4.2 MMOL/L (ref 3.5–5.1)
PROT SERPL-MCNC: 7.2 G/DL (ref 6.4–8.2)
PROT UR QL STRIP: NEGATIVE
PROT UR QL STRIP: NEGATIVE
RBC # BLD AUTO: 3.87 M/MM3 (ref 3.6–5.2)
RBC # BLD AUTO: 30 /HPF (ref 0–4)
SODIUM SERPL-SCNC: 138 MMOL/L (ref 136–145)
SP GR UR: 1.02 (ref 1.01–1.03)
UROBILINOGEN UR STRIP-MCNC: 0.2 MG/DL (ref 0.2–1)
WBC # BLD AUTO: 10.2 K/MM3 (ref 4–10)
WBC # UR AUTO: 1 /HPF (ref 0–5)

## 2019-09-17 NOTE — EKG
Test Reason : 

Blood Pressure : ***/*** mmHG

Vent. Rate : 060 BPM     Atrial Rate : 060 BPM

   P-R Int : 280 ms          QRS Dur : 140 ms

    QT Int : 518 ms       P-R-T Axes : 040 -15 174 degrees

   QTc Int : 518 ms

 

Atrial-paced rhythm with prolonged AV conduction

LEFT BUNDLE BRANCH BLOCK

ABNORMAL ECG

WHEN COMPARED WITH ECG OF 07-FEB-2019 11:31,

ELECTRONIC ATRIAL PACEMAKER HAS REPLACED SINUS RHYTHM

Confirmed by Meir Alves ([id number]) on 9/17/2019 4:28:11 PM

 

Referred By:             Confirmed By:Meir Alves

## 2019-09-17 NOTE — PDOC
History of Present Illness





- General


Chief Complaint: Cold Symptoms


Stated Complaint: BACK PAIN


Time Seen by Provider: 09/17/19 14:05


History Source: Patient


Exam Limitations: No Limitations





- History of Present Illness


Initial Comments: 





09/17/19 16:06


79-year-old female with history of COPD presents to ED with nasal congestion, 

sneezing and cough. Patient denies fever but states did have achy joints 

yesterday and so took Tylenol with good effect. Patient has no other complaints 

at this time. 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 





Is this a multiple visit Asthma Patient?: No


Timing/Duration: reports: yesterday


Severity: reports: mild


Possible Cause: Yes: occasional episodes


Modifying Factors: improves with: coughing


Associated Symptoms: reports: cough, muscle aches, nasal congestion





Past History





- Travel


Traveled outside of the country in the last 30 days: No


Close contact w/someone who was outside of country & ill: No





- Past Medical History


Allergies/Adverse Reactions: 


 Allergies











Allergy/AdvReac Type Severity Reaction Status Date / Time


 


Penicillins AdvReac Mild Itching Verified 09/17/19 14:11











Home Medications: 


Ambulatory Orders





Sotalol HCl [Betapace -] 80 mg PO BID #28 tablet 10/19/14 


Valsartan [Diovan] 160 mg PO DAILY #14 tablet 07/24/15 


Diltiazem Cd [Cardizem Cd -] 360 mg PO DAILY #30 cap.cd.24h 01/05/17 


Pravastatin Sodium [Pravachol -] 40 mg PO HS 05/10/17 


Apixaban [Eliquis -] 5 mg PO BID  tablet 03/14/18 


Esomeprazole Magnesium [Nexium 24Hr] 20 mg PO DAILY #30 capsule. 04/17/18 


Pantoprazole Sodium [Protonix -] 40 mg PO BID #60 tablet.ec 05/05/18 


Sucralfate Oral Suspension [Carafate Oral Suspension -] 1 gm PO BID #1 bottle 05 /05/18 


Sucralfate [Carafate -] 1 gm PO BID #30 tablet 05/05/18 


Isosorbide Dinitrate [Isordil] 5 mg PO BID #60 tablet 12/28/18 


Sotalol HCl [Betapace -] 80 mg PO BID  tablet 12/28/18 








Anemia: No


Asthma: No


Cancer: Yes (SKIN CA- EARLOBE)


Cardiac Disorders: Yes (CAD, P A-FIB,CAROTID PLAQUE)


CVA: No


COPD: No


CHF: No


Dementia: No


Diabetes: No


GI Disorders: Yes (gastritis)


 Disorders: No


HTN: Yes


Hypercholesterolemia: Yes


Liver Disease:  (FATTY LIVER)


Seizures: No


Thyroid Disease: No





- Surgical History


Abdominal Surgery: No


Appendectomy: No


Cardiac Surgery: Yes (CABG)


Cholecystectomy: No


Lung Surgery: No


Neurologic Surgery: No


Orthopedic Surgery: Yes





- Family Disease History


Family Disease History: Heart Disease: Father





- Immunization History


Immunization Up to Date: Yes





- Suicide/Smoking/Psychosocial Hx


Smoking Status: Yes


Smoking History: Never smoked


Have you smoked in the past 12 months: No


Number of Cigarettes Smoked Daily: 0


If you are a former smoker, when did you quit?: 25 yrs ago


Information on smoking cessation initiated: No


Hx Alcohol Use: No


Drug/Substance Use Hx: No


Substance Use Type: None


Hx Substance Use Treatment: No


Patient Lives Alone: No


Lives with/in: spouse/SO





**Review of Systems





- Review of Systems


Able to Perform ROS?: No


Constitutional: No: Symptoms Reported


HEENTM: No: Symptoms Reported


Respiratory: No: Symptoms reported


Cardiac (ROS): No: Symptoms Reported


ABD/GI: No: Symptoms Reported


: No: Symptoms Reported


Musculoskeletal: Yes: Joint Pain


Integumentary: No: Symptoms Reported


Neurological: No: Symptoms reported


Endocrine: No: Symptoms Reported





*Physical Exam





- Vital Signs


 Last Vital Signs











Temp Pulse Resp BP Pulse Ox


 


 98.3 F   61   19   118/91   98 


 


 09/17/19 14:05  09/17/19 14:05  09/17/19 14:05  09/17/19 14:05  09/17/19 14:05














- Physical Exam


General Appearance: Yes: Nourished, Appropriately Dressed.  No: Apparent 

Distress


HEENT: positive: EOMI, DEWEY, TMs Normal, Pharynx Normal.  negative: Pale 

Conjunctivae


Neck: positive: Normal Thyroid, Supple


Respiratory/Chest: positive: Lungs Clear, Normal Breath Sounds.  negative: 

Respiratory Distress, Accessory Muscle Use


Cardiovascular: positive: Regular Rhythm, Regular Rate.  negative: Murmur


Gastrointestinal/Abdominal: positive: Soft.  negative: Tenderness


Extremity: positive: Normal Inspection


Integumentary: positive: Normal Color, Warm, Moist


Neurologic: positive: Motor Strength 5/5 (ambulatory)





ED Treatment Course





- LABORATORY


CBC & Chemistry Diagram: 


 09/17/19 14:31





 09/17/19 14:31





- ADDITIONAL ORDERS


Additional order review: 


 Laboratory  Results











  09/17/19





  14:31


 


Sodium  138


 


Potassium  4.2


 


Chloride  104


 


Carbon Dioxide  27


 


Anion Gap  8


 


BUN  15.6


 


Creatinine  0.7


 


Est GFR (CKD-EPI)AfAm  95.51


 


Est GFR (CKD-EPI)NonAf  82.41


 


Random Glucose  98


 


Calcium  9.1


 


Total Bilirubin  0.2


 


AST  20


 


ALT  17


 


Alkaline Phosphatase  77


 


Troponin I  < 0.02


 


Total Protein  7.2


 


Albumin  3.4








 











  09/17/19





  14:31


 


RBC  3.87


 


MCV  88.7


 


MCHC  32.8


 


RDW  14.9


 


MPV  9.0


 


Neutrophils %  67.6


 


Lymphocytes %  18.0


 


Monocytes %  10.8 H


 


Eosinophils %  2.3


 


Basophils %  1.3














Medical Decision Making





- Medical Decision Making





09/17/19 15:31


Chief complaint: URI complaints for the past 2 days with achy joints yesterday 

with good effect after taking Tylenol.


Exam. Patient with normal vital signs normal physical exam


Plan: Labs, urine, chest x-ray and reevaluation


09/17/19 16:33


 Laboratory Tests











  09/17/19 09/17/19





  14:31 14:31


 


WBC  10.2 H 


 


Hgb  11.3 


 


Hct  34.4 


 


Absolute Neuts (auto)  6.9 


 


Monocytes %  10.8 H 


 


Sodium   138


 


Potassium   4.2


 


Chloride   104


 


Carbon Dioxide   27


 


Anion Gap   8


 


BUN   15.6


 


Creatinine   0.7


 


Est GFR (CKD-EPI)AfAm   95.51


 


Est GFR (CKD-EPI)NonAf   82.41


 


Calcium   9.1


 


Total Bilirubin   0.2


 


AST   20


 


Troponin I   < 0.02


 


Albumin   3.4








Patient will be discharged home with recommendations to take Motrin Tylenol 

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


Will call patient with urine results since collection was prolonged


09/17/19 17:33


 Laboratory Tests











  09/17/19





  16:00


 


Urine Blood  2+ H


 


Ur Leukocyte Esterase  Negative


 


Urine WBC (Auto)  1


 


Urine RBC (Auto)  30














*DC/Admit/Observation/Transfer


Diagnosis at time of Disposition: 


 URI, acute








- Discharge Dispostion


Disposition: HOME


Condition at time of disposition: Good





- Referrals


Referrals: 


Radha Kang MD [Primary Care Provider] - 





- Patient Instructions


Printed Discharge Instructions:  DI for Common Cold


Additional Instructions: 


Take Tylenol Motrin discomfort and drink plenty of fluids and rest.


Will call you with urine results.








- Post Discharge Activity


Forms/Work/School Notes:  Back to Work

## 2019-09-17 NOTE — PDOC
Rapid Medical Evaluation


Medical Evaluation: 


 Allergies











Allergy/AdvReac Type Severity Reaction Status Date / Time


 


Penicillins AdvReac Mild Itching Verified 10/30/18 12:13











I have performed a brief in-person evaluation of this patient.


The patient presents with a chief complaint of: hx HTN, PAF (on Eliquis), HLD, 

non-obstructive CAD, non-obstructive carotid plaque presents c/o back pain and 

dry cough x 3days; also with congestion, sneezing; denies abd pain, vomiting, 

diarrhea; states chest hurt when coughing


Pertinent physical exam findings: In nad, lungs clear, states all of her back 

hurts


I have ordered the following: Labs, urine, cxr


The patient will proceed to the ED for further evaluation.





09/17/19 14:05

## 2020-02-09 ENCOUNTER — HOSPITAL ENCOUNTER (INPATIENT)
Dept: HOSPITAL 74 - JER | Age: 80
LOS: 3 days | Discharge: HOME HEALTH | DRG: 291 | End: 2020-02-12
Attending: FAMILY MEDICINE | Admitting: FAMILY MEDICINE
Payer: COMMERCIAL

## 2020-02-09 VITALS — BODY MASS INDEX: 25.7 KG/M2

## 2020-02-09 DIAGNOSIS — I50.31: ICD-10-CM

## 2020-02-09 DIAGNOSIS — Z95.0: ICD-10-CM

## 2020-02-09 DIAGNOSIS — Z85.828: ICD-10-CM

## 2020-02-09 DIAGNOSIS — I25.10: ICD-10-CM

## 2020-02-09 DIAGNOSIS — E78.5: ICD-10-CM

## 2020-02-09 DIAGNOSIS — Z87.891: ICD-10-CM

## 2020-02-09 DIAGNOSIS — K76.0: ICD-10-CM

## 2020-02-09 DIAGNOSIS — J98.11: ICD-10-CM

## 2020-02-09 DIAGNOSIS — K29.70: ICD-10-CM

## 2020-02-09 DIAGNOSIS — I11.0: Primary | ICD-10-CM

## 2020-02-09 DIAGNOSIS — I48.91: ICD-10-CM

## 2020-02-09 LAB
ALBUMIN SERPL-MCNC: 3.4 G/DL (ref 3.4–5)
ALP SERPL-CCNC: 82 U/L (ref 45–117)
ALT SERPL-CCNC: 14 U/L (ref 13–61)
ANION GAP SERPL CALC-SCNC: 8 MMOL/L (ref 8–16)
APPEARANCE UR: CLEAR
AST SERPL-CCNC: 18 U/L (ref 15–37)
BACTERIA # UR AUTO: 4.6 /HPF
BASOPHILS # BLD: 0.8 % (ref 0–2)
BILIRUB SERPL-MCNC: 0.4 MG/DL (ref 0.2–1)
BILIRUB UR STRIP.AUTO-MCNC: NEGATIVE MG/DL
BUN SERPL-MCNC: 11.3 MG/DL (ref 7–18)
CALCIUM SERPL-MCNC: 9.2 MG/DL (ref 8.5–10.1)
CASTS URNS QL MICRO: 0 /LPF (ref 0–8)
CHLORIDE SERPL-SCNC: 106 MMOL/L (ref 98–107)
CO2 SERPL-SCNC: 26 MMOL/L (ref 21–32)
COLOR UR: YELLOW
CREAT SERPL-MCNC: 0.9 MG/DL (ref 0.55–1.3)
DEPRECATED RDW RBC AUTO: 15.6 % (ref 11.6–15.6)
EOSINOPHIL # BLD: 2.2 % (ref 0–4.5)
EPITH CASTS URNS QL MICRO: 0.2 /HPF
GLUCOSE SERPL-MCNC: 101 MG/DL (ref 74–106)
HCT VFR BLD CALC: 36.2 % (ref 32.4–45.2)
HGB BLD-MCNC: 11.4 GM/DL (ref 10.7–15.3)
KETONES UR QL STRIP: NEGATIVE
LEUKOCYTE ESTERASE UR QL STRIP.AUTO: NEGATIVE
LYMPHOCYTES # BLD: 18.1 % (ref 8–40)
MCH RBC QN AUTO: 27.4 PG (ref 25.7–33.7)
MCHC RBC AUTO-ENTMCNC: 31.6 G/DL (ref 32–36)
MCV RBC: 86.8 FL (ref 80–96)
MONOCYTES # BLD AUTO: 9.2 % (ref 3.8–10.2)
NEUTROPHILS # BLD: 69.7 % (ref 42.8–82.8)
NITRITE UR QL STRIP: NEGATIVE
PH UR: 7 [PH] (ref 5–8)
PLATELET # BLD AUTO: 293 K/MM3 (ref 134–434)
PMV BLD: 8.7 FL (ref 7.5–11.1)
POTASSIUM SERPLBLD-SCNC: 4.5 MMOL/L (ref 3.5–5.1)
PROT SERPL-MCNC: 7.5 G/DL (ref 6.4–8.2)
PROT UR QL STRIP: NEGATIVE
PROT UR QL STRIP: NEGATIVE
RBC # BLD AUTO: 10 /HPF (ref 0–4)
RBC # BLD AUTO: 4.17 M/MM3 (ref 3.6–5.2)
SODIUM SERPL-SCNC: 139 MMOL/L (ref 136–145)
SP GR UR: 1 (ref 1.01–1.03)
UROBILINOGEN UR STRIP-MCNC: 0.2 MG/DL (ref 0.2–1)
WBC # BLD AUTO: 12 K/MM3 (ref 4–10)
WBC # UR AUTO: 0 /HPF (ref 0–5)

## 2020-02-09 RX ADMIN — APIXABAN SCH MG: 5 TABLET, FILM COATED ORAL at 21:23

## 2020-02-09 RX ADMIN — SOTALOL HYDROCHLORIDE SCH MG: 80 TABLET ORAL at 21:23

## 2020-02-09 RX ADMIN — PANTOPRAZOLE SODIUM SCH MG: 40 TABLET, DELAYED RELEASE ORAL at 21:23

## 2020-02-09 RX ADMIN — ACETAMINOPHEN PRN MG: 325 TABLET ORAL at 22:30

## 2020-02-09 NOTE — PDOC
Documentation entered by Sha Marcos SCRIBE, acting as scribe for Madina Rose MD.








Madina Rose MD:  This documentation has been prepared by the Hemant saeed Daniel, SCRIBE, under my direction and personally reviewed by me in its 

entirety.  I confirm that the documentation accurately reflects all work, 

treatment, procedures, and medical decision making performed by me.  





Attending Attestation





- Resident


Resident Name: OrlandoJakob





- ED Attending Attestation


I have performed the following: I have examined & evaluated the patient, The 

case was reviewed & discussed with the resident, I agree w/resident's findings 

& plan, Exceptions are as noted





- HPI


HPI: 





02/09/20 11:26


Patient is an 80 year old female with a significant medical history of CAD, HTN

, HLD, afib (eliquis), and pacemaker placement who presents to the ED with SOB 

and pain in shoulders, back, and ribs that began a month ago but is 

significantly worse today. Patient reports generalized weakness and dyspnea on 

exertion. Patient also reports rhinorrhea, which she took sudafed for and it 

has since gotten better. 





Patient denies chest pain, headache, lightheadedness. Denies nausea, vomiting, 

diarrhea, abdominal pain.





Allergies: penicillins


PCP: Radha Kang








- Physicial Exam


PE: 





02/09/20 12:26


GENERAL: Awake, alert, and fully oriented, in no acute distress


HEAD: No signs of trauma


EYES: PERRLA, EOMI, sclera anicteric, conjunctiva clear


ENT: Auricles normal inspection, hearing grossly normal, nares patent, 

oropharynx clear without exudates. Moist mucosa


NECK: Normal ROM, supple, no lymphadenopathy, JVD, or masses


LUNGS: Breath sounds equal, clear to auscultation bilaterally.  No wheezes, and 

no crackles


HEART: Regular rate and rhythm, normal S1 and S2, no murmurs, rubs or gallops


ABDOMEN: Soft, nontender, normoactive bowel sounds.  No guarding, no rebound.  

No masses


EXTREMITIES: Normal range of motion, no edema.  No clubbing or cyanosis. No 

cords, erythema, or tenderness


NEUROLOGICAL: Cranial nerves II through XII grossly intact.  Normal speech, 

normal gait


SKIN: Warm, Dry, normal turgor, no rashes or lesions noted.








- Medical Decision Making





02/09/20 13:01


Pt presents to the ED complaining of generalized weakness and generalized 

malaise.  Also complains of orthopnea and several episodes of shortness of 

breath that are different than her usual.  Labs and CXR are consistent with 

CHF.  Will admit to medicine for continued monitoring and work up. 


02/09/20 13:01





02/09/20 14:12

## 2020-02-09 NOTE — EKG
Test Reason : 

Blood Pressure : ***/*** mmHG

Vent. Rate : 065 BPM     Atrial Rate : 065 BPM

   P-R Int : 100 ms          QRS Dur : 132 ms

    QT Int : 484 ms       P-R-T Axes : 000 -23 234 degrees

   QTc Int : 503 ms

 

SINUS RHYTHM WITH SHORT WV WITH FREQUENT ventricular-paced complexes 

AND PREMATURE SUPRAVENTRICULAR COMPLEXES

NON-SPECIFIC INTRA-VENTRICULAR CONDUCTION BLOCK

INFERIOR INFARCT , AGE UNDETERMINED

ABNORMAL ECG

Confirmed by MD NUPUR, ANNAMARIA ([id number]) on 2/9/2020 2:05:58 PM

 

Referred By:             Confirmed By:ANNAMARIA ESPITIA MD

## 2020-02-09 NOTE — PDOC
History of Present Illness





- General


Chief Complaint: Respiratory


Stated Complaint: FLU SYMPTOMS


Time Seen by Provider: 02/09/20 10:57





- History of Present Illness


Initial Comments: 








Josephine Ospina is a 81 y/o female with pmh significant for a-fib on eliquis, 

pacemaker, arthritis, HTN, HLD, CAD, presenting today with generalized weakness

, shortness of breath on exertion, orthopnea that started yesterday. Also 

reporting diffuse body aches over the past month that worsened today. Denies 

falls. Denies LOC. Denies head trauma. 





Denies chest pain/abdominal stephanie. Denies fever/chills/cough. Denies n/v/d. 

Denies dysuria. 











Past History





- Past Medical History


Allergies/Adverse Reactions: 


 Allergies











Allergy/AdvReac Type Severity Reaction Status Date / Time


 


Penicillins Allergy Mild Itching Verified 02/09/20 14:53











Home Medications: 


Ambulatory Orders





Sotalol HCl [Betapace -] 80 mg PO BID #28 tablet 10/19/14 


Valsartan [Diovan] 160 mg PO DAILY #14 tablet 07/24/15 


Diltiazem Cd [Cardizem Cd -] 360 mg PO DAILY #30 cap.cd.24h 01/05/17 


Pravastatin Sodium [Pravachol -] 40 mg PO HS 05/10/17 


Apixaban [Eliquis -] 5 mg PO BID  tablet 03/14/18 


Esomeprazole Magnesium [Nexium 24Hr] 20 mg PO DAILY #30 capsule.dr 04/17/18 


Pantoprazole Sodium [Protonix -] 40 mg PO BID #60 tablet.ec 05/05/18 


Sucralfate Oral Suspension [Carafate Oral Suspension -] 1 gm PO BID #1 bottle 05 /05/18 


Isosorbide Dinitrate [Isordil] 5 mg PO BID #60 tablet 12/28/18 








Anemia: No


Asthma: No


Cancer: Yes (SKIN CA- EARLOBE)


Cardiac Disorders: Yes (CAD, P A-FIB,CAROTID PLAQUE)


CVA: No


COPD: No


CHF: No


Dementia: No


Diabetes: No


GI Disorders: Yes (gastritis)


 Disorders: No


HTN: Yes


Hypercholesterolemia: Yes


Liver Disease:  (FATTY LIVER)


Seizures: No


Thyroid Disease: No





- Surgical History


Abdominal Surgery: No


Appendectomy: No


Cardiac Surgery: Yes (CABG)


Cholecystectomy: No


Lung Surgery: No


Neurologic Surgery: No


Orthopedic Surgery: Yes





- Immunization History


Immunization Up to Date: Yes





- Psycho Social/Smoking Cessation Hx


Smoking Status: Yes


Smoking History: Never smoked


Have you smoked in the past 12 months: No


Number of Cigarettes Smoked Daily: 0


If you are a former smoker, when did you quit?: 25 yrs ago


Hx Alcohol Use: No


Drug/Substance Use Hx: No


Substance Use Type: None


Hx Substance Use Treatment: No





**Review of Systems





- Review of Systems


Comments:: 





GENERAL/CONSTITUTIONAL: No fever or chills. Reports generalized weakness. 


HEAD, EYES, EARS, NOSE AND THROAT: No change in vision. No change in hearing. 

No sore throat._


CARDIOVASCULAR: No chest pain. Reports shortness of breath. 


RESPIRATORY: Denies cough, hemoptysis_


GASTROINTESTINAL: No nausea, vomiting, diarrhea or constipation._


GENITOURINARY: No dysuria, frequency, or change in urination._


MUSCULOSKELETAL: Reports diffuse body aches. 


SKIN: No rash_


NEUROLOGIC: No headache, vertigo, loss of consciousness, or change in strength/

sensation._


ENDOCRINE: No increased thirst. No abnormal weight change_


HEMATOLOGIC/LYMPHATIC: No anemia, easy bleeding, or history of blood clots._


ALLERGIC/IMMUNOLOGIC: No hives or skin allergy._











*Physical Exam





- Vital Signs


 Last Vital Signs











Temp Pulse Resp BP Pulse Ox


 


 97.6 F   69   18   152/66   95 


 


 02/09/20 09:55  02/09/20 09:55  02/09/20 09:55  02/09/20 09:55  02/09/20 09:55














ED Treatment Course





- LABORATORY


CBC & Chemistry Diagram: 


 02/10/20 07:08





 02/10/20 07:08





Medical Decision Making





- Medical Decision Making





02/09/20 12:20


80F hx of a-fib on eliquis, cad, htn, hld, arthritis, presenting today with SOB 

on exertion, orthopnea, generalized weakness, body aches.


-cbc, cmp, bnp


-ekg, trop, cxr


-ua, ucx


-fluids, tylenol 





02/09/20 12:25


EKG shows 65 bpm, sinus rhythm, paced, PVCs, no ST elevation, QTc 503. 





02/09/20 13:39


CXR shows bibasilar pleural effusions, basilar atelectasis, cardiomegaly, 

pacemaker. 





02/09/20 14:12


Labs reviewed.


D/w NP Arturo Alejandra who accepts the patient for admission for Dr. Luna. 


 Laboratory Last Values











WBC  12.0 K/mm3 (4.0-10.0)  H  02/09/20  11:48    


 


RBC  4.17 M/mm3 (3.60-5.2)   02/09/20  11:48    


 


Hgb  11.4 GM/dL (10.7-15.3)   02/09/20  11:48    


 


Hct  36.2 % (32.4-45.2)   02/09/20  11:48    


 


MCV  86.8 fl (80-96)   02/09/20  11:48    


 


MCH  27.4 pg (25.7-33.7)   02/09/20  11:48    


 


MCHC  31.6 g/dl (32.0-36.0)  L  02/09/20  11:48    


 


RDW  15.6 % (11.6-15.6)   02/09/20  11:48    


 


Plt Count  293 K/MM3 (134-434)  D 02/09/20  11:48    


 


MPV  8.7 fl (7.5-11.1)   02/09/20  11:48    


 


Absolute Neuts (auto)  8.4 K/mm3 (1.5-8.0)  H  02/09/20  11:48    


 


Neutrophils %  69.7 % (42.8-82.8)   02/09/20  11:48    


 


Lymphocytes %  18.1 % (8-40)   02/09/20  11:48    


 


Monocytes %  9.2 % (3.8-10.2)   02/09/20  11:48    


 


Eosinophils %  2.2 % (0-4.5)   02/09/20  11:48    


 


Basophils %  0.8 % (0-2.0)   02/09/20  11:48    


 


Nucleated RBC %  0 % (0-0)   02/09/20  11:48    


 


Creatine Kinase  38 U/L ()   02/09/20  11:15    


 


Troponin I  < 0.02 ng/ml (0.00-0.05)   02/09/20  11:15    


 


B-Natriuretic Peptide  4408.9 pg/ml (5-450)  H  02/09/20  11:15    

















02/09/20 17:56


Labs reviewed.


 Laboratory Last Values











WBC  12.0 K/mm3 (4.0-10.0)  H  02/09/20  11:48    


 


RBC  4.17 M/mm3 (3.60-5.2)   02/09/20  11:48    


 


Hgb  11.4 GM/dL (10.7-15.3)   02/09/20  11:48    


 


Hct  36.2 % (32.4-45.2)   02/09/20  11:48    


 


MCV  86.8 fl (80-96)   02/09/20  11:48    


 


MCH  27.4 pg (25.7-33.7)   02/09/20  11:48    


 


MCHC  31.6 g/dl (32.0-36.0)  L  02/09/20  11:48    


 


RDW  15.6 % (11.6-15.6)   02/09/20  11:48    


 


Plt Count  293 K/MM3 (134-434)  D 02/09/20  11:48    


 


MPV  8.7 fl (7.5-11.1)   02/09/20  11:48    


 


Absolute Neuts (auto)  8.4 K/mm3 (1.5-8.0)  H  02/09/20  11:48    


 


Neutrophils %  69.7 % (42.8-82.8)   02/09/20  11:48    


 


Lymphocytes %  18.1 % (8-40)   02/09/20  11:48    


 


Monocytes %  9.2 % (3.8-10.2)   02/09/20  11:48    


 


Eosinophils %  2.2 % (0-4.5)   02/09/20  11:48    


 


Basophils %  0.8 % (0-2.0)   02/09/20  11:48    


 


Nucleated RBC %  0 % (0-0)   02/09/20  11:48    


 


Sodium  139 mmol/L (136-145)   02/09/20  11:15    


 


Potassium  4.5 mmol/L (3.5-5.1)   02/09/20  11:15    


 


Chloride  106 mmol/L ()   02/09/20  11:15    


 


Carbon Dioxide  26 mmol/L (21-32)   02/09/20  11:15    


 


Anion Gap  8 MMOL/L (8-16)   02/09/20  11:15    


 


BUN  11.3 mg/dL (7-18)   02/09/20  11:15    


 


Creatinine  0.9 mg/dL (0.55-1.3)   02/09/20  11:15    


 


Est GFR (CKD-EPI)AfAm  69.99   02/09/20  11:15    


 


Est GFR (CKD-EPI)NonAf  60.39   02/09/20  11:15    


 


Random Glucose  101 mg/dL ()   02/09/20  11:15    


 


Calcium  9.2 mg/dL (8.5-10.1)   02/09/20  11:15    


 


Total Bilirubin  0.4 mg/dL (0.2-1)   02/09/20  11:15    


 


AST  18 U/L (15-37)   02/09/20  11:15    


 


ALT  14 U/L (13-61)   02/09/20  11:15    


 


Alkaline Phosphatase  82 U/L ()   02/09/20  11:15    


 


Creatine Kinase  38 U/L ()   02/09/20  11:15    


 


Troponin I  < 0.02 ng/ml (0.00-0.05)   02/09/20  11:15    


 


B-Natriuretic Peptide  4408.9 pg/ml (5-450)  H  02/09/20  11:15    


 


Total Protein  7.5 g/dl (6.4-8.2)   02/09/20  11:15    


 


Albumin  3.4 g/dl (3.4-5.0)   02/09/20  11:15    


 


Urine Color  Yellow   02/09/20  11:41    


 


Urine Appearance  Clear   02/09/20  11:41    


 


Urine pH  7.0  (5.0-8.0)  D 02/09/20  11:41    


 


Ur Specific Gravity  1.005  (1.010-1.035)  L  02/09/20  11:41    


 


Urine Protein  Negative  (NEGATIVE)   02/09/20  11:41    


 


Urine Glucose (UA)  Negative  (NEGATIVE)   02/09/20  11:41    


 


Urine Ketones  Negative  (NEGATIVE)   02/09/20  11:41    


 


Urine Blood  2+  (NEGATIVE)  H  02/09/20  11:41    


 


Urine Nitrite  Negative  (NEGATIVE)   02/09/20  11:41    


 


Urine Bilirubin  Negative  (NEGATIVE)   02/09/20  11:41    


 


Urine Urobilinogen  0.2 mg/dL (0.2-1.0)   02/09/20  11:41    


 


Ur Leukocyte Esterase  Negative  (NEGATIVE)   02/09/20  11:41    


 


Urine WBC (Auto)  0 /hpf (0-5)   02/09/20  11:41    


 


Urine RBC (Auto)  10 /hpf (0-4)   02/09/20  11:41    


 


Urine Casts (Auto)  0 /lpf (0-8)   02/09/20  11:41    


 


U Epithel Cells (Auto)  0.2 /HPF (0-5/HPF)   02/09/20  11:41    


 


Urine Bacteria (Auto)  4.6 /hpf (NEGATIVE)   02/09/20  11:41    

















Discharge





- Discharge Information


Problems reviewed: Yes


Clinical Impression/Diagnosis: 


 New onset of congestive heart failure





Condition: Stable





- Admission


Yes





- Follow up/Referral





- Patient Discharge Instructions





- Post Discharge Activity

## 2020-02-10 LAB
ALBUMIN SERPL-MCNC: 3.2 G/DL (ref 3.4–5)
ALP SERPL-CCNC: 75 U/L (ref 45–117)
ALT SERPL-CCNC: 14 U/L (ref 13–61)
ANION GAP SERPL CALC-SCNC: 7 MMOL/L (ref 8–16)
AST SERPL-CCNC: 16 U/L (ref 15–37)
BASOPHILS # BLD: 0.4 % (ref 0–2)
BILIRUB SERPL-MCNC: 0.3 MG/DL (ref 0.2–1)
BUN SERPL-MCNC: 15.5 MG/DL (ref 7–18)
CALCIUM SERPL-MCNC: 8.8 MG/DL (ref 8.5–10.1)
CHLORIDE SERPL-SCNC: 105 MMOL/L (ref 98–107)
CO2 SERPL-SCNC: 27 MMOL/L (ref 21–32)
CREAT SERPL-MCNC: 0.8 MG/DL (ref 0.55–1.3)
DEPRECATED RDW RBC AUTO: 16.1 % (ref 11.6–15.6)
EOSINOPHIL # BLD: 2.6 % (ref 0–4.5)
GLUCOSE SERPL-MCNC: 107 MG/DL (ref 74–106)
HCT VFR BLD CALC: 33.8 % (ref 32.4–45.2)
HGB BLD-MCNC: 11.1 GM/DL (ref 10.7–15.3)
LYMPHOCYTES # BLD: 17 % (ref 8–40)
MCH RBC QN AUTO: 28 PG (ref 25.7–33.7)
MCHC RBC AUTO-ENTMCNC: 33 G/DL (ref 32–36)
MCV RBC: 85 FL (ref 80–96)
MONOCYTES # BLD AUTO: 9 % (ref 3.8–10.2)
NEUTROPHILS # BLD: 71 % (ref 42.8–82.8)
PLATELET # BLD AUTO: 245 K/MM3 (ref 134–434)
PMV BLD: 8.6 FL (ref 7.5–11.1)
POTASSIUM SERPLBLD-SCNC: 3.7 MMOL/L (ref 3.5–5.1)
PROT SERPL-MCNC: 6.8 G/DL (ref 6.4–8.2)
RBC # BLD AUTO: 3.97 M/MM3 (ref 3.6–5.2)
SODIUM SERPL-SCNC: 139 MMOL/L (ref 136–145)
WBC # BLD AUTO: 8.9 K/MM3 (ref 4–10)

## 2020-02-10 RX ADMIN — FUROSEMIDE SCH MG: 10 INJECTION, SOLUTION INTRAVENOUS at 12:04

## 2020-02-10 RX ADMIN — APIXABAN SCH MG: 5 TABLET, FILM COATED ORAL at 09:44

## 2020-02-10 RX ADMIN — SOTALOL HYDROCHLORIDE SCH MG: 80 TABLET ORAL at 21:56

## 2020-02-10 RX ADMIN — ISOSORBIDE DINITRATE SCH MG: 5 TABLET ORAL at 17:57

## 2020-02-10 RX ADMIN — ISOSORBIDE DINITRATE SCH MG: 5 TABLET ORAL at 09:44

## 2020-02-10 RX ADMIN — SUCRALFATE SCH GM: 1 SUSPENSION ORAL at 09:00

## 2020-02-10 RX ADMIN — ACETAMINOPHEN PRN MG: 325 TABLET ORAL at 21:57

## 2020-02-10 RX ADMIN — PANTOPRAZOLE SODIUM SCH MG: 40 TABLET, DELAYED RELEASE ORAL at 21:57

## 2020-02-10 RX ADMIN — SOTALOL HYDROCHLORIDE SCH MG: 80 TABLET ORAL at 09:44

## 2020-02-10 RX ADMIN — APIXABAN SCH MG: 5 TABLET, FILM COATED ORAL at 21:56

## 2020-02-10 RX ADMIN — VALSARTAN SCH MG: 160 TABLET, FILM COATED ORAL at 09:44

## 2020-02-10 RX ADMIN — SUCRALFATE SCH GM: 1 SUSPENSION ORAL at 16:47

## 2020-02-10 RX ADMIN — PANTOPRAZOLE SODIUM SCH MG: 40 TABLET, DELAYED RELEASE ORAL at 09:44

## 2020-02-10 NOTE — CON.CARD
Consult


Consult Specialty:: CV





- History of Present Illness


Chief Complaint: sob


History of Present Illness: 





81 yo F with SOB and pain in shoulders, back, and ribs.





states body aches began a month ago but is significantly worse x1-2d. 


+ generalized weakness and dyspnea on exertion. Patient also reports rhinorrhea





currently comfortable, denies sob.





no palp, syncope





ER:


VSs stable


CXR effusions


BNP 4400 (from 500, 12/18).


trop neg.








PMH: Mild AS, HTN, PPM, PAF, prior cath with nonob dz





- Past Medical History


Cardio/Vascular: Yes: AFIB, CAD, HTN, Hyperlipdemia, Other (PPM)


Gastrointestinal: Yes: Diverticulosis, Hemorrhoids.  No: Constipation


Musculoskeletal: Yes: Osteoarthritis (R shoulder/injury)





- Past Surgical History


Past Surgical History: Yes: Colonoscopy, Oopherectomy (right), Permanent 

Pacemaker, Upper Endoscopy





- Alcohol/Substance Use


Hx Alcohol Use: No


History of Substance Use: reports: None





- Smoking History


Smoking history: Never smoked


Have you smoked in the past 12 months: No


Aproximately how many cigarettes per day: 0


If you are a former smoker, when did you quit?: 25 yrs ago





- Social History


ADL: Independent


History of Recent Travel: No





Home Medications





- Allergies


Allergies/Adverse Reactions: 


 Allergies











Allergy/AdvReac Type Severity Reaction Status Date / Time


 


Penicillins Allergy Mild Itching Verified 02/09/20 14:53














- Home Medications


Home Medications: 


Ambulatory Orders





Sotalol HCl [Betapace -] 80 mg PO BID #28 tablet 10/19/14 


Valsartan [Diovan] 160 mg PO DAILY #14 tablet 07/24/15 


Diltiazem Cd [Cardizem Cd -] 360 mg PO DAILY #30 cap.cd.24h 01/05/17 


Pravastatin Sodium [Pravachol -] 40 mg PO HS 05/10/17 


Apixaban [Eliquis -] 5 mg PO BID  tablet 03/14/18 


Esomeprazole Magnesium [Nexium 24Hr] 20 mg PO DAILY #30 capsule.dr 04/17/18 


Pantoprazole Sodium [Protonix -] 40 mg PO BID #60 tablet.ec 05/05/18 


Sucralfate Oral Suspension [Carafate Oral Suspension -] 1 gm PO BID #1 bottle 05 /05/18 


Isosorbide Dinitrate [Isordil] 5 mg PO BID #60 tablet 12/28/18 











Family Medical History


Family History: Denies (no known cmp)





Review of Systems





- Review of Systems


Constitutional: denies: Chills, Fever


Eyes: denies: Eye Pain


HENT: denies: Nasal Congestion


Neck: denies: Stiffness


Cardiovascular: denies: Palpitations


Respiratory: denies: Orthopnea, PND


Gastrointestinal: denies: Diarrhea, Rectal Bleeding


Genitourinary: denies: Burning, Hematuria


Musculoskeletal: denies: Muscle Pain


Integumentary: denies: Rash


Neurological: denies: Numbness, Seizure, Syncope


Endocrine: denies: Excessive Sweating


Hematology/Lymphatic: denies: Excessive Bleeding


Vital Signs: 


 Vital Signs











Temperature  98.6 F   02/10/20 06:00


 


Pulse Rate  67   02/10/20 06:00


 


Respiratory Rate  20   02/10/20 06:00


 


Blood Pressure  138/73   02/10/20 06:00


 


O2 Sat by Pulse Oximetry (%)  99   02/09/20 14:45











Constitutional: Yes: Well Nourished, No Distress


Eyes: No: Sclera Icterus


HENT: No: Nasal Congestion


Neck: No: Decreased ROM


Respiratory: Yes: Rales (bases).  No: Accessory Muscle Use, Wheezes


Gastrointestinal: Yes: Normal Bowel Sounds.  No: Distention, Hepatomegaly, 

Palpable Mass, Tenderness


Cardiovascular: Yes: Regular Rate and Rhythm


JVD: No


Carotid Bruit: No


PMI: Non-Displaced


Heart Sounds: Yes: S1, S2.  No: Gallop


Murmur: Yes: Systolic Murmur (1/6 early ALEX rusb).  No: Diastolic Murmur


Musculoskeletal: Yes: Other (No kyphosis)


Extremities: No: Cool, Cyanosis


Edema: No


Peripheral Pulses: 2+ Left Carotid, 2+ Right Carotid, 2+ Left Doralis Pedis, 2+ 

Right Dorsalis Pedis


Integumentary: No: Jaundice


Neurological: Yes: Alert.  No: Seizure


Psychiatric: No: Agitated





- Other Data


Labs, Other Data: 


 CBC, BMP





 02/10/20 07:08 





 02/10/20 07:08 





 Troponin, BNP











  02/09/20 02/09/20





  11:15 11:15


 


Troponin I  < 0.02 


 


B-Natriuretic Peptide   4408.9 H








 Troponin, BNP











  02/09/20 02/09/20





  11:15 11:15


 


Troponin I  < 0.02 


 


B-Natriuretic Peptide   4408.9 H














Assessment/Plan





MPI 12/2018 (angie): mild anterior ischemia, nl EF





Echo 12/2018: mod conc LVH. nl EF. nl RV. PM lead seen. mild AS/mild-mod AI. 

small peric eff





Echo 2019: mild LVH, nl EF. nl RV. mod LAE. sclerotic AV no stenosis, mild AI. 

mild MR. mod TR. RVSP 30-40.





CXR: bilat effusions with adjcnt atx, coarse lung markings ? mild congestion





ECG: AFL, V-paced











acute CHF:


-no prior h/o CHF


-echo 12/18 with moderate (conc) LVH, mild AS/mild-mod AI--rpt study


-BNP acutely markedly elevated vs prior, cxr congested


-wt 150 in office 11/19--monitor wts trend here


-start lasix IV 40 qd--suspect she will not need long duration of this dose


-prior cath with nonob CAD; MPI here 12/2018 with mild anterior ischemia--pt 

declined cath at that time. no suspected ACS sx, ecg v-paced. follow troponin.


-given new HFpEF syndrome, should have repeat discussion of ischemia eval and 

possibly revascularization--will defer to dr nogueira who she has 

relationship with when he rounds tomorrow





CAD:


-as above


-cont home bb (sotalol), statin, NOAC





PAF, PPM:


-on sotalol at home--continue same here


-cont home eliquis





HTN:


-bp reasonable


-cont home meds, observe

## 2020-02-10 NOTE — HP
Admitting History and Physical





- Primary Care Physician


PCP: Radha Kang





- Admission


Chief Complaint: Weakness and Fatigue


History of Present Illness: 





Patient is an 79 y/o female with past medical history of Afib on Eliquis, 

Pacemaker, Arthritis, HTN, HLD, CAD.  Pattient presented to [facility] with complaints 

of weakness, fatigue, SOB with exertion for 1 day.  She also complains of pain 

to L rib pain, denies trauma or fall.  She denies chest pain, palpitations, 

dizziness, abdominal pain.


History Source: Patient


Limitations to Obtaining History: No Limitations





- Past Medical History


Cardiovascular: Yes: AFIB, CAD, HTN, Hyperlipdemia, Other (PPM)


Gastrointestinal: Yes: Diverticulosis, Hemorrhoids.  No: Constipation


Musculoskeletal: Yes: Osteoarthritis (R shoulder/injury)





- Past Surgical History


Past Surgical History: Yes: Colonoscopy, Oopherectomy (right), Permanent 

Pacemaker, Upper Endoscopy





- Smoking History


Smoking history: Never smoked


Have you smoked in the past 12 months: No


Aproximately how many cigarettes per day: 0


If you are a former smoker, when did you quit?: 25 yrs ago





- Alcohol/Substance Use


Hx Alcohol Use: No


History of Substance Use: reports: None





- Social History


ADL: Independent


History of Recent Travel: No





Home Medications





- Allergies


Allergies/Adverse Reactions: 


 Allergies











Allergy/AdvReac Type Severity Reaction Status Date / Time


 


Penicillins Allergy Mild Itching Verified 02/09/20 14:53














- Home Medications


Home Medications: 


Ambulatory Orders





Sotalol HCl [Betapace -] 80 mg PO BID #28 tablet 10/19/14 


Valsartan [Diovan] 160 mg PO DAILY #14 tablet 07/24/15 


Diltiazem Cd [Cardizem Cd -] 360 mg PO DAILY #30 cap.cd.24h 01/05/17 


Pravastatin Sodium [Pravachol -] 40 mg PO HS 05/10/17 


Apixaban [Eliquis -] 5 mg PO BID  tablet 03/14/18 


Esomeprazole Magnesium [Nexium 24Hr] 20 mg PO DAILY #30 capsule.dr 04/17/18 


Pantoprazole Sodium [Protonix -] 40 mg PO BID #60 tablet.ec 05/05/18 


Sucralfate Oral Suspension [Carafate Oral Suspension -] 1 gm PO BID #1 bottle 05 /05/18 


Isosorbide Dinitrate [Isordil] 5 mg PO BID #60 tablet 12/28/18 











Review of Systems





- Review of Systems


Constitutional: reports: Weakness


Eyes: reports: No Symptoms


HENT: reports: No Symptoms


Neck: reports: No Symptoms


Cardiovascular: reports: Shortness of Breath


Respiratory: reports: SOB, SOB on Exertion


Gastrointestinal: reports: No Symptoms


Genitourinary: reports: No Symptoms


Breasts: reports: No Symptoms Reported


Musculoskeletal: reports: Back Pain


Integumentary: reports: No Symptoms


Neurological: reports: No Symptoms


Endocrine: reports: No Symptoms


Hematology/Lymphatic: reports: No Symptoms


Psychiatric: reports: No Symptoms





Physical Examination


Vital Signs: 


 Vital Signs











Temperature  98.0 F   02/10/20 09:09


 


Pulse Rate  62   02/10/20 09:09


 


Respiratory Rate  20   02/10/20 09:09


 


Blood Pressure  159/64   02/10/20 09:09


 


O2 Sat by Pulse Oximetry (%)  95   02/10/20 09:09











Constitutional: Yes: No Distress, Calm


Eyes: Yes: Conjunctiva Clear


HENT: Yes: Atraumatic


Neck: Yes: Supple


Cardiovascular: Yes: Regular Rate and Rhythm


Respiratory: Yes: Regular, Diminished


Gastrointestinal: Yes: Normal Bowel Sounds, Soft


Musculoskeletal: Yes: Muscle Weakness


Extremities: Yes: WNL


Edema: No


Neurological: Yes: Alert


Psychiatric: Yes: Alert


Labs: 


 CBC, BMP





 02/10/20 07:08 





 02/10/20 07:08 











Imaging





- Results


Chest X-ray: Report Reviewed





Problem List





- Problems


(1) New onset of congestive heart failure


Assessment/Plan: 


-Cardiology on board


-BNP 4408.9


-Lasix IV 


-daily weights 


-strict I&Os


-CXR shows coarse lung changes with bibasilar pleural effusions and some 

basilar atelectasis, interstitial markings have increased somewhat


-Echo pending





Code(s): I50.9 - HEART FAILURE, UNSPECIFIED   





(2) Atrial fibrillation


Assessment/Plan: 


-Eliquis 


-Cardizem for rate control


Code(s): I48.91 - UNSPECIFIED ATRIAL FIBRILLATION   


Qualifiers: 


   Atrial fibrillation type: paroxysmal   Qualified Code(s): I48.0 - Paroxysmal 

atrial fibrillation   





(3) Coronary artery disease


Assessment/Plan: 


-Sotalol, Eliquis


Code(s): I25.10 - ATHSCL HEART DISEASE OF NATIVE CORONARY ARTERY W/O ANG PCTRS 

  


Qualifiers: 


   Coronary Disease-Associated Artery/Lesion type: native artery   Native vs. 

transplanted heart: native heart   Associated angina: with stable angina   

Qualified Code(s): I25.118 - Atherosclerotic heart disease of native coronary 

artery with other forms of angina pectoris   





(4) GERD (gastroesophageal reflux disease)


Assessment/Plan: 


-Pantoprazole


Code(s): K21.9 - GASTRO-ESOPHAGEAL REFLUX DISEASE WITHOUT ESOPHAGITIS   


Qualifiers: 


   Esophagitis presence: esophagitis presence not specified   Qualified Code(s)

: K21.9 - Gastro-esophageal reflux disease without esophagitis   





(5) Hyperlipidemia


Assessment/Plan: 


-Pravachol


Code(s): E78.5 - HYPERLIPIDEMIA, UNSPECIFIED   


Qualifiers: 


   Hyperlipidemia type: pure hypercholesterolemia   Qualified Code(s): E78.00 - 

Pure hypercholesterolemia, unspecified; E78.0 - Pure hypercholesterolemia   





(6) Hypertension


Assessment/Plan: 


-Diovan, Isosorbide 


-low Na diet


Code(s): I10 - ESSENTIAL (PRIMARY) HYPERTENSION   


Qualifiers: 


   Hypertension type: essential hypertension   Qualified Code(s): I10 - 

Essential (primary) hypertension

## 2020-02-11 LAB
ALBUMIN SERPL-MCNC: 3.1 G/DL (ref 3.4–5)
ALP SERPL-CCNC: 78 U/L (ref 45–117)
ALT SERPL-CCNC: 15 U/L (ref 13–61)
ANION GAP SERPL CALC-SCNC: 7 MMOL/L (ref 8–16)
AST SERPL-CCNC: 28 U/L (ref 15–37)
BILIRUB SERPL-MCNC: 0.4 MG/DL (ref 0.2–1)
BUN SERPL-MCNC: 15.5 MG/DL (ref 7–18)
CALCIUM SERPL-MCNC: 9 MG/DL (ref 8.5–10.1)
CHLORIDE SERPL-SCNC: 104 MMOL/L (ref 98–107)
CO2 SERPL-SCNC: 29 MMOL/L (ref 21–32)
CREAT SERPL-MCNC: 0.8 MG/DL (ref 0.55–1.3)
DEPRECATED RDW RBC AUTO: 16 % (ref 11.6–15.6)
GLUCOSE SERPL-MCNC: 100 MG/DL (ref 74–106)
HCT VFR BLD CALC: 37.5 % (ref 32.4–45.2)
HGB BLD-MCNC: 12 GM/DL (ref 10.7–15.3)
MCH RBC QN AUTO: 27.8 PG (ref 25.7–33.7)
MCHC RBC AUTO-ENTMCNC: 32.1 G/DL (ref 32–36)
MCV RBC: 86.4 FL (ref 80–96)
PLATELET # BLD AUTO: 243 K/MM3 (ref 134–434)
PMV BLD: 8.9 FL (ref 7.5–11.1)
POTASSIUM SERPLBLD-SCNC: 4.1 MMOL/L (ref 3.5–5.1)
PROT SERPL-MCNC: 7.2 G/DL (ref 6.4–8.2)
RBC # BLD AUTO: 4.34 M/MM3 (ref 3.6–5.2)
SODIUM SERPL-SCNC: 139 MMOL/L (ref 136–145)
WBC # BLD AUTO: 8 K/MM3 (ref 4–10)

## 2020-02-11 RX ADMIN — SOTALOL HYDROCHLORIDE SCH MG: 80 TABLET ORAL at 11:09

## 2020-02-11 RX ADMIN — APIXABAN SCH MG: 5 TABLET, FILM COATED ORAL at 11:11

## 2020-02-11 RX ADMIN — ISOSORBIDE DINITRATE SCH MG: 5 TABLET ORAL at 17:53

## 2020-02-11 RX ADMIN — SOTALOL HYDROCHLORIDE SCH MG: 80 TABLET ORAL at 22:14

## 2020-02-11 RX ADMIN — ISOSORBIDE DINITRATE SCH MG: 5 TABLET ORAL at 11:10

## 2020-02-11 RX ADMIN — SUCRALFATE SCH GM: 1 SUSPENSION ORAL at 17:53

## 2020-02-11 RX ADMIN — PANTOPRAZOLE SODIUM SCH MG: 40 TABLET, DELAYED RELEASE ORAL at 11:10

## 2020-02-11 RX ADMIN — SUCRALFATE SCH GM: 1 SUSPENSION ORAL at 06:19

## 2020-02-11 RX ADMIN — VALSARTAN SCH MG: 160 TABLET, FILM COATED ORAL at 11:10

## 2020-02-11 RX ADMIN — LIDOCAINE SCH PATCH: 50 PATCH TOPICAL at 11:06

## 2020-02-11 RX ADMIN — APIXABAN SCH MG: 5 TABLET, FILM COATED ORAL at 22:14

## 2020-02-11 RX ADMIN — FUROSEMIDE SCH MG: 10 INJECTION, SOLUTION INTRAVENOUS at 11:11

## 2020-02-11 RX ADMIN — PANTOPRAZOLE SODIUM SCH MG: 40 TABLET, DELAYED RELEASE ORAL at 22:14

## 2020-02-11 NOTE — PN
Progress Note (short form)





- Note


Progress Note: 





PULMONARY





CONSULTATION DICTATED 2/11/20





IMP DYSPNEA


     ACUTE CHF


     ASHD


     ILD


     PAF


     S/P PPM


     HTN


     HLD





PLAN O2


        LASIX


        CARDIAC W/U 


        PFTS


        CHEST CT


       





DR SEWELL





Problem List





- Problems


(1) New onset of congestive heart failure


Code(s): I50.9 - HEART FAILURE, UNSPECIFIED   





(2) Afib


Code(s): I48.91 - UNSPECIFIED ATRIAL FIBRILLATION   


Qualifiers: 


   Atrial fibrillation type: paroxysmal   Qualified Code(s): I48.0 - Paroxysmal 

atrial fibrillation   





(3) Coronary artery disease


Code(s): I25.10 - ATHSCL HEART DISEASE OF NATIVE CORONARY ARTERY W/O ANG PCTRS 

  


Qualifiers: 


   Coronary Disease-Associated Artery/Lesion type: native artery   Native vs. 

transplanted heart: native heart   Associated angina: with stable angina   

Qualified Code(s): I25.118 - Atherosclerotic heart disease of native coronary 

artery with other forms of angina pectoris   





(4) Hyperlipidemia


Code(s): E78.5 - HYPERLIPIDEMIA, UNSPECIFIED   


Qualifiers: 


   Hyperlipidemia type: pure hypercholesterolemia   Qualified Code(s): E78.00 - 

Pure hypercholesterolemia, unspecified; E78.0 - Pure hypercholesterolemia   





(5) Hypertension


Code(s): I10 - ESSENTIAL (PRIMARY) HYPERTENSION   


Qualifiers: 


   Hypertension type: essential hypertension   Qualified Code(s): I10 - 

Essential (primary) hypertension   





(6) Pacemaker


Code(s): Z95.0 - PRESENCE OF CARDIAC PACEMAKER   





(7) Shoulder pain


Code(s): M25.519 - PAIN IN UNSPECIFIED SHOULDER   


Qualifiers: 


   Chronicity: chronic   Laterality: right   Qualified Code(s): M25.511 - Pain 

in right shoulder; G89.29 - Other chronic pain; G89.29 - Other chronic pain

## 2020-02-11 NOTE — ECHO
Version:  1



Name:  ANA SELLERS                                 Exam: Adult Echocardiogram

MRN:  [id number]                                       Study Date:  2020, 9:51 AM

Age:  80 Years



 ____________________________________________________________________________________________________
__________



MMode/2D Measurements & Calculations





IVSd: 1.56 cm                                          LVIDs: 2.37 cm

LVIDd: 4.0 cm

LVPWd: 1.26 cm

LAV (MOD-bp):  108.0 ml

ACS: 1.77 cm                                           Ao root diam: 3.2 cm

LVOT diam: 1.68 cm                                     LA dimension: 4.7 cm



Doppler Measurements & Calculations



MV E max steve: 96.3 cm/sec                              MVA(VTI): 1.51 cm

MV A max steve: 142.5 cm/sec                             MV V2 max: 146.7 cm/sec

MV mean PG: 3.5 mmHg                                   MV max P.6 mmHg

MV E/A: 0.68                                           Med E/e':  42.9

Lat E/e':  29.9                                        Med Peak E' Steve:  2.24 cm/sec

Lat Peak E' Steve:  3.2 cm/sec

MR max P.9 mmHg

Ao max P.9 mmHg                                   DAVID(I,D): 1.89 cm

Ao mean P.3 mmHg                                   LV V1 mean: 93.0 cm/sec

Ao V2 max: 186.7 cm/sec                                LV V1 mean P.1 mmHg

AI P1/2t: 544.8 msec

PI end-d steve: 109.9 cm/sec                             TR max steve: 263.7 cm/sec

                                                       TR max P.8 mmHg



 Procedure

 A complete two-dimensional transthoracic echocardiogram was performed (2D, M-mode, Doppler and color
 flow

 Doppler).

 Left Ventricle

 There is mild concentric left ventricular hypertrophy. Ejection Fraction = 55%. The left ventricular
 ejection

 fraction is normal. Grade I diastolic dysfunction, (abnormal relaxation pattern).

 Right Ventricle

 The right ventricle is normal in size and function. There is a pacemaker lead in the right ventricle
.

 Atria

 The left atrium is moderately dilated. Right atrial size is normal.

 Mitral Valve

 There is moderate mitral annular calcification. There is mild mitral regurgitation.

 Tricuspid Valve

 The tricuspid valve is normal in structure and function. There is mild tricuspid regurgitation. Righ
t

 ventricular systolic pressure is 38 mmhg.

 Aortic Valve

 There is moderate aortic sclerosis.;. Moderate aortic regurgitation.

 Pulmonic Valve

 The pulmonic valve is normal in structure and function.

 Great Vessels

 The aortic root is normal size.

 Pericardium/Pleura

 Small pericardial effusion (<1cm).







 Summary Statements

 There is mild concentric left ventricular hypertrophy.

 The left ventricular ejection fraction is normal.

 The right ventricle is normal in size and function.

 The left atrium is moderately dilated.

 There is moderate mitral annular calcification.

 There is mild mitral regurgitation.

 There is moderate aortic sclerosis.;

 Moderate aortic regurgitation.

 Small pericardial effusion (<1cm)





        Meir Alves      2020, 2:01 PM

 Ordering Physician:  Kelly Verma

 Referring Physician:  KELLY BOLAND

 Performed By:  Lucero Kelly

## 2020-02-11 NOTE — CONS
PULMONARY CONSULTATION 

 

DATE OF CONSULTATION:  02/11/2020

 

REFERRING PHYSICIAN:  _____ 

 

HISTORY:  Patient is an 80-year-old [place] female with past medical history atrial

fibrillation on Eliquis, status post permanent pacemaker, arthritis, hypertension,

ASHD, hyperlipidemia.  Admitted to [facility] with complaint of

generalized weakness, shortness of breath for 1 day.  Patient also complained of

left-sided rib pain.  Denies any nausea, vomiting, or diaphoresis.  Denies any

hemoptysis.  On admission, she was noted to have increasing pulmonary vascular

congestion and elevated BNP.  She was evaluated by Cardiology and felt she had new

onset congestive heart failure.  Started on IV Lasix with good clinical response. 

The patient denies any history of recent travel.  There is no history of DVT or PE in

the past.  Denies any previous history of congestive heart failure.  Of note, she

underwent a CAT scan of the chest in October, which revealed evidence of chronic

interstitial lung disease.  Patient states that she did not have any dyspnea on

exertion prior to this most recent episode of 1 day.

 

PAST MEDICAL HISTORY:  Again, includes paroxysmal atrial fibrillation, status post

permanent pacemaker, ASHD, GERD, hyperlipidemia, hypertension, and likely

interstitial lung disease.

 

REVIEW OF SYSTEMS:  No orthopnea, no PND.  Positive dyspnea on exertion.  No cough,

no hemoptysis, no abdominal pain, no lower extremity edema.

 

CURRENT MEDICATIONS:  Include Lidoderm patch, Tylenol, Diovan, Eliquis, DuoNeb,

Betapace, Cardizem, Lasix, Isordil, Carafate, Protonix.

 

SOCIAL HISTORY:  Born in [place].  Moved to the [place] greater than 40 years ago.

 No occupational exposures.

 

PHYSICAL EXAMINATION: 

General:  Patient is a well-developed, well-nourished female awake, alert in no acute

distress.

Vital Signs:  She is currently afebrile.  Blood pressure 146/63, respiratory rate is

18, O2 saturation is 98% on room air.

HEENT:  Normocephalic, atraumatic.

Neck:  Supple without any adenopathy.

Heart:  Regular S1, S2.

Chest:  A few bibasilar crackles.  

Abdomen:  Soft.  Bowel sounds are positive.

Extremities:  No cyanosis or edema. 

 

LABORATORIES:  BUN 15, creatinine 0.8, WBC is 8, hemoglobin 12, hematocrit 37.5 with

a platelet count of 243,000.  BNP 4408.

 

Chest x-ray:  Cardiomegaly.  Question bibasilar pleural effusions.

 

IMPRESSION: 

1.  Dyspnea secondary to acute congestive heart failure.

2.  Arteriosclerotic heart disease.

3.  Interstitial lung disease.

4.  Paroxysmal atrial fibrillation.

5.  Status post permanent pacemaker.

6.  Hypertension.

7.  Hyperlipidemia.

 

PLAN:  Supplemental O2.  Lasix.  Further cardiac workup as per Cardiology.  Obtain

PFTs.  Follow up chest CT.

 

 

DANUTA SEWELL M.D.

 

[id number]

DD: 02/11/2020 15:10

DT: 02/11/2020 15:53

Job #:  [id number]

## 2020-02-11 NOTE — PN
Progress Note, Physician


Chief Complaint: 





AWAKE ALERT EVENTS AND NOTES REVIEWED


DENIES CHEST PAIN


DYSPNEA ON EXERTION +








- Current Medication List


Current Medications: 


Active Medications





Acetaminophen (Tylenol -)  650 mg PO Q4H PRN


   PRN Reason: PAIN


   Last Admin: 02/10/20 21:57 Dose:  650 mg


Albuterol/Ipratropium (Duoneb -)  1 amp NEB Q6H PRN


   PRN Reason: SHORTNESS OF BREATH


Apixaban (Eliquis -)  5 mg PO BID [facility]


   Last Admin: 02/10/20 21:56 Dose:  5 mg


Diltiazem HCl (Cardizem Cd -)  360 mg PO DAILY [facility]


   Last Admin: 02/10/20 09:44 Dose:  360 mg


Furosemide (Lasix Injection -)  40 mg IVPUSH DAILY [facility]


   Last Admin: 02/10/20 12:04 Dose:  40 mg


Isosorbide Dinitrate (Isordil -)  5 mg PO BIDISORDIL [facility]


   Last Admin: 02/10/20 17:57 Dose:  5 mg


Pantoprazole Sodium (Protonix -)  40 mg PO BID [facility]


   Last Admin: 02/10/20 21:57 Dose:  40 mg


Sotalol HCl (Betapace -)  80 mg PO BID [facility]


   Last Admin: 02/10/20 21:56 Dose:  80 mg


Sucralfate (Carafate Oral Suspension -)  1 gm PO BIDAC [facility]


   Last Admin: 02/11/20 06:19 Dose:  1 gm


Valsartan (Diovan -)  160 mg PO DAILY [facility]


   Last Admin: 02/10/20 09:44 Dose:  160 mg











- Objective


Vital Signs: 


 Vital Signs











Temperature  98.4 F   02/11/20 07:00


 


Pulse Rate  64   02/11/20 07:00


 


Respiratory Rate  18   02/11/20 07:00


 


Blood Pressure  139/83   02/11/20 07:00


 


O2 Sat by Pulse Oximetry (%)  98   02/11/20 00:36











Constitutional: Yes: Mild Distress


Cardiovascular: Yes: Pulse Irregular


Respiratory: Yes: Diminished


Gastrointestinal: Yes: Soft, Abdomen, Obese


Genitourinary: Yes: WNL


Musculoskeletal: Yes: Back Pain


Edema: No


Peripheral Pulses WNL: Yes


Integumentary: Yes: WNL


Wound/Incision: Yes: Clean/Dry


Neurological: Yes: WNL


...Motor Strength: WNL


Psychiatric: Yes: WNL


Labs: 


 CBC, BMP





 02/10/20 07:08 





 02/10/20 07:08 











Problem List





- Problems


(1) Diastolic CHF, acute on chronic


Code(s): I50.33 - ACUTE ON CHRONIC DIASTOLIC (CONGESTIVE) HEART FAILURE   





(2) Abdominal distension


Code(s): R14.0 - ABDOMINAL DISTENSION (GASEOUS)   





(3) Abnormal ECG


Code(s): R94.31 - ABNORMAL ELECTROCARDIOGRAM [ECG] [EKG]   





(4) Afib


Code(s): I48.91 - UNSPECIFIED ATRIAL FIBRILLATION   


Qualifiers: 


   Atrial fibrillation type: paroxysmal   Qualified Code(s): I48.0 - Paroxysmal 

atrial fibrillation   





(5) Coronary artery disease


Code(s): I25.10 - ATHSCL HEART DISEASE OF NATIVE CORONARY ARTERY W/O ANG PCTRS 

  


Qualifiers: 


   Coronary Disease-Associated Artery/Lesion type: native artery   Native vs. 

transplanted heart: native heart   Associated angina: with stable angina   

Qualified Code(s): I25.118 - Atherosclerotic heart disease of native coronary 

artery with other forms of angina pectoris   





(6) GERD (gastroesophageal reflux disease)


Code(s): K21.9 - GASTRO-ESOPHAGEAL REFLUX DISEASE WITHOUT ESOPHAGITIS   


Qualifiers: 


   Esophagitis presence: esophagitis presence not specified   Qualified Code(s)

: K21.9 - Gastro-esophageal reflux disease without esophagitis   





(7) Hyperlipidemia


Code(s): E78.5 - HYPERLIPIDEMIA, UNSPECIFIED   


Qualifiers: 


   Hyperlipidemia type: pure hypercholesterolemia   Qualified Code(s): E78.00 - 

Pure hypercholesterolemia, unspecified; E78.0 - Pure hypercholesterolemia   





(8) Hypertension


Code(s): I10 - ESSENTIAL (PRIMARY) HYPERTENSION   


Qualifiers: 


   Hypertension type: essential hypertension   Qualified Code(s): I10 - 

Essential (primary) hypertension   





(9) Pacemaker


Code(s): Z95.0 - PRESENCE OF CARDIAC PACEMAKER   





Assessment/Plan





AGREE WITH IV LASIX 40MG DAILY


ON ELIQUIS FOR AFIB


PT EVAL


OOB TO CHAIR


LIDODERM PATCH


DAILY WEIGHTS


NUTRITION CONSULT TO AVOID READMISSIONS FOR CHF


BASED ON NON-COMPLIANCE TO DIET AND MEDICAL FOLLOW UP.

## 2020-02-11 NOTE — PN
Progress Note, Physician


Chief Complaint: 





no CP or SOB


 Selected Entries











  02/11/20





  09:00


 


Temperature 98.3 F


 


Pulse Rate 86


 


Blood Pressure 146/63


 


O2 Sat by Pulse 98





Oximetry (%) 








 Laboratory Tests











  02/09/20 02/09/20 02/10/20





  11:15 11:15 10:19


 


WBC   


 


Hgb   


 


Plt Count   


 


Sodium   


 


Potassium   


 


Creatinine   


 


Troponin I  < 0.02   < 0.02


 


B-Natriuretic Peptide   4408.9 H 














  02/11/20 02/11/20





  07:50 07:50


 


WBC   8.0


 


Hgb   12.0


 


Plt Count   243


 


Sodium  139 


 


Potassium  4.1 


 


Creatinine  0.8 


 


Troponin I  


 


B-Natriuretic Peptide  











History of Present Illness: 





79 yo F with SOB and pain in shoulders, back, and ribs.





states body aches began a month ago but is significantly worse x1-2d. 


+ generalized weakness and dyspnea on exertion. Patient also reports rhinorrhea





currently comfortable, denies sob.





no palp, syncope





ER:


VSs stable


CXR effusions


BNP 4400 (from 500, 12/18).


trop neg.








PMH: Mild AS, HTN, PPM, PAF, prior cath with nonob d





- Current Medication List


Current Medications: 


Active Medications





Acetaminophen (Tylenol -)  650 mg PO Q4H PRN


   PRN Reason: PAIN


   Last Admin: 02/10/20 21:57 Dose:  650 mg


Albuterol/Ipratropium (Duoneb -)  1 amp NEB Q6H PRN


   PRN Reason: SHORTNESS OF BREATH


Apixaban (Eliquis -)  5 mg PO BID [facility]


   Last Admin: 02/11/20 11:11 Dose:  5 mg


Diltiazem HCl (Cardizem Cd -)  360 mg PO DAILY [facility]


   Last Admin: 02/11/20 11:08 Dose:  360 mg


Furosemide (Lasix Injection -)  40 mg IVPUSH DAILY [facility]


   Last Admin: 02/11/20 11:11 Dose:  40 mg


Isosorbide Dinitrate (Isordil -)  5 mg PO BIDISORDIL [facility]


   Last Admin: 02/11/20 11:10 Dose:  5 mg


Lidocaine (Lidoderm Patch -)  1 patch TP DAILY [facility]


   Last Admin: 02/11/20 11:06 Dose:  1 patch


Miscellaneous (Lidoderm Patch Removal)  1 each MC DAILY@2200 [facility]


Pantoprazole Sodium (Protonix -)  40 mg PO BID [facility]


   Last Admin: 02/11/20 11:10 Dose:  40 mg


Sotalol HCl (Betapace -)  80 mg PO BID [facility]


   Last Admin: 02/11/20 11:09 Dose:  80 mg


Sucralfate (Carafate Oral Suspension -)  1 gm PO BIDAC [facility]


   Last Admin: 02/11/20 06:19 Dose:  1 gm


Valsartan (Diovan -)  160 mg PO DAILY [facility]


   Last Admin: 02/11/20 11:10 Dose:  160 mg











- Objective


Vital Signs: 


 Vital Signs











Temperature  98.3 F   02/11/20 09:00


 


Pulse Rate  86   02/11/20 09:00


 


Respiratory Rate  18   02/11/20 09:00


 


Blood Pressure  146/63   02/11/20 09:00


 


O2 Sat by Pulse Oximetry (%)  98   02/11/20 09:00











Constitutional: Yes: No Distress


Eyes: Yes: Conjunctiva Clear


Cardiovascular: Yes: Regular Rate and Rhythm


Respiratory: Yes: CTA Bilaterally


Gastrointestinal: Yes: Soft


Edema: No


Neurological: Yes: Alert, Oriented


Labs: 


 CBC, BMP





 02/11/20 07:50 





 02/11/20 07:50 











Assessment/Plan





Assessment/Plan





MPI 12/2018 (angie): mild anterior ischemia, nl EF





Echo 12/2018: mod conc LVH. nl EF. nl RV. PM lead seen. mild AS/mild-mod AI. 

small peric eff





Echo 2019: mild LVH, nl EF. nl RV. mod LAE. sclerotic AV no stenosis, mild AI. 

mild MR. mod TR. RVSP 30-40.





CXR: bilat effusions with adjcnt atx, coarse lung markings ? mild congestion





ECG: AFL, V-paced











acute CHF:


-no prior h/o CHF


-echo 12/18 with moderate (conc) LVH, mild AS/mild-mod AI--rpt study


-BNP acutely markedly elevated vs prior, cxr congested


-wt down


-Appears euvolemic now clinically. Switch to PO 2/12


-prior cath with nonob CAD; MPI here 12/2018 with mild anterior ischemia--pt 

declined cath at that time. no suspected ACS sx, ecg v-paced. TnI negative. 


-Will discuss repeat ischemic evaluation with her as outpatient.








CAD:


-as above


-cont home bb (sotalol), statin, NOAC





PAF, PPM:


-on sotalol at home--continue same here


-cont home eliquis





HTN:


-bp reasonable


-cont home meds, observe

## 2020-02-12 VITALS — DIASTOLIC BLOOD PRESSURE: 51 MMHG | TEMPERATURE: 98.5 F | SYSTOLIC BLOOD PRESSURE: 109 MMHG | HEART RATE: 61 BPM

## 2020-02-12 LAB
ANION GAP SERPL CALC-SCNC: 7 MMOL/L (ref 8–16)
BUN SERPL-MCNC: 17.1 MG/DL (ref 7–18)
CALCIUM SERPL-MCNC: 8.5 MG/DL (ref 8.5–10.1)
CHLORIDE SERPL-SCNC: 103 MMOL/L (ref 98–107)
CO2 SERPL-SCNC: 27 MMOL/L (ref 21–32)
CREAT SERPL-MCNC: 0.8 MG/DL (ref 0.55–1.3)
GLUCOSE SERPL-MCNC: 95 MG/DL (ref 74–106)
POTASSIUM SERPLBLD-SCNC: 3.7 MMOL/L (ref 3.5–5.1)
SODIUM SERPL-SCNC: 138 MMOL/L (ref 136–145)

## 2020-02-12 RX ADMIN — LIDOCAINE SCH PATCH: 50 PATCH TOPICAL at 10:41

## 2020-02-12 RX ADMIN — VALSARTAN SCH MG: 160 TABLET, FILM COATED ORAL at 10:41

## 2020-02-12 RX ADMIN — PANTOPRAZOLE SODIUM SCH MG: 40 TABLET, DELAYED RELEASE ORAL at 10:40

## 2020-02-12 RX ADMIN — SOTALOL HYDROCHLORIDE SCH MG: 80 TABLET ORAL at 10:42

## 2020-02-12 RX ADMIN — SUCRALFATE SCH GM: 1 SUSPENSION ORAL at 06:12

## 2020-02-12 RX ADMIN — APIXABAN SCH MG: 5 TABLET, FILM COATED ORAL at 10:41

## 2020-02-12 RX ADMIN — ISOSORBIDE DINITRATE SCH MG: 5 TABLET ORAL at 10:41

## 2020-02-12 NOTE — DS
Physical Examination


Vital Signs: 


 Vital Signs











Temperature  98.6 F   02/12/20 04:00


 


Pulse Rate  66   02/12/20 04:00


 


Respiratory Rate  18   02/12/20 04:00


 


Blood Pressure  144/67   02/12/20 04:00


 


O2 Sat by Pulse Oximetry (%)  98   02/11/20 09:00











Constitutional: Yes: No Distress


Cardiovascular: Yes: Pulse Irregular


Respiratory: Yes: CTA Bilaterally


Gastrointestinal: Yes: Soft


Renal/: Yes: WNL


Musculoskeletal: Yes: WNL


Extremities: Yes: WNL


Edema: No


Labs: 


 CBC, BMP





 02/11/20 07:50 





 02/12/20 08:54 











Discharge Summary


Problems reviewed: Yes


Reason For Visit: CONGESTIVE HEART FAILURE


Current Active Problems





Diastolic CHF, acute on chronic (Acute)


New onset of congestive heart failure (Acute)








Procedures: Principal: ECHO/CXR


Hospital Course: 


ADMITTED ACUTE CHF TREATED WITH IV LASIX AND 02 SUPPORT


CARDIAC WORKUP


Goals: 


LOW SALT DIET DAILY WEIGHTS


SEE DR RODRIGUEZ IN 1 WEEK FOR LABS CHECK K+


AND RENAL FUNCTION.


LOW SALT DIET





Condition: Improved





- Instructions


Diet, Activity, Other Instructions: 


HOME HEALTH AID FOR ASSISTANCE AND COMPLIANCE WITH MEDS


SEE DR RODRIGUEZ IN 1 WEEK FOR LABS


DAILY WEIGHTS


Referrals: 


Radha Rodriguez MD [Primary Care Provider] - 


Disposition: VNS/HOME HEALTH CARE





- Home Medications


Comprehensive Discharge Medication List: 


Ambulatory Orders





Sotalol HCl [Betapace -] 80 mg PO BID #28 tablet 10/19/14 


Valsartan [Diovan] 160 mg PO DAILY #14 tablet 07/24/15 


Diltiazem Cd [Cardizem Cd -] 360 mg PO DAILY #30 cap.cd.24h 01/05/17 


Pravastatin Sodium [Pravachol -] 40 mg PO HS 05/10/17 


Apixaban [Eliquis -] 5 mg PO BID  tablet 03/14/18 


Esomeprazole Magnesium [Nexium 24Hr] 20 mg PO DAILY #30 capsule. 04/17/18 


Sucralfate Oral Suspension [Carafate Oral Suspension -] 1 gm PO BID #1 bottle 05 /05/18 


Isosorbide Dinitrate [Isordil] 5 mg PO BID #60 tablet 12/28/18 


Acetaminophen [Tylenol .Regular Strength -] 650 mg PO Q4H PRN  tablet 02/12/20 


Albuterol 2.5/Ipratropium 0.5 [Duoneb -] 1 amp NEB Q6H PRN #120 amp 02/12/20 


Furosemide [Lasix -] 40 mg PO DAILY #30 tablet 02/12/20 


Nebulizer [Compact Compressor Nebulizer] 1 each MC BID #1 each 02/12/20

## 2020-02-12 NOTE — PN
Progress Note (short form)





- Note


Progress Note: 


s: no chest pain, palps, dizziness, dyspnea


 Current Medications





Acetaminophen (Tylenol -)  650 mg PO Q4H PRN


   PRN Reason: PAIN


   Last Admin: 02/10/20 21:57 Dose:  650 mg


Albuterol/Ipratropium (Duoneb -)  1 amp NEB Q6H PRN


   PRN Reason: SHORTNESS OF BREATH


Apixaban (Eliquis -)  5 mg PO BID [facility]


   Last Admin: 02/12/20 10:41 Dose:  5 mg


Diltiazem HCl (Cardizem Cd -)  360 mg PO DAILY [facility]


   Last Admin: 02/12/20 10:42 Dose:  360 mg


Furosemide (Lasix -)  40 mg PO DAILY [facility]


   Last Admin: 02/12/20 10:40 Dose:  40 mg


Isosorbide Dinitrate (Isordil -)  5 mg PO BIDISORDIL [facility]


   Last Admin: 02/12/20 10:41 Dose:  5 mg


Lidocaine (Lidoderm Patch -)  1 patch TP DAILY [facility]


   Last Admin: 02/12/20 10:41 Dose:  1 patch


Miscellaneous (Lidoderm Patch Removal)  1 each MC DAILY@2200 [facility]


   Last Admin: 02/11/20 22:15 Dose:  1 each


Pantoprazole Sodium (Protonix -)  40 mg PO BID [facility]


   Last Admin: 02/12/20 10:40 Dose:  40 mg


Sotalol HCl (Betapace -)  80 mg PO BID [facility]


   Last Admin: 02/12/20 10:42 Dose:  80 mg


Sucralfate (Carafate Oral Suspension -)  1 gm PO BIDAC [facility]


   Last Admin: 02/12/20 06:12 Dose:  1 gm


Valsartan (Diovan -)  160 mg PO DAILY [facility]


   Last Admin: 02/12/20 10:41 Dose:  160 mg








 Vital Signs











 Period  Temp  Pulse  Resp  BP Sys/Shipley  Pulse Ox


 


 Last 24 Hr  98.6 F-99.2 F  60-66  18-18  109-144/51-67  














Constitutional: Yes: No Distress


Eyes: Yes: Conjunctiva Clear


Cardiovascular: Yes: Regular Rate and Rhythm


Respiratory: Yes: CTA Bilaterally


Gastrointestinal: Yes: Soft


Edema: No


Neurological: Yes: Alert, Oriented


no jaundice, diaphoresis


not agitated











Assessment/Plan





MPI 12/2018 (angie): mild anterior ischemia, nl EF





Echo 12/2018: mod conc LVH. nl EF. nl RV. PM lead seen. mild AS/mild-mod AI. 

small peric eff





Echo 2019: mild LVH, nl EF. nl RV. mod LAE. sclerotic AV no stenosis, mild AI. 

mild MR. mod TR. RVSP 30-40.





CXR: bilat effusions with adjcnt atx, coarse lung markings ? mild congestion





ECG: AFL, V-paced








acute CHF:


-no prior h/o CHF


-echo 12/18 with moderate (conc) LVH, mild AS/mild-mod AI--rpt study


-BNP acutely markedly elevated vs prior, cxr congested


-wt down


- appears euvolemic, now on PO lasix


-prior cath with nonob CAD; MPI here 12/2018 with mild anterior ischemia--pt 

declined cath at that time. no suspected ACS sx, ecg v-paced. TnI negative. 


-Will discuss repeat ischemic evaluation with her as outpatient - f/u with Dr. Parham scheduled








CAD:


-as above


-cont home bb (sotalol), statin, NOAC





PAF, PPM:


-on sotalol at home--continue same here


-cont home eliquis





HTN:


-bp reasonable


-cont home meds, observe

## 2025-05-20 NOTE — PN
Called pt, left vm informing pt to follow-up wotj physician who ordered this labs as per Dr. Lobato recommendation.    Progress Note, Physician


Chief Complaint: 





TELE: no sig arrhythmias





- Current Medication List


Current Medications: 


Active Medications





Acetaminophen (Tylenol -)  650 mg PO Q6H PRN


   PRN Reason: PAIN OR FEVER


   Last Admin: 12/27/18 18:37 Dose:  650 mg


Al Hydroxide/Mg Hydroxide (Mylanta Oral Suspension -)  30 ml PO Q6H PRN


   PRN Reason: DYSPEPSIA


   Last Admin: 12/26/18 18:32 Dose:  30 ml


Apixaban (Eliquis -)  5 mg PO BID [facility]


   Last Admin: 12/27/18 12:07 Dose:  5 mg


Diltiazem HCl (Cardizem Cd -)  360 mg PO DAILY [facility]


   Last Admin: 12/27/18 12:07 Dose:  360 mg


Isosorbide Dinitrate (Isordil -)  5 mg PO BID [facility]


Pantoprazole Sodium (Protonix -)  40 mg PO BID [facility]


   Last Admin: 12/27/18 21:15 Dose:  40 mg


Sotalol HCl (Betapace -)  80 mg PO BID [facility]


   Last Admin: 12/27/18 21:14 Dose:  80 mg


Valsartan (Diovan -)  160 mg PO DAILY [facility]


   Last Admin: 12/27/18 12:07 Dose:  160 mg











- Objective


Vital Signs: 


 Vital Signs











Temperature  97.9 F   12/28/18 05:49


 


Pulse Rate  66   12/28/18 05:49


 


Respiratory Rate  18   12/28/18 05:49


 


Blood Pressure  116/43 L  12/28/18 05:49


 


O2 Sat by Pulse Oximetry (%)  95   12/27/18 21:00











Constitutional: Yes: No Distress


Cardiovascular: Yes: Regular Rate and Rhythm (paced)


Respiratory: Yes: CTA Bilaterally (no rales or wheezing)


Gastrointestinal: Yes: Soft


Edema: No


Neurological: Yes: Alert


Labs: 


 CBC, BMP





 12/27/18 07:00 





 12/27/18 06:00 





 INR, PTT











INR  2.09  (0.83-1.09)  H  12/25/18  10:50    








 Laboratory Tests











  12/25/18 12/26/18 12/27/18





  23:27 14:15 06:00


 


WBC   


 


Hgb   


 


Plt Count   


 


Sodium    138


 


Potassium    4.8


 


BUN    17


 


Creatinine    0.8


 


Creatine Kinase  31  38 


 


Troponin I  < 0.02  < 0.02 














  12/27/18





  07:00


 


WBC  8.9


 


Hgb  9.9 L


 


Plt Count  222  D


 


Sodium 


 


Potassium 


 


BUN 


 


Creatinine 


 


Creatine Kinase 


 


Troponin I 














- ....Imaging


EKG: Image Reviewed





Problem List





- Problems


(1) Atypical chest pain


Code(s): R07.89 - OTHER CHEST PAIN   





(2) Abnormal ECG


Code(s): R94.31 - ABNORMAL ELECTROCARDIOGRAM [ECG] [EKG]   





(3) Pacemaker


Code(s): Z95.0 - PRESENCE OF CARDIAC PACEMAKER   





(4) Atrial fibrillation


Code(s): I48.91 - UNSPECIFIED ATRIAL FIBRILLATION   


Qualifiers: 


   Atrial fibrillation type: paroxysmal   Qualified Code(s): I48.0 - Paroxysmal 

atrial fibrillation   





(5) Coronary artery disease


Code(s): I25.10 - ATHSCL HEART DISEASE OF NATIVE CORONARY ARTERY W/O ANG PCTRS 

  


Qualifiers: 


   Coronary Disease-Associated Artery/Lesion type: native artery 





Assessment/Plan





IMP:


Atypical CP


Nonspecific T wave changes- possibly "T wave memory" repol changes from 

intermittent pacing


Non-obstx CAD


PAF on AC (NOAC) 








REC:


Stress MPI with mild anterior ischemia.


Above d/w patient and family at length.


Recommended [facility] for definitive assessment of coronary anatomy.


Risks, benefits of procedure discussed in detail. Risks of undx CAD including MI

, death explained.


Patient has refused cath and prefers to continue with medical Rx. 


Will add Isordil.


Will need close f/u





D/w PMD